# Patient Record
Sex: FEMALE | Race: WHITE | URBAN - METROPOLITAN AREA
[De-identification: names, ages, dates, MRNs, and addresses within clinical notes are randomized per-mention and may not be internally consistent; named-entity substitution may affect disease eponyms.]

---

## 2020-12-08 ENCOUNTER — OFFICE VISIT (OUTPATIENT)
Dept: OBGYN CLINIC | Age: 25
End: 2020-12-08
Payer: COMMERCIAL

## 2020-12-08 VITALS
WEIGHT: 213.6 LBS | BODY MASS INDEX: 34.33 KG/M2 | SYSTOLIC BLOOD PRESSURE: 120 MMHG | HEIGHT: 66 IN | DIASTOLIC BLOOD PRESSURE: 80 MMHG

## 2020-12-08 DIAGNOSIS — Z11.51 SCREENING FOR HPV (HUMAN PAPILLOMAVIRUS): ICD-10-CM

## 2020-12-08 DIAGNOSIS — Z01.419 VISIT FOR GYNECOLOGIC EXAMINATION: ICD-10-CM

## 2020-12-08 DIAGNOSIS — Z11.3 ROUTINE SCREENING FOR STI (SEXUALLY TRANSMITTED INFECTION): ICD-10-CM

## 2020-12-08 LAB
HCG, URINE, POC: NEGATIVE
Lab: NORMAL
NEGATIVE QC PASS/FAIL: NORMAL
POSITIVE QC PASS/FAIL: NORMAL

## 2020-12-08 PROCEDURE — G8484 FLU IMMUNIZE NO ADMIN: HCPCS | Performed by: OBSTETRICS & GYNECOLOGY

## 2020-12-08 PROCEDURE — 99395 PREV VISIT EST AGE 18-39: CPT | Performed by: OBSTETRICS & GYNECOLOGY

## 2020-12-08 PROCEDURE — G8427 DOCREV CUR MEDS BY ELIG CLIN: HCPCS | Performed by: OBSTETRICS & GYNECOLOGY

## 2020-12-08 PROCEDURE — 96372 THER/PROPH/DIAG INJ SC/IM: CPT | Performed by: OBSTETRICS & GYNECOLOGY

## 2020-12-08 PROCEDURE — 81025 URINE PREGNANCY TEST: CPT | Performed by: OBSTETRICS & GYNECOLOGY

## 2020-12-08 PROCEDURE — G8417 CALC BMI ABV UP PARAM F/U: HCPCS | Performed by: OBSTETRICS & GYNECOLOGY

## 2020-12-08 PROCEDURE — 1036F TOBACCO NON-USER: CPT | Performed by: OBSTETRICS & GYNECOLOGY

## 2020-12-08 RX ORDER — MEDROXYPROGESTERONE ACETATE 150 MG/ML
150 INJECTION, SUSPENSION INTRAMUSCULAR ONCE
Status: COMPLETED | OUTPATIENT
Start: 2020-12-08 | End: 2020-12-08

## 2020-12-08 RX ADMIN — MEDROXYPROGESTERONE ACETATE 150 MG: 150 INJECTION, SUSPENSION INTRAMUSCULAR at 15:08

## 2020-12-08 SDOH — HEALTH STABILITY: MENTAL HEALTH: HOW OFTEN DO YOU HAVE A DRINK CONTAINING ALCOHOL?: MONTHLY OR LESS

## 2020-12-08 ASSESSMENT — PATIENT HEALTH QUESTIONNAIRE - PHQ9
SUM OF ALL RESPONSES TO PHQ QUESTIONS 1-9: 0
SUM OF ALL RESPONSES TO PHQ QUESTIONS 1-9: 0
SUM OF ALL RESPONSES TO PHQ9 QUESTIONS 1 & 2: 0
2. FEELING DOWN, DEPRESSED OR HOPELESS: 0
1. LITTLE INTEREST OR PLEASURE IN DOING THINGS: 0
SUM OF ALL RESPONSES TO PHQ QUESTIONS 1-9: 0

## 2020-12-09 LAB
CLUE CELLS: NORMAL
TRICHOMONAS PREP: NORMAL
TRICHOMONAS VAGINALIS SCREEN: NEGATIVE
YEAST WET PREP: NORMAL

## 2020-12-09 ASSESSMENT — ENCOUNTER SYMPTOMS
BLOOD IN STOOL: 0
WHEEZING: 0
COUGH: 0
SHORTNESS OF BREATH: 0
ABDOMINAL PAIN: 0
DIARRHEA: 0
ABDOMINAL DISTENTION: 0
SORE THROAT: 0
CONSTIPATION: 0
NAUSEA: 0
VOMITING: 0

## 2020-12-09 NOTE — PROGRESS NOTES
Physically abused: Not on file     Forced sexual activity: Not on file   Other Topics Concern    Not on file   Social History Narrative    Not on file       GynecologicHistory  No LMP recorded. Patient has had an injection. Last Pap: 2019 Results: normal  Last Mammogram: Not Indicated Results: N/A  no fmhx cancer  OB History        1    Para   1    Term   1            AB        Living   1       SAB        TAB        Ectopic        Molar        Multiple        Live Births   1              Patient's medications, allergies, past medical, surgical, social and family histories were reviewed and updated as appropriate. Review of Systems  Review of Systems   Constitutional: Negative for activity change, appetite change, fatigue and unexpected weight change. HENT: Negative for nosebleeds and sore throat. Eyes: Negative for visual disturbance. Respiratory: Negative for cough, shortness of breath and wheezing. Cardiovascular: Negative for chest pain, palpitations and leg swelling. Gastrointestinal: Negative for abdominal distention, abdominal pain, blood in stool, constipation, diarrhea, nausea and vomiting. Endocrine: Negative for cold intolerance, heat intolerance, polydipsia and polyuria. Genitourinary: Negative for difficulty urinating, dyspareunia, dysuria, frequency, genital sores, hematuria, menstrual problem, pelvic pain, urgency, vaginal bleeding, vaginal discharge and vaginal pain. Musculoskeletal: Negative for arthralgias. Skin: Negative for rash. Neurological: Negative for dizziness, weakness, light-headedness and headaches. Hematological: Negative for adenopathy. Does not bruise/bleed easily. Psychiatric/Behavioral: Negative for agitation, confusion, dysphoric mood and sleep disturbance.          Objective:     Vitals:  /80   Ht 5' 6\" (1.676 m)   Wt 213 lb 9.6 oz (96.9 kg)   BMI 34.48 kg/m²     Physical Exam  Constitutional:       General: She is not in acute distress. Appearance: She is well-developed. She is not diaphoretic. HENT:      Head: Normocephalic. Eyes:      Conjunctiva/sclera: Conjunctivae normal.      Pupils: Pupils are equal, round, and reactive to light. Neck:      Thyroid: No thyromegaly. Trachea: No tracheal deviation. Cardiovascular:      Rate and Rhythm: Normal rate and regular rhythm. Heart sounds: Normal heart sounds. No murmur. No friction rub. No gallop. Pulmonary:      Effort: Pulmonary effort is normal. No respiratory distress. Breath sounds: Normal breath sounds. No wheezing or rales. Chest:      Chest wall: No tenderness. Breasts:         Right: No inverted nipple, mass, nipple discharge, skin change or tenderness. Left: No inverted nipple, mass, nipple discharge, skin change or tenderness. Abdominal:      General: Bowel sounds are normal. There is no distension. Palpations: Abdomen is soft. There is no mass. Tenderness: There is no abdominal tenderness. There is no guarding or rebound. Genitourinary:     Labia:         Right: No rash, tenderness or lesion. Left: No rash, tenderness or lesion. Vagina: Normal. No vaginal discharge, erythema, tenderness or bleeding. Cervix: No cervical motion tenderness, discharge or friability. Uterus: Not deviated, not enlarged, not fixed and not tender. Adnexa:         Right: No mass, tenderness or fullness. Left: No mass, tenderness or fullness. Comments: Uterus is not prolapsed and there is not a cystocele or rectocele noted  Musculoskeletal:      Right lower leg: No edema. Left lower leg: No edema. Lymphadenopathy:      Upper Body:      Right upper body: No supraclavicular or axillary adenopathy. Left upper body: No supraclavicular or axillary adenopathy. Skin:     General: Skin is warm and dry. Neurological:      Mental Status: She is alert and oriented to person, place, and time. DO

## 2020-12-11 LAB
C TRACH DNA GENITAL QL NAA+PROBE: NEGATIVE
N. GONORRHOEAE DNA: NEGATIVE

## 2021-03-08 ENCOUNTER — NURSE ONLY (OUTPATIENT)
Dept: OBGYN CLINIC | Age: 26
End: 2021-03-08
Payer: COMMERCIAL

## 2021-03-08 DIAGNOSIS — N94.6 MENORRHALGIA: Primary | ICD-10-CM

## 2021-03-08 LAB
HCG, URINE, POC: NEGATIVE
Lab: NORMAL
NEGATIVE QC PASS/FAIL: NORMAL
POSITIVE QC PASS/FAIL: NORMAL

## 2021-03-08 PROCEDURE — 81025 URINE PREGNANCY TEST: CPT | Performed by: ADVANCED PRACTICE MIDWIFE

## 2021-03-08 PROCEDURE — 96372 THER/PROPH/DIAG INJ SC/IM: CPT | Performed by: ADVANCED PRACTICE MIDWIFE

## 2021-03-08 RX ORDER — MEDROXYPROGESTERONE ACETATE 150 MG/ML
150 INJECTION, SUSPENSION INTRAMUSCULAR ONCE
Status: COMPLETED | OUTPATIENT
Start: 2021-03-08 | End: 2021-03-08

## 2021-03-08 RX ADMIN — MEDROXYPROGESTERONE ACETATE 150 MG: 150 INJECTION, SUSPENSION INTRAMUSCULAR at 14:20

## 2021-03-08 NOTE — PROGRESS NOTES
After obtaining consent, and per orders of Dr. Lakisha Mayo CNM, injection of depo given in Right deltoid by Birdie Osler. Patient instructed to remain in clinic for 20 minutes afterwards, and to report any adverse reaction to me immediately.

## 2021-03-15 ENCOUNTER — OFFICE VISIT (OUTPATIENT)
Dept: FAMILY MEDICINE CLINIC | Age: 26
End: 2021-03-15
Payer: COMMERCIAL

## 2021-03-15 VITALS
BODY MASS INDEX: 33.62 KG/M2 | DIASTOLIC BLOOD PRESSURE: 70 MMHG | TEMPERATURE: 98 F | HEART RATE: 90 BPM | OXYGEN SATURATION: 99 % | HEIGHT: 67 IN | SYSTOLIC BLOOD PRESSURE: 110 MMHG | WEIGHT: 214.2 LBS

## 2021-03-15 DIAGNOSIS — M54.50 LUMBAR PAIN: Primary | ICD-10-CM

## 2021-03-15 DIAGNOSIS — Z13.31 POSITIVE DEPRESSION SCREENING: ICD-10-CM

## 2021-03-15 DIAGNOSIS — E61.1 IRON DEFICIENCY: ICD-10-CM

## 2021-03-15 DIAGNOSIS — M79.671 RIGHT FOOT PAIN: ICD-10-CM

## 2021-03-15 DIAGNOSIS — H61.20 IMPACTED CERUMEN, UNSPECIFIED LATERALITY: ICD-10-CM

## 2021-03-15 LAB
BASOPHILS ABSOLUTE: 0 K/UL (ref 0–0.2)
BASOPHILS RELATIVE PERCENT: 0.4 %
EOSINOPHILS ABSOLUTE: 0.3 K/UL (ref 0–0.7)
EOSINOPHILS RELATIVE PERCENT: 2.9 %
HCT VFR BLD CALC: 42.9 % (ref 37–47)
HEMOGLOBIN: 14.4 G/DL (ref 12–16)
LYMPHOCYTES ABSOLUTE: 4.4 K/UL (ref 1–4.8)
LYMPHOCYTES RELATIVE PERCENT: 37.4 %
MCH RBC QN AUTO: 29.6 PG (ref 27–31.3)
MCHC RBC AUTO-ENTMCNC: 33.5 % (ref 33–37)
MCV RBC AUTO: 88.3 FL (ref 82–100)
MONOCYTES ABSOLUTE: 0.8 K/UL (ref 0.2–0.8)
MONOCYTES RELATIVE PERCENT: 6.6 %
NEUTROPHILS ABSOLUTE: 6.2 K/UL (ref 1.4–6.5)
NEUTROPHILS RELATIVE PERCENT: 52.7 %
PDW BLD-RTO: 12.7 % (ref 11.5–14.5)
PLATELET # BLD: 267 K/UL (ref 130–400)
RBC # BLD: 4.85 M/UL (ref 4.2–5.4)
WBC # BLD: 11.7 K/UL (ref 4.8–10.8)

## 2021-03-15 PROCEDURE — G8417 CALC BMI ABV UP PARAM F/U: HCPCS | Performed by: FAMILY MEDICINE

## 2021-03-15 PROCEDURE — G8484 FLU IMMUNIZE NO ADMIN: HCPCS | Performed by: FAMILY MEDICINE

## 2021-03-15 PROCEDURE — G8427 DOCREV CUR MEDS BY ELIG CLIN: HCPCS | Performed by: FAMILY MEDICINE

## 2021-03-15 PROCEDURE — G8431 POS CLIN DEPRES SCRN F/U DOC: HCPCS | Performed by: FAMILY MEDICINE

## 2021-03-15 PROCEDURE — 69210 REMOVE IMPACTED EAR WAX UNI: CPT | Performed by: FAMILY MEDICINE

## 2021-03-15 PROCEDURE — 1036F TOBACCO NON-USER: CPT | Performed by: FAMILY MEDICINE

## 2021-03-15 PROCEDURE — 99204 OFFICE O/P NEW MOD 45 MIN: CPT | Performed by: FAMILY MEDICINE

## 2021-03-15 RX ORDER — IBUPROFEN 400 MG/1
400 TABLET ORAL EVERY 6 HOURS PRN
Qty: 120 TABLET | Refills: 3 | Status: SHIPPED | OUTPATIENT
Start: 2021-03-15 | End: 2022-08-19 | Stop reason: SDUPTHER

## 2021-03-15 RX ORDER — MEDROXYPROGESTERONE ACETATE 150 MG/ML
150 INJECTION, SUSPENSION INTRAMUSCULAR
COMMUNITY
End: 2022-03-18 | Stop reason: ALTCHOICE

## 2021-03-15 RX ORDER — CYCLOBENZAPRINE HCL 5 MG
5 TABLET ORAL 2 TIMES DAILY PRN
Qty: 30 TABLET | Refills: 2 | Status: SHIPPED | OUTPATIENT
Start: 2021-03-15 | End: 2022-03-21 | Stop reason: SDUPTHER

## 2021-03-15 SDOH — ECONOMIC STABILITY: FOOD INSECURITY: WITHIN THE PAST 12 MONTHS, THE FOOD YOU BOUGHT JUST DIDN'T LAST AND YOU DIDN'T HAVE MONEY TO GET MORE.: NEVER TRUE

## 2021-03-15 SDOH — ECONOMIC STABILITY: FOOD INSECURITY: WITHIN THE PAST 12 MONTHS, YOU WORRIED THAT YOUR FOOD WOULD RUN OUT BEFORE YOU GOT MONEY TO BUY MORE.: NEVER TRUE

## 2021-03-15 SDOH — ECONOMIC STABILITY: TRANSPORTATION INSECURITY
IN THE PAST 12 MONTHS, HAS LACK OF TRANSPORTATION KEPT YOU FROM MEETINGS, WORK, OR FROM GETTING THINGS NEEDED FOR DAILY LIVING?: NO

## 2021-03-15 ASSESSMENT — COLUMBIA-SUICIDE SEVERITY RATING SCALE - C-SSRS
6. HAVE YOU EVER DONE ANYTHING, STARTED TO DO ANYTHING, OR PREPARED TO DO ANYTHING TO END YOUR LIFE?: NO
2. HAVE YOU ACTUALLY HAD ANY THOUGHTS OF KILLING YOURSELF?: NO

## 2021-03-15 ASSESSMENT — PATIENT HEALTH QUESTIONNAIRE - PHQ9
2. FEELING DOWN, DEPRESSED OR HOPELESS: 2
10. IF YOU CHECKED OFF ANY PROBLEMS, HOW DIFFICULT HAVE THESE PROBLEMS MADE IT FOR YOU TO DO YOUR WORK, TAKE CARE OF THINGS AT HOME, OR GET ALONG WITH OTHER PEOPLE: 2
9. THOUGHTS THAT YOU WOULD BE BETTER OFF DEAD, OR OF HURTING YOURSELF: 1
6. FEELING BAD ABOUT YOURSELF - OR THAT YOU ARE A FAILURE OR HAVE LET YOURSELF OR YOUR FAMILY DOWN: 3
SUM OF ALL RESPONSES TO PHQ QUESTIONS 1-9: 19
SUM OF ALL RESPONSES TO PHQ QUESTIONS 1-9: 19
5. POOR APPETITE OR OVEREATING: 2

## 2021-03-15 NOTE — PROGRESS NOTES
Chief Complaint   Patient presents with   [address]     Has not had a PCP in many years.  Ankle Pain     Has been having right ankle pain for the past month. Denies any injury. Denies any swelling. Has not used heat or ice to the area. Has used OTC pain cream with min. relief.  Back Pain     States she was told when she was pregnant that she has \"sciatica\". States when she made this appt. she was having a lot of pain. This has decreased now, but would like to discuss things she can do for this in the future.  Depression     Has struggled with depression for a long time. States she would like to find a counselor to talk with. Just moved to the area from [address] [place] [address] 153        HPI: Osbaldo Stovall 23 y.o. female presenting for     Depression   Was seeing a counselor in the past (years ago)  But has not seen anyone in 8 years   Would like to come back to counselor. Has not been diagnosed. Back pain   Was in the ED for pain in the leg  Told sciatica   Was given muscle relaxers but did not take it because she was pregnant   Pain is in the lower back with not much radiation. 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 Right ankle pain  Back of right ankle painful for the last couple worse with movement. Has tried topical pain medication to help with it. Does not wear insoles     Current Outpatient Medications   Medication Sig Dispense Refill    medroxyPROGESTERone (DEPO-PROVERA) 150 MG/ML injection Inject 150 mg into the muscle every 3 months      ibuprofen (ADVIL;MOTRIN) 400 MG tablet Take 1 tablet by mouth every 6 hours as needed for Pain 120 tablet 3    cyclobenzaprine (FLEXERIL) 5 MG tablet Take 1 tablet by mouth 2 times daily as needed for Muscle spasms 30 tablet 2     No current facility-administered medications for this visit. ROS  CONSTITUTIONAL: The patient denies fevers, chills, sweats and body ache.   HEENT: Denies headache, blurry vision, eye pain, tinnitus, vertigo,  sore throat, neck or thyroid masses. RESPIRATORY: Denies cough, sputum, hemoptysis. CARDIAC: Denies chest pain, pressure, palpitations, Denies lower extremity edema. GASTROINTESTINAL: Denies abdominal pain, constipation, diarrhea, bleeding in the stools,   GENITOURINARY: Denies dysuria, hematuria, nocturia or frequency, urinary incontinence. NEUROLOGIC: Denies headaches, dizziness, syncope, weakness  MUSCULOSKELETAL:  Admits to ankle and back pain. ENDOCRINOLOGY: Denies heat or cold intolerance. HEMATOLOGY: Denies easy bleeding or blood transfusion,anemia  DERMATOLOGY: Denies changes in moles or pigmentation changes. PSYCHIATRY: Denies depression, agitation or anxiety. Past Medical History:   Diagnosis Date    Class 1 obesity due to excess calories without serious comorbidity with body mass index (BMI) of 34.0 to 34.9 in adult     Current episode of major depressive disorder without prior episode         History reviewed. No pertinent surgical history.      Family History   Problem Relation Age of Onset    No Known Problems Mother     No Known Problems Father         Social History     Socioeconomic History    Marital status: Single     Spouse name: Not on file    Number of children: Not on file    Years of education: Not on file    Highest education level: Not on file   Occupational History    Not on file   Social Needs    Financial resource strain: Not very hard    Food insecurity     Worry: Never true     Inability: Never true    Transportation needs     Medical: No     Non-medical: No   Tobacco Use    Smoking status: Never Smoker    Smokeless tobacco: Never Used   Substance and Sexual Activity    Alcohol use: Yes     Frequency: Monthly or less    Drug use: Never    Sexual activity: Not Currently     Birth control/protection: Injection   Lifestyle    Physical activity     Days per week: Not on file     Minutes per session: Not on file    Stress: Not on file   Relationships    Social connections     Talks on phone: Not on file     Gets together: Not on file     Attends Scientology service: Not on file     Active member of club or organization: Not on file     Attends meetings of clubs or organizations: Not on file     Relationship status: Not on file    Intimate partner violence     Fear of current or ex partner: Not on file     Emotionally abused: Not on file     Physically abused: Not on file     Forced sexual activity: Not on file   Other Topics Concern    Not on file   Social History Narrative    Has 1 child 2017 Girl- Purnima     [place] moved to [facility] stayed there for 8 years     Hobbies: sleep, likes to walk around         /70   Pulse 90   Temp 98 °F (36.7 °C)   Ht 5' 6.5\" (1.689 m)   Wt 214 lb 3.2 oz (97.2 kg)   SpO2 99%   BMI 34.05 kg/m²        Physical Exam:    General appearance - alert, well appearing, and in no distress  Mental Status - alert, oriented to person, place, and time  Eyes - pupils equal and reactive, extraocular eye movements intact   Ears - bilateral TM's and external ear canals normal Cerumen impaction noted.    Nose - normal and patent, no erythema, discharge or polyps   Sinuses - Normal paranasal sinuses without tenderness   Throat - mucous membranes moist, pharynx normal without lesions   Neck - supple, no significant adenopathy   Thyroid - thyroid is normal in size without nodules or tenderness    Chest - clear to auscultation, no wheezes, rales or rhonchi, symmetric air entry   Heart - normal rate, regular rhythm, normal S1, S2, no murmurs, rubs, clicks or gallops  Abdomen - soft, nontender, nondistended, no masses or organomegaly   Back exam - full range of motion, no tenderness, palpable spasm or pain on motion   Neurological - alert, oriented, normal speech, no focal findings or movement disorder noted   Musculoskeletal - tenderness to palpation along the achilles tendendon  Extremities - peripheral pulses normal, no pedal edema, no clubbing or cyanosis   Skin - normal coloration and turgor, no rashes, no suspicious skin lesions noted      Labs   No results found for: TSHREFLEX  No results found for: TSH    No results found for: NA, K, CL, CO2, BUN, CREATININE, GLUCOSE, CALCIUM, PROT, LABALBU, BILITOT, ALKPHOS, AST, ALT, LABGLOM, GFRAA, AGRATIO, GLOB      Lab Results   Component Value Date    WBC 11.7 (H) 03/15/2021    HGB 14.4 03/15/2021    HCT 42.9 03/15/2021    MCV 88.3 03/15/2021     03/15/2021     No results found for: LABA1C  No results found for: EAG      A/P: Osbaldo Márquezin 23 y.o. female presenting for     1. Lumbar pain    - XR LUMBAR SPINE (MIN 4 VIEWS); Future  - ibuprofen (ADVIL;MOTRIN) 400 MG tablet; Take 1 tablet by mouth every 6 hours as needed for Pain  Dispense: 120 tablet; Refill: 3    2. Positive depression screening    - Positive Screen for Clinical Depression with a Documented Follow-up Plan Yeny Catalan, PhD, Psychology, [address] [place] [address]    3. Iron deficiency    - CBC With Auto Differential; Future    4. Right foot pain    - XR FOOT RIGHT (MIN 3 VIEWS); Future  - ibuprofen (ADVIL;MOTRIN) 400 MG tablet; Take 1 tablet by mouth every 6 hours as needed for Pain  Dispense: 120 tablet; Refill: 3    5. Impacted cerumen, unspecified laterality    - 21214 - CT REMOVE IMPACTED EAR WAX          Please note, this report has been partially produced using speech recognition software  and may cause  and /or contain errors related to that system including grammar, punctuation and spelling as well as words and phrases that may seem inappropriate. If there are questions or concerns please feel free to contact me to clarify.

## 2021-03-19 DIAGNOSIS — M54.40 CHRONIC LOW BACK PAIN WITH SCIATICA, SCIATICA LATERALITY UNSPECIFIED, UNSPECIFIED BACK PAIN LATERALITY: Primary | ICD-10-CM

## 2021-03-19 DIAGNOSIS — G89.29 CHRONIC LOW BACK PAIN WITH SCIATICA, SCIATICA LATERALITY UNSPECIFIED, UNSPECIFIED BACK PAIN LATERALITY: Primary | ICD-10-CM

## 2021-03-19 RX ORDER — GABAPENTIN 300 MG/1
300 CAPSULE ORAL 2 TIMES DAILY
Qty: 60 CAPSULE | Refills: 0 | Status: SHIPPED | OUTPATIENT
Start: 2021-03-19 | End: 2022-03-18 | Stop reason: ALTCHOICE

## 2021-12-27 ENCOUNTER — NURSE ONLY (OUTPATIENT)
Dept: FAMILY MEDICINE CLINIC | Age: 26
End: 2021-12-27
Payer: COMMERCIAL

## 2021-12-27 DIAGNOSIS — U07.1 SARS-COV-2 POSITIVE: Primary | ICD-10-CM

## 2021-12-27 LAB
Lab: ABNORMAL
PERFORMING INSTRUMENT: ABNORMAL
QC PASS/FAIL: ABNORMAL
SARS-COV-2, POC: DETECTED

## 2021-12-27 PROCEDURE — 87426 SARSCOV CORONAVIRUS AG IA: CPT | Performed by: NURSE PRACTITIONER

## 2022-03-18 ENCOUNTER — TELEPHONE (OUTPATIENT)
Dept: OBGYN CLINIC | Age: 27
End: 2022-03-18

## 2022-03-18 ENCOUNTER — OFFICE VISIT (OUTPATIENT)
Dept: OBGYN CLINIC | Age: 27
End: 2022-03-18
Payer: COMMERCIAL

## 2022-03-18 VITALS
DIASTOLIC BLOOD PRESSURE: 60 MMHG | SYSTOLIC BLOOD PRESSURE: 100 MMHG | WEIGHT: 228 LBS | BODY MASS INDEX: 36.64 KG/M2 | HEIGHT: 66 IN

## 2022-03-18 DIAGNOSIS — Z11.3 ROUTINE SCREENING FOR STI (SEXUALLY TRANSMITTED INFECTION): ICD-10-CM

## 2022-03-18 DIAGNOSIS — Z01.419 ENCOUNTER FOR ANNUAL ROUTINE GYNECOLOGICAL EXAMINATION: Primary | ICD-10-CM

## 2022-03-18 DIAGNOSIS — Z01.419 ENCOUNTER FOR ANNUAL ROUTINE GYNECOLOGICAL EXAMINATION: ICD-10-CM

## 2022-03-18 PROCEDURE — G8417 CALC BMI ABV UP PARAM F/U: HCPCS | Performed by: OBSTETRICS & GYNECOLOGY

## 2022-03-18 PROCEDURE — 99395 PREV VISIT EST AGE 18-39: CPT | Performed by: OBSTETRICS & GYNECOLOGY

## 2022-03-18 PROCEDURE — G8484 FLU IMMUNIZE NO ADMIN: HCPCS | Performed by: OBSTETRICS & GYNECOLOGY

## 2022-03-18 PROCEDURE — 1036F TOBACCO NON-USER: CPT | Performed by: OBSTETRICS & GYNECOLOGY

## 2022-03-18 PROCEDURE — G8427 DOCREV CUR MEDS BY ELIG CLIN: HCPCS | Performed by: OBSTETRICS & GYNECOLOGY

## 2022-03-18 RX ORDER — NORETHINDRONE ACETATE AND ETHINYL ESTRADIOL 1MG-20(21)
1 KIT ORAL DAILY
Qty: 1 PACKET | Refills: 12 | Status: SHIPPED | OUTPATIENT
Start: 2022-03-18 | End: 2022-04-17 | Stop reason: SDUPTHER

## 2022-03-18 NOTE — TELEPHONE ENCOUNTER
Rx request   Requested Prescriptions     Pending Prescriptions Disp Refills    cyclobenzaprine (FLEXERIL) 5 MG tablet [Pharmacy Med Name: CYCLOBENZAPRINE 5 MG TABLET] 30 tablet 2     Sig: take 1 tablet by mouth twice a day if needed for muscle spasm     LOV 3/15/2021  Next Visit Date:  No future appointments.

## 2022-03-18 NOTE — PROGRESS NOTES
Subjective: Cassy Hudson is a 27 y.o. female  here for routine exam.  Current Complaints: normal menses, no abnormal bleeding, pelvic pain or discharge, no breast pain or new or enlarging lumps on self exam.    Menstrual history:   regular menses 28 days  Sexual activity:  yes, denies knowledge of risky exposure  Abnormalvaginal discharge:  No  Contraceptive method:  none and Depo-Provera injections    Vitals:  /60   Ht 5' 6\" (1.676 m)   Wt 228 lb (103.4 kg)   BMI 36.80 kg/m²   Allergies:Sulfamethoxazole-trimethoprim  Past Medical History:   Diagnosis Date    Class 1 obesity due to excess calories without serious comorbidity with body mass index (BMI) of 34.0 to 34.9 in adult     Current episode of major depressive disorder without prior episode      No past surgical history on file. Family History   Problem Relation Age of Onset    No Known Problems Mother     No Known Problems Father      Social History     Socioeconomic History    Marital status: Single     Spouse name: Not on file    Number of children: Not on file    Years of education: Not on file    Highest education level: Not on file   Occupational History    Not on file   Tobacco Use    Smoking status: Never Smoker    Smokeless tobacco: Never Used   Vaping Use    Vaping Use: Never used   Substance and Sexual Activity    Alcohol use:  Yes    Drug use: Never    Sexual activity: Not Currently     Birth control/protection: Injection   Other Topics Concern    Not on file   Social History Narrative    Has 1 child 2017 Girl- Purnima     Born in [place] moved to [facility] stayed there for 8 years     Hobbies: sleep, likes to walk around      Social Determinants of Health     Financial Resource Strain:     Difficulty of Paying Living Expenses: Not on file   Food Insecurity:     Worried About Running Out of Food in the Last Year: Not on file    Robinson of Food in the Last Year: Not on file   Transportation Needs:     Lack of Transportation (Medical): Not on file    Lack of Transportation (Non-Medical): Not on file   Physical Activity:     Days of Exercise per Week: Not on file    Minutes of Exercise per Session: Not on file   Stress:     Feeling of Stress : Not on file   Social Connections:     Frequency of Communication with Friends and Family: Not on file    Frequency of Social Gatherings with Friends and Family: Not on file    Attends Adventism Services: Not on file    Active Member of [address] [place] [address] [employer] or Organizations: Not on file    Attends Club or Organization Meetings: Not on file    Marital Status: Not on file   Intimate Partner Violence:     Fear of Current or Ex-Partner: Not on file    Emotionally Abused: Not on file    Physically Abused: Not on file    Sexually Abused: Not on file   Housing Stability:     Unable to Pay for Housing in the Last Year: Not on file    Number of [place] in the Last Year: Not on file    Unstable Housing in the Last Year: Not on file       GynecologicHistory  No LMP recorded. Patient has had an injection. Last Pap:  Results: normal  Last Mammogram: Not Indicated Results: N/A  no fmhx cancer  OB History        1    Para   1    Term   1            AB        Living   1       SAB        IAB        Ectopic        Molar        Multiple        Live Births   1              Patient's medications, allergies, past medical, surgical, social and family histories were reviewed and updated as appropriate. Review of Systems  Review of Systems   Constitutional: Negative for activity change, appetite change, fatigue and unexpected weight change. HENT: Negative for nosebleeds and sore throat. Eyes: Negative for visual disturbance. Respiratory: Negative for cough, shortness of breath and wheezing. Cardiovascular: Negative for chest pain, palpitations and leg swelling.    Gastrointestinal: Negative for abdominal distention, abdominal pain, blood in stool, constipation, diarrhea, nausea and vomiting. Endocrine: Negative for cold intolerance, heat intolerance, polydipsia and polyuria. Genitourinary: Negative for difficulty urinating, dyspareunia, dysuria, frequency, genital sores, hematuria, menstrual problem, pelvic pain, urgency, vaginal bleeding, vaginal discharge and vaginal pain. Musculoskeletal: Negative for arthralgias. Skin: Negative for rash. Neurological: Negative for dizziness, weakness, light-headedness and headaches. Hematological: Negative for adenopathy. Does not bruise/bleed easily. Psychiatric/Behavioral: Negative for confusion and sleep disturbance. Objective:     Vitals:  /60   Ht 5' 6\" (1.676 m)   Wt 228 lb (103.4 kg)   BMI 36.80 kg/m²     Physical Exam  Constitutional:       General: She is not in acute distress. Appearance: She is well-developed. She is not diaphoretic. HENT:      Head: Normocephalic. Eyes:      Conjunctiva/sclera: Conjunctivae normal.      Pupils: Pupils are equal, round, and reactive to light. Neck:      Thyroid: No thyromegaly. Trachea: No tracheal deviation. Cardiovascular:      Rate and Rhythm: Normal rate and regular rhythm. Heart sounds: Normal heart sounds. No murmur heard. No friction rub. No gallop. Pulmonary:      Effort: Pulmonary effort is normal. No respiratory distress. Breath sounds: Normal breath sounds. No wheezing or rales. Chest:      Chest wall: No tenderness. Abdominal:      General: Bowel sounds are normal. There is no distension. Palpations: Abdomen is soft. There is no mass. Tenderness: There is no abdominal tenderness. There is no guarding or rebound. Genitourinary:     Labia:         Right: No rash, tenderness or lesion. Left: No rash, tenderness or lesion. Vagina: Normal. No vaginal discharge, erythema, tenderness or bleeding. Cervix: No cervical motion tenderness, discharge or friability.       Uterus: Not deviated, not enlarged, not fixed and not tender. Adnexa:         Right: No mass, tenderness or fullness. Left: No mass, tenderness or fullness. Comments: Uterus is not prolapsed and there is not a cystocele or rectocele noted  Skin:     General: Skin is warm and dry. Neurological:      Mental Status: She is alert and oriented to person, place, and time. Cranial Nerves: No cranial nerve deficit. Deep Tendon Reflexes: Reflexes normal.   Psychiatric:         Behavior: Behavior normal.         Judgment: Judgment normal.         Assessment:      Diagnosis Orders   1. Encounter for annual routine gynecological examination  Wet prep, genital    C.trachomatis N.gonorrhoeae DNA    PAP SMEAR   2. Routine screening for STI (sexually transmitted infection)  Wet prep, genital    C.trachomatis N.gonorrhoeae DNA    PAP SMEAR       Body mass index is 36.8 kg/m². Obesity:  Overweight        Plan:   Pap smear : indicated:  performed. Breast exam :Normal  STD work up : As appropriate    Obesity Counseling:  Given  Smoking Counseling:  N/A  STD counseling: Will call pt with results    Orders Placed This Encounter   Procedures    Wet prep, genital     Standing Status:   Future     Number of Occurrences:   1     Standing Expiration Date:   3/12/2023    C.trachomatis N.gonorrhoeae DNA     Standing Status:   Future     Number of Occurrences:   1     Standing Expiration Date:   3/12/2023    PAP SMEAR     Standing Status:   Future     Number of Occurrences:   1     Standing Expiration Date:   3/18/2023     Order Specific Question:   Collection Type     Answer: Thin Prep     Order Specific Question:   Prior Abnormal Pap Test     Answer:   No     Order Specific Question:   Screening or Diagnostic     Answer:   Screening     Order Specific Question:   HPV Requested?      Answer:   Yes -  If ASCUS Reflex HPV     Order Specific Question:   High Risk Patient     Answer:   N/A     Orders Placed This Encounter

## 2022-03-18 NOTE — TELEPHONE ENCOUNTER
Pt forgot to mention at appointment that she wants to be put on a birth control pill, please send prescription to [facility] [address] in [place]

## 2022-03-19 ASSESSMENT — ENCOUNTER SYMPTOMS
WHEEZING: 0
ABDOMINAL PAIN: 0
CONSTIPATION: 0
SHORTNESS OF BREATH: 0
ABDOMINAL DISTENTION: 0
BLOOD IN STOOL: 0
COUGH: 0
SORE THROAT: 0
VOMITING: 0
DIARRHEA: 0
NAUSEA: 0

## 2022-03-21 RX ORDER — CYCLOBENZAPRINE HCL 5 MG
TABLET ORAL
Qty: 30 TABLET | Refills: 2 | OUTPATIENT
Start: 2022-03-21

## 2022-03-21 RX ORDER — CYCLOBENZAPRINE HCL 5 MG
5 TABLET ORAL 2 TIMES DAILY PRN
Qty: 30 TABLET | Refills: 1 | Status: SHIPPED | OUTPATIENT
Start: 2022-03-21 | End: 2022-04-05

## 2022-03-24 LAB
C TRACH DNA GENITAL QL NAA+PROBE: POSITIVE
N. GONORRHOEAE DNA: NEGATIVE

## 2022-03-25 DIAGNOSIS — A74.9 CHLAMYDIA: Primary | ICD-10-CM

## 2022-03-25 RX ORDER — AZITHROMYCIN 250 MG/1
250 TABLET, FILM COATED ORAL SEE ADMIN INSTRUCTIONS
Qty: 6 TABLET | Refills: 0 | Status: SHIPPED | OUTPATIENT
Start: 2022-03-25 | End: 2022-03-30

## 2022-04-19 RX ORDER — NORETHINDRONE ACETATE AND ETHINYL ESTRADIOL 1MG-20(21)
1 KIT ORAL DAILY
Qty: 1 PACKET | Refills: 10 | Status: SHIPPED | OUTPATIENT
Start: 2022-04-19 | End: 2022-08-19

## 2022-05-13 ENCOUNTER — OFFICE VISIT (OUTPATIENT)
Dept: OBGYN CLINIC | Age: 27
End: 2022-05-13
Payer: COMMERCIAL

## 2022-05-13 VITALS
HEIGHT: 66 IN | SYSTOLIC BLOOD PRESSURE: 120 MMHG | WEIGHT: 228 LBS | DIASTOLIC BLOOD PRESSURE: 72 MMHG | BODY MASS INDEX: 36.64 KG/M2

## 2022-05-13 DIAGNOSIS — A74.9 CHLAMYDIA: Primary | ICD-10-CM

## 2022-05-13 DIAGNOSIS — A74.9 CHLAMYDIA: ICD-10-CM

## 2022-05-13 PROCEDURE — 1036F TOBACCO NON-USER: CPT | Performed by: OBSTETRICS & GYNECOLOGY

## 2022-05-13 PROCEDURE — G8427 DOCREV CUR MEDS BY ELIG CLIN: HCPCS | Performed by: OBSTETRICS & GYNECOLOGY

## 2022-05-13 PROCEDURE — 99213 OFFICE O/P EST LOW 20 MIN: CPT | Performed by: OBSTETRICS & GYNECOLOGY

## 2022-05-13 PROCEDURE — G8417 CALC BMI ABV UP PARAM F/U: HCPCS | Performed by: OBSTETRICS & GYNECOLOGY

## 2022-05-15 ASSESSMENT — ENCOUNTER SYMPTOMS
APNEA: 0
ABDOMINAL PAIN: 0
SHORTNESS OF BREATH: 0

## 2022-05-15 NOTE — PROGRESS NOTES
Subjective:      Patient ID:  Jose Barton is a 27 y.o. female with chief complaint of:  Chief Complaint   Patient presents with    Follow-up     MARIANA       Patient is status post a recent chlamydia positive test she is status post medication which she tolerated well      Past Medical History:   Diagnosis Date    Class 1 obesity due to excess calories without serious comorbidity with body mass index (BMI) of 34.0 to 34.9 in adult     Current episode of major depressive disorder without prior episode      No past surgical history on file. Family History   Problem Relation Age of Onset    No Known Problems Mother     No Known Problems Father      Current Outpatient Medications on File Prior to Visit   Medication Sig Dispense Refill    norethindrone-ethinyl estradiol ([address] [place] [address] FE 1/20) 1-20 MG-MCG per tablet Take 1 tablet by mouth daily for 28 days 1 packet 10    ibuprofen (ADVIL;MOTRIN) 400 MG tablet Take 1 tablet by mouth every 6 hours as needed for Pain 120 tablet 3     No current facility-administered medications on file prior to visit. Allergies:  Sulfamethoxazole-trimethoprim    Review of Systems   Constitutional: Negative for fatigue and fever. Respiratory: Negative for apnea and shortness of breath. Cardiovascular: Negative for chest pain and palpitations. Gastrointestinal: Negative for abdominal pain. Genitourinary: Negative for difficulty urinating, dysuria, pelvic pain, vaginal bleeding and vaginal discharge. Neurological: Negative for dizziness, weakness and light-headedness. Psychiatric/Behavioral: Negative for agitation and dysphoric mood. Objective:   /72   Ht 5' 6\" (1.676 m)   Wt 228 lb (103.4 kg)   BMI 36.80 kg/m²      Physical Exam  Constitutional:       Appearance: She is well-developed. Eyes:      Pupils: Pupils are equal, round, and reactive to light. Cardiovascular:      Rate and Rhythm: Normal rate and regular rhythm.       Heart sounds: Normal heart sounds. Pulmonary:      Effort: Pulmonary effort is normal.   Abdominal:      General: Bowel sounds are normal.      Palpations: Abdomen is soft. Genitourinary:     Vagina: No vaginal discharge, erythema, tenderness or bleeding. Cervix: No discharge, friability or erythema. Uterus: Not tender. Neurological:      Mental Status: She is alert and oriented to person, place, and time. Assessment:       Diagnosis Orders   1. Chlamydia  C.trachomatis N.gonorrhoeae DNA         Plan:      Orders Placed This Encounter   Procedures    C.trachomatis N.gonorrhoeae DNA     Standing Status:   Future     Number of Occurrences:   1     Standing Expiration Date:   5/13/2023     No orders of the defined types were placed in this encounter. Return if symptoms worsen or fail to improve.      Ermias Sharp, DO

## 2022-05-18 LAB
C TRACH DNA GENITAL QL NAA+PROBE: NEGATIVE
N. GONORRHOEAE DNA: NEGATIVE

## 2022-08-19 ENCOUNTER — OFFICE VISIT (OUTPATIENT)
Dept: FAMILY MEDICINE CLINIC | Age: 27
End: 2022-08-19
Payer: COMMERCIAL

## 2022-08-19 VITALS
BODY MASS INDEX: 35.36 KG/M2 | SYSTOLIC BLOOD PRESSURE: 118 MMHG | HEIGHT: 66 IN | HEART RATE: 88 BPM | OXYGEN SATURATION: 97 % | DIASTOLIC BLOOD PRESSURE: 70 MMHG | TEMPERATURE: 96.7 F | WEIGHT: 220 LBS

## 2022-08-19 DIAGNOSIS — Z11.4 ENCOUNTER FOR SCREENING FOR HIV: ICD-10-CM

## 2022-08-19 DIAGNOSIS — Z11.59 NEED FOR HEPATITIS C SCREENING TEST: ICD-10-CM

## 2022-08-19 DIAGNOSIS — F32.A DEPRESSION, UNSPECIFIED DEPRESSION TYPE: ICD-10-CM

## 2022-08-19 DIAGNOSIS — F32.A DEPRESSION, UNSPECIFIED DEPRESSION TYPE: Primary | ICD-10-CM

## 2022-08-19 DIAGNOSIS — M79.671 RIGHT FOOT PAIN: ICD-10-CM

## 2022-08-19 DIAGNOSIS — L30.9 DERMATITIS: ICD-10-CM

## 2022-08-19 DIAGNOSIS — M54.50 LUMBAR PAIN: ICD-10-CM

## 2022-08-19 LAB
ALBUMIN SERPL-MCNC: 4.4 G/DL (ref 3.5–4.6)
ALP BLD-CCNC: 69 U/L (ref 40–130)
ALT SERPL-CCNC: 19 U/L (ref 0–33)
ANION GAP SERPL CALCULATED.3IONS-SCNC: 12 MEQ/L (ref 9–15)
AST SERPL-CCNC: 14 U/L (ref 0–35)
BILIRUB SERPL-MCNC: 0.3 MG/DL (ref 0.2–0.7)
BUN BLDV-MCNC: 8 MG/DL (ref 6–20)
CALCIUM SERPL-MCNC: 9.1 MG/DL (ref 8.5–9.9)
CHLORIDE BLD-SCNC: 107 MEQ/L (ref 95–107)
CO2: 21 MEQ/L (ref 20–31)
CREAT SERPL-MCNC: 0.54 MG/DL (ref 0.5–0.9)
GFR AFRICAN AMERICAN: >60
GFR NON-AFRICAN AMERICAN: >60
GLOBULIN: 2.3 G/DL (ref 2.3–3.5)
GLUCOSE BLD-MCNC: 80 MG/DL (ref 70–99)
POTASSIUM SERPL-SCNC: 4.1 MEQ/L (ref 3.4–4.9)
SODIUM BLD-SCNC: 140 MEQ/L (ref 135–144)
TOTAL PROTEIN: 6.7 G/DL (ref 6.3–8)

## 2022-08-19 PROCEDURE — 99213 OFFICE O/P EST LOW 20 MIN: CPT | Performed by: FAMILY MEDICINE

## 2022-08-19 PROCEDURE — G8417 CALC BMI ABV UP PARAM F/U: HCPCS | Performed by: FAMILY MEDICINE

## 2022-08-19 PROCEDURE — G8427 DOCREV CUR MEDS BY ELIG CLIN: HCPCS | Performed by: FAMILY MEDICINE

## 2022-08-19 PROCEDURE — 1036F TOBACCO NON-USER: CPT | Performed by: FAMILY MEDICINE

## 2022-08-19 RX ORDER — IBUPROFEN 400 MG/1
400 TABLET ORAL EVERY 6 HOURS PRN
Qty: 120 TABLET | Refills: 3 | Status: SHIPPED | OUTPATIENT
Start: 2022-08-19

## 2022-08-19 SDOH — ECONOMIC STABILITY: FOOD INSECURITY: WITHIN THE PAST 12 MONTHS, YOU WORRIED THAT YOUR FOOD WOULD RUN OUT BEFORE YOU GOT MONEY TO BUY MORE.: NEVER TRUE

## 2022-08-19 SDOH — ECONOMIC STABILITY: TRANSPORTATION INSECURITY
IN THE PAST 12 MONTHS, HAS THE LACK OF TRANSPORTATION KEPT YOU FROM MEDICAL APPOINTMENTS OR FROM GETTING MEDICATIONS?: NO

## 2022-08-19 SDOH — ECONOMIC STABILITY: FOOD INSECURITY: WITHIN THE PAST 12 MONTHS, THE FOOD YOU BOUGHT JUST DIDN'T LAST AND YOU DIDN'T HAVE MONEY TO GET MORE.: NEVER TRUE

## 2022-08-19 ASSESSMENT — PATIENT HEALTH QUESTIONNAIRE - PHQ9
1. LITTLE INTEREST OR PLEASURE IN DOING THINGS: 0
10. IF YOU CHECKED OFF ANY PROBLEMS, HOW DIFFICULT HAVE THESE PROBLEMS MADE IT FOR YOU TO DO YOUR WORK, TAKE CARE OF THINGS AT HOME, OR GET ALONG WITH OTHER PEOPLE: 0
4. FEELING TIRED OR HAVING LITTLE ENERGY: 0
SUM OF ALL RESPONSES TO PHQ QUESTIONS 1-9: 0
SUM OF ALL RESPONSES TO PHQ9 QUESTIONS 1 & 2: 0
SUM OF ALL RESPONSES TO PHQ QUESTIONS 1-9: 0
6. FEELING BAD ABOUT YOURSELF - OR THAT YOU ARE A FAILURE OR HAVE LET YOURSELF OR YOUR FAMILY DOWN: 0
3. TROUBLE FALLING OR STAYING ASLEEP: 0
8. MOVING OR SPEAKING SO SLOWLY THAT OTHER PEOPLE COULD HAVE NOTICED. OR THE OPPOSITE, BEING SO FIGETY OR RESTLESS THAT YOU HAVE BEEN MOVING AROUND A LOT MORE THAN USUAL: 0
2. FEELING DOWN, DEPRESSED OR HOPELESS: 0
5. POOR APPETITE OR OVEREATING: 0
7. TROUBLE CONCENTRATING ON THINGS, SUCH AS READING THE NEWSPAPER OR WATCHING TELEVISION: 0
9. THOUGHTS THAT YOU WOULD BE BETTER OFF DEAD, OR OF HURTING YOURSELF: 0

## 2022-08-19 ASSESSMENT — SOCIAL DETERMINANTS OF HEALTH (SDOH): HOW HARD IS IT FOR YOU TO PAY FOR THE VERY BASICS LIKE FOOD, HOUSING, MEDICAL CARE, AND HEATING?: NOT HARD AT ALL

## 2022-08-19 NOTE — PROGRESS NOTES
Chief Complaint   Patient presents with    Follow-up    Skin Problem     Skin on hands are really dry and they won't heal since the winter        HPI: Odelia Lesches 27 y.o. female presenting for       Dermtaittis   On the hands   Started from the winter   Has tried topical lotions with mild relief of symptoms       Depression   Was seeing a counselor in the past (years ago)  But has not seen anyone in 8 years   Would like to come back to counselor. Has not been diagnosed. Back pain   Was in the ED for pain in the leg  Told sciatica   Was given muscle relaxers but did not take it because she was pregnant   Pain is in the lower back with not much radiation. 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 Right ankle pain  Back of right ankle painful for the last couple worse with movement. Has tried topical pain medication to help with it. Does not wear insoles     Current Outpatient Medications   Medication Sig Dispense Refill    norethindrone-ethinyl estradiol (MICROGESTIN FE 1/20) 1-20 MG-MCG per tablet Take 1 tablet by mouth daily for 28 days 1 packet 10    ibuprofen (ADVIL;MOTRIN) 400 MG tablet Take 1 tablet by mouth every 6 hours as needed for Pain 120 tablet 3     No current facility-administered medications for this visit. ROS  CONSTITUTIONAL: The patient denies fevers, chills, sweats and body ache. HEENT: Denies headache, blurry vision, eye pain, tinnitus, vertigo,  sore throat, neck or thyroid masses. RESPIRATORY: Denies cough, sputum, hemoptysis. CARDIAC: Denies chest pain, pressure, palpitations, Denies lower extremity edema. GASTROINTESTINAL: Denies abdominal pain, constipation, diarrhea, bleeding in the stools,   GENITOURINARY: Denies dysuria, hematuria, nocturia or frequency, urinary incontinence. NEUROLOGIC: Denies headaches, dizziness, syncope, weakness  MUSCULOSKELETAL:  Admits to ankle and back pain.    ENDOCRINOLOGY: Denies heat or cold intolerance. HEMATOLOGY: Denies easy bleeding or blood transfusion,anemia  DERMATOLOGY: Has a rash on her hands. PSYCHIATRY: Denies depression, agitation or anxiety. Past Medical History:   Diagnosis Date    Class 1 obesity due to excess calories without serious comorbidity with body mass index (BMI) of 34.0 to 34.9 in adult     Current episode of major depressive disorder without prior episode         No past surgical history on file. Family History   Problem Relation Age of Onset    No Known Problems Mother     No Known Problems Father         Social History     Socioeconomic History    Marital status: Single     Spouse name: Not on file    Number of children: Not on file    Years of education: Not on file    Highest education level: Not on file   Occupational History    Not on file   Tobacco Use    Smoking status: Never    Smokeless tobacco: Never   Vaping Use    Vaping Use: Never used   Substance and Sexual Activity    Alcohol use: Yes    Drug use: Never    Sexual activity: Not Currently     Birth control/protection: Injection   Other Topics Concern    Not on file   Social History Narrative    Has 1 child 2017 Liya- Purnima     [place] moved to [facility] stayed there for 8 years     Hobbies: sleep, likes to walk around      Social Determinants of Health     Financial Resource Strain: Low Risk     Difficulty of Paying Living Expenses: Not hard at all   Food Insecurity: No Food Insecurity    Worried About Running Out of Food in the Last Year: Never true    [address] in the Last Year: Never true   Transportation Needs: No Transportation Needs    Lack of Transportation (Medical): No    Lack of Transportation (Non-Medical):  No   Physical Activity: Not on file   Stress: Not on file   Social Connections: Not on file   Intimate Partner Violence: Not on file   Housing Stability: Not on file        /70   Pulse 88   Temp (!) 96.7 °F (35.9 °C) (Temporal)   Ht 5' 6\" (1.676 m) Wt 220 lb (99.8 kg)   SpO2 97%   BMI 35.51 kg/m²        Physical Exam:    General appearance - alert, well appearing, and in no distress  Mental Status - alert, oriented to person, place, and time  Eyes - pupils equal and reactive, extraocular eye movements intact   Ears - bilateral TM's and external ear canals normal Cerumen impaction noted. Nose - normal and patent, no erythema, discharge or polyps   Sinuses - Normal paranasal sinuses without tenderness   Throat - mucous membranes moist, pharynx normal without lesions   Neck - supple, no significant adenopathy   Thyroid - thyroid is normal in size without nodules or tenderness    Chest - clear to auscultation, no wheezes, rales or rhonchi, symmetric air entry   Heart - normal rate, regular rhythm, normal S1, S2, no murmurs, rubs, clicks or gallops  Abdomen - soft, nontender, nondistended, no masses or organomegaly   Back exam - full range of motion, no tenderness, palpable spasm or pain on motion   Neurological - alert, oriented, normal speech, no focal findings or movement disorder noted   Musculoskeletal - tenderness to palpation along the achilles tendendon  Extremities -scaly rough rash on her hands. No signs of discharge. Skin - normal coloration and turgor, no rashes, no suspicious skin lesions noted      Labs   No results found for: TSHREFLEX  No results found for: TSH    No results found for: NA, K, CL, CO2, BUN, CREATININE, GLUCOSE, CALCIUM, PROT, LABALBU, BILITOT, ALKPHOS, AST, ALT, LABGLOM, GFRAA, AGRATIO, GLOB      Lab Results   Component Value Date    WBC 11.7 (H) 03/15/2021    HGB 14.4 03/15/2021    HCT 42.9 03/15/2021    MCV 88.3 03/15/2021     03/15/2021     No results found for: LABA1C  No results found for: EAG      A/P: Temo Saab 27 y.o. female presenting for     1. Lumbar pain    - ibuprofen (ADVIL;MOTRIN) 400 MG tablet; Take 1 tablet by mouth every 6 hours as needed for Pain  Dispense: 120 tablet; Refill: 3    2.  Right foot pain    - ibuprofen (ADVIL;MOTRIN) 400 MG tablet; Take 1 tablet by mouth every 6 hours as needed for Pain  Dispense: 120 tablet; Refill: 3    3. Depression, unspecified depression type  Would like to see counseling. Denies any SI or HI   - Brant Davison, PhD, Psychology, [address] [place] [address] Panel; Future    4. Dermatitis    - Amb External Referral To Dermatology    5. Need for hepatitis C screening test    - Hepatitis C Antibody; Future    6. Encounter for screening for HIV    - HIV Screen; Future          Please note, this report has been partially produced using speech recognition software  and may cause  and /or contain errors related to that system including grammar, punctuation and spelling as well as words and phrases that may seem inappropriate. If there are questions or concerns please feel free to contact me to clarify.

## 2022-08-20 LAB
HEPATITIS C ANTIBODY: NONREACTIVE
HIV AG/AB: NONREACTIVE

## 2022-08-30 ENCOUNTER — OFFICE VISIT (OUTPATIENT)
Dept: BEHAVIORAL/MENTAL HEALTH CLINIC | Age: 27
End: 2022-08-30
Payer: COMMERCIAL

## 2022-08-30 DIAGNOSIS — F41.9 ANXIETY DISORDER, UNSPECIFIED TYPE: ICD-10-CM

## 2022-08-30 DIAGNOSIS — F32.A DEPRESSION, UNSPECIFIED DEPRESSION TYPE: ICD-10-CM

## 2022-08-30 DIAGNOSIS — F43.81 PROLONGED GRIEF DISORDER: Primary | ICD-10-CM

## 2022-08-30 PROCEDURE — 90791 PSYCH DIAGNOSTIC EVALUATION: CPT | Performed by: PSYCHOLOGIST

## 2022-08-30 PROCEDURE — 1036F TOBACCO NON-USER: CPT | Performed by: PSYCHOLOGIST

## 2022-08-30 ASSESSMENT — ANXIETY QUESTIONNAIRES
IF YOU CHECKED OFF ANY PROBLEMS ON THIS QUESTIONNAIRE, HOW DIFFICULT HAVE THESE PROBLEMS MADE IT FOR YOU TO DO YOUR WORK, TAKE CARE OF THINGS AT HOME, OR GET ALONG WITH OTHER PEOPLE: SOMEWHAT DIFFICULT
IF YOU CHECKED OFF ANY PROBLEMS ON THIS QUESTIONNAIRE, HOW DIFFICULT HAVE THESE PROBLEMS MADE IT FOR YOU TO DO YOUR WORK, TAKE CARE OF THINGS AT HOME, OR GET ALONG WITH OTHER PEOPLE: SOMEWHAT DIFFICULT
3. WORRYING TOO MUCH ABOUT DIFFERENT THINGS: MORE THAN HALF THE DAYS
3. WORRYING TOO MUCH ABOUT DIFFERENT THINGS: 2
7. FEELING AFRAID AS IF SOMETHING AWFUL MIGHT HAPPEN: SEVERAL DAYS
5. BEING SO RESTLESS THAT IT IS HARD TO SIT STILL: 1
2. NOT BEING ABLE TO STOP OR CONTROL WORRYING: 2
6. BECOMING EASILY ANNOYED OR IRRITABLE: 2
7. FEELING AFRAID AS IF SOMETHING AWFUL MIGHT HAPPEN: 1
1. FEELING NERVOUS, ANXIOUS, OR ON EDGE: 1
4. TROUBLE RELAXING: 1
4. TROUBLE RELAXING: SEVERAL DAYS
7. FEELING AFRAID AS IF SOMETHING AWFUL MIGHT HAPPEN: 1
5. BEING SO RESTLESS THAT IT IS HARD TO SIT STILL: SEVERAL DAYS
GAD7 TOTAL SCORE: 10
2. NOT BEING ABLE TO STOP OR CONTROL WORRYING: 3
6. BECOMING EASILY ANNOYED OR IRRITABLE: 2
1. FEELING NERVOUS, ANXIOUS, OR ON EDGE: 2
1. FEELING NERVOUS, ANXIOUS, OR ON EDGE: SEVERAL DAYS
2. NOT BEING ABLE TO STOP OR CONTROL WORRYING: MORE THAN HALF THE DAYS
4. TROUBLE RELAXING: 1
GAD7 TOTAL SCORE: 14
IF YOU CHECKED OFF ANY PROBLEMS ON THIS QUESTIONNAIRE, HOW DIFFICULT HAVE THESE PROBLEMS MADE IT FOR YOU TO DO YOUR WORK, TAKE CARE OF THINGS AT HOME, OR GET ALONG WITH OTHER PEOPLE: SOMEWHAT DIFFICULT
5. BEING SO RESTLESS THAT IT IS HARD TO SIT STILL: 2
6. BECOMING EASILY ANNOYED OR IRRITABLE: MORE THAN HALF THE DAYS
3. WORRYING TOO MUCH ABOUT DIFFERENT THINGS: 3

## 2022-08-30 ASSESSMENT — PATIENT HEALTH QUESTIONNAIRE - PHQ9
9. THOUGHTS THAT YOU WOULD BE BETTER OFF DEAD, OR OF HURTING YOURSELF: 0
2. FEELING DOWN, DEPRESSED OR HOPELESS: 1
SUM OF ALL RESPONSES TO PHQ QUESTIONS 1-9: 14
4. FEELING TIRED OR HAVING LITTLE ENERGY: 3
SUM OF ALL RESPONSES TO PHQ QUESTIONS 1-9: 14
SUM OF ALL RESPONSES TO PHQ9 QUESTIONS 1 & 2: 2
10. IF YOU CHECKED OFF ANY PROBLEMS, HOW DIFFICULT HAVE THESE PROBLEMS MADE IT FOR YOU TO DO YOUR WORK, TAKE CARE OF THINGS AT HOME, OR GET ALONG WITH OTHER PEOPLE: 1
3. TROUBLE FALLING OR STAYING ASLEEP: 3
SUM OF ALL RESPONSES TO PHQ QUESTIONS 1-9: 14
5. POOR APPETITE OR OVEREATING: 3
SUM OF ALL RESPONSES TO PHQ QUESTIONS 1-9: 14
1. LITTLE INTEREST OR PLEASURE IN DOING THINGS: 1
8. MOVING OR SPEAKING SO SLOWLY THAT OTHER PEOPLE COULD HAVE NOTICED. OR THE OPPOSITE, BEING SO FIGETY OR RESTLESS THAT YOU HAVE BEEN MOVING AROUND A LOT MORE THAN USUAL: 0
7. TROUBLE CONCENTRATING ON THINGS, SUCH AS READING THE NEWSPAPER OR WATCHING TELEVISION: 1
6. FEELING BAD ABOUT YOURSELF - OR THAT YOU ARE A FAILURE OR HAVE LET YOURSELF OR YOUR FAMILY DOWN: 2

## 2022-08-30 ASSESSMENT — COLUMBIA-SUICIDE SEVERITY RATING SCALE - C-SSRS
6. HAVE YOU EVER DONE ANYTHING, STARTED TO DO ANYTHING, OR PREPARED TO DO ANYTHING TO END YOUR LIFE?: NO
2. HAVE YOU ACTUALLY HAD ANY THOUGHTS OF KILLING YOURSELF?: NO
1. WITHIN THE PAST MONTH, HAVE YOU WISHED YOU WERE DEAD OR WISHED YOU COULD GO TO SLEEP AND NOT WAKE UP?: NO

## 2022-08-30 ASSESSMENT — LIFESTYLE VARIABLES
HISTORY_ALCOHOL_USE: NO
HAVE YOU EVER RECEIVED ALCOHOL OR OTHER DRUG ABUSE TREATMENT: NO

## 2022-08-30 NOTE — PROGRESS NOTES
Behavioral Health Consultation  Reynaldo Childs PsyD, [facility]  Psychologist  22  4:05 PM EDT      Time spent with Patient: 30 minutes  This is patient's first  [facility] appointment. Reason for Consult:  depression  Referring Provider: Pradeep Cuenca MD    Patient provided informed consent for the behavioral health program. Discussed with patient model of service to include the limits of confidentiality (i.e. abuse reporting, suicide intervention, etc.) and short-term intervention focused approach. Patient indicated understanding. Feedback given to PCP. S:  The patient was raised in [place], [place] by her biological mother; patient noted her father was not part of her childhood until she was 17. Patient has 6 half-siblings and 4 step-siblings; patient noted a younger half-sister  when patient was 17. Patient denied history of childhood abuse or trauma. The patient has a [employer] school ([employer]) level of education () and is currently employed as an attendant at a [employer]. Patient has never been  and has 1 child (daughter), age 3. The patient currently resides with her daughter. Patient reported a brief history of past outpatient mental health treatment as a child. Patient denied any history of past suicide attempts and inpatient psychiatric treatment. Patient is not currently prescribed psychiatric medication. Patient described their mood as \"kind of, juan; just there. \" The patient reported their sleep as \"I definitely oversleep, a lot;\" they sleep approximately 8-16 hours per 24-hour period, on average. Patient acknowledged occasional afternoon napping; \"It is my coping really. \" Family history of mental health issues includes: \"My mom has been diagnosed with depression and anxiety, my dad has ADHD as well as my brothers from him. \"    Patient denied use of alcohol. Patient does not use nicotine products. Patient denied using marijuana.  The patient denied any other Vaping Use    Vaping Use: Never used   Substance and Sexual Activity    Alcohol use: Yes    Drug use: Never    Sexual activity: Not Currently     Birth control/protection: Injection   Other Topics Concern    Not on file   Social History Narrative    Has 1 child 2017 Girl- Purnima     Born [place] moved [place] [facility] stayed there for 8 years     Hobbies: sleep, likes to walk around      Social Determinants of Health     Financial Resource Strain: Low Risk     Difficulty of Paying Living Expenses: Not hard at all   Food Insecurity: No Food Insecurity    Worried About [address] in the Last Year: Never true    [address] [place] [address] in the Last Year: Never true   Transportation Needs: No Transportation Needs    Lack of Transportation (Medical): No    Lack of Transportation (Non-Medical): No   Physical Activity: Not on file   Stress: Not on file   Social Connections: Not on file   Intimate Partner Violence: Not on file   Housing Stability: Not on file       TOBACCO:   reports that she has never smoked. She has never used smokeless tobacco.  ETOH:   reports current alcohol use. Family History:   Family History   Problem Relation Age of Onset    No Known Problems Mother     No Known Problems Father          A:  Administered the PHQ-9, which indicates a self report of moderate depression. Patient was administered the HOLLY-7, which indicates a self report of moderate anxiety. Patient would benefit from [facility] services to increase coping skills to provide symptom management/control/relief. Patient expressed a desire to address complicated grief related to loss of her sister, as well as abandonment issues related to her father being absent during her childhood; she is interested in ongoing traditional psychotherapy/counseling. Patient was provided with a list of specialty [place] [address] providers in the community and informed she may always call and schedule [facility] services should she want/need additional assistance in the future. PHQ Scores 8/30/2022 8/19/2022 3/15/2021 12/8/2020   PHQ2 Score 2 0 5 0   PHQ9 Score 14 0 19 0     Interpretation of Total Score Depression Severity: 1-4 = Minimal depression, 5-9 = Mild depression, 10-14 = Moderate depression, 15-19 = Moderately severe depression, 20-27 = Severe depression      HOLLY 7 SCORE 8/30/2022 8/30/2022   HOLLY-7 Total Score 14 10     Interpretation of HOLLY-7 score: 5-9 = mild anxiety, 10-14 = moderate anxiety, 15+ = severe anxiety. Recommend referral to behavioral health for scores 10 or greater. Diagnosis:    1. Prolonged grief disorder    2. Depression, unspecified depression type    3. Anxiety disorder, unspecified type       R/O MDD     R/O HOLLY        Diagnosis Date    Class 1 obesity due to excess calories without serious comorbidity with body mass index (BMI) of 34.0 to 34.9 in adult     Current episode of major depressive disorder without prior episode            Plan:  Return if symptoms worsen or fail to improve.      Pt interventions:  Established rapport, Conducted functional assessment, Alma-setting to identify pt's primary goals for [facility] visit / overall health, Supportive techniques, Emphasized self-care as important for managing overall health, Provided Psychoeducation re: [facility] role in primary care, [facility] services vs traditional psychotherapy, prolonged grief, Collaborative treatment planning,Clarified role of [facility] in primary care,Recommended that pt establish with a mental health clinician with whom they can meet regularly for psychotherapy services, and Reviewed options for identifying appropriate providers      Pt Behavioral Change Plan:  Patient will establish care with specialty [facility] provider for ongoing regular psychotherapy/counseling services         Please note this report has been partially produced using speech recognition software and may cause contain errors related to that system including grammar, punctuation, and spelling, as well as words and phrases that may seem inappropriate. If there are questions or concerns please feel free to contact me to clarify.

## 2022-08-30 NOTE — PATIENT INSTRUCTIONS
[address]:    Emergency or crisis issues for mental health concerns should be handled through the 24-Hour Hotline 1 99 947456    A new crisis text resource available for [facility]: Text 4HOPE to 471132 and get a reply from a trained Crisis Counselor    You can identify providers or availability of services at the [address] Non-Emergency Navigator: [address]:  Matty Handley  1910 [facility],   [address]  [address] Yuniel Coat is moving 2/15/18:  New location: [facility] 62, [address] [facility] [address], 700 [facility]  [facility]  [phone]  Gravette Labolt:  [facility]  [phone]  [address] [place] [address]. Julienne Murphy  [facility]  [phone]    AGENCIES SERVING ADULTS    [place]:  [facility] AND EAR [facility]  [phone]  Matty Handley  [phone]  [place]:  [facility]  [phone]  [place]:  [facility]  [phone]  [address] [place] [address]. [address]  56  [address] (two locations)  [address] 43, [address] [place] [address] [address]  [phone]  (Services include: Psychiatry, adult & pediatrics, therapy, evaluation, addiction services)    Sierra Mcpherson (through the [facility])  [phone]    The Sierra Macdonaldnegin is a peer resource available Monday through Friday, from 1pm-8pm. The Sierra Mcpherson provides a safe, confidential place to talk and be heard. To speak to a , call [phone] and ask to be connected to the [facility]. [place] Intensive Outpatient Services (IOP)  [phone]  [facility],   [address] [place] [address]  Deidra De La Fuente 70  [phone],  [phone]  (Services include: Psychiatry, therapy, evaluation, addiction services)    [facility]  [address]  [address] [facility] [phone]  (Services include: Psychiatry, adult & pediatrics, therapy, evaluation, addiction services)    [address] [facility] (2 locations)     [address] [place] [address] [facility]   [facility]    Kamille Mohamud 79  [phone]  (Services include: Psychiatry, adult & pediatrics, therapy, evaluation, addiction services)     Psych and Psych  [address], [facility] Street  [phone]  (Services include: Psychiatry, adult & pediatrics, therapy, evaluation, addiction services)    [place] [address], 295 [facility], [address] [place] [address] Street  [phone]  (Services include: PEDIATRIC Psychiatry, family, in home & school based services)    50 [facility], 562 [facility], [address] [place] [address] Road  [phone]  (Services include: [address] Psychiatry, family, in home & school based services)    Intensive Outpatient Services (IOP)    [facility]  To contact the [address] [place] [address] program or to make a referral, please call the IOP office at [phone]. We are located at:  Mood Disorders Intensive Outpatient Program  [facility]  [address], [address] [place] [address] 100  [place] [address]. [facility]  For more information about [facility], call [phone]. [address] .[phone]   [facility] Daytime .[phone]   [facility] Afternoon .[phone]   [facility] Dual Diagnosis .[phone]      Psych [address], ESPOO, [address]  Phone: [phone]    Adolescent IOP  Six-Week Program  18 Sessions  Tuesday and Thursday  3:00 to 6:00 p.m. Saturday  10:00 a.m. to 1:00 p.m. Parents join on Saturday    Adult IOP  Six-Week Program  18 Sessions   Monday, Wednesday and Friday  5:30 to 8:30 p.m.     For more information about the IOP services in [facility],   please call the Intake Department at [phone]. [facility]:    900 [facility]   500 [facility] #3  [place], [address] [facility] [address] [facility] [address]   [place], 1266 [facility]  [phone]    [address]:    [facility] (2 locations)    701 [facility].     [facility] 112, [address]     Yasemin De Santiago, 901 [facility]  [phone]      Private Providers    [facility] (numerous locations)    [facility] ([facility])  Jeanie [address]       Numerous locations  [facility], [address] [facility] [address]       901 [facility] (2 locations)  [address]    [address] [facility] Street., [address] [facility], [address] [address]  772 478 [facility] or  [phone]    Dr Jennifer Ma, [facility] 42  [address] [place] [address] [facility]  [phone]    [facility] Psychiatry  Numerous locations  and virtual appointments  [phone]  www. [employer]    Dr. Victoria Ko  [address] [place] [address] Road [address]  [address]  [phone]    Dr. Yarely Ramos and Dr. Antonia Sosa     [address] [place] [address] Road     [phone]         Substance Abuse Resources    Alcoholics Anonymous   Narcotic Anonymous  www.[place]county. org   www.naohio. org  [phone]     [place] (numerous locations)  www.smartrecovery. org   Jesse Garcia 91   [phone]    [facility], [address] [facility]         [phone] [address]   [address]  [place], [facility]   [address]  [phone]    Road to [facility] 82  [phone]    Emergency or crisis issues for substance abuse or addiction should be handled through the 24-Hour Hotline (689) 407-0724 or  (917) 224-9338    [address] on [address] [facility] [address] jama SMITH of [facility]     [address], [address] Joyce Finn, [address] [facility]   [phone]    [facility] on Aging  http://lcooa. org  Address: 1 [facility] 270, [facility], [address]. [facility]  Phone: [phone]    [facility], [address] [place] [address]  [phone] Toll-Free   Administrative/Helpline  ([phone]) [phone] Voice   Administrative/Helpline  ([phone] 550-9711 Voice   24-Hour Helpline  http://www. Glaxstarer. org  Service description: Provides weekly domestic violence support groups to educate, support and assist women experiencing domestic violence and other similar situations. Offers a support group specific to LGBTQI. Intake procedure: Call the main office for meeting times and locations. Fees:Free. Eligibility :Serves victims of domestic violence in [facility]. Hours: Varies. Call for details.  RESPITE    The [address] [place] [address] Ave., [place], [address] [facility] [address]  Phone: [phone]    https://Padcom.[employer]/    8202 [facility] Crisis Hotline:  5-151.636.6132    National Suicide Prevention Lifeline:  (577) 480-WFVU (2403)  www.suicidepreventionlifeline. [address] [facility] Hotline:  (800) [place] (938-6115)  Www.youthline. [facility] [employer]:  (772) 749-5197 and Press 1  Text 547027  www. veteranscrisisline. net    211 First Call For Help: dial 211 or go to   www. 211lorain. org for a variety of   community services including assistance  with housing, utilities, food, healthcare, etc.      Vocation and [facility] One [address])  The Employment netWork   [address] [facility] [phone]     The Employment net[employer], [address] [facility], is a partnership of over twenty agencies and organizations that have joined together to deliver a comprehensive system of high quality, customer-friendly services designed to meet the education, training, employment or supportive service needs of the community. [place] of vocational rehabilitation (BVR)    Tank is served through the [facility] office at:   Lucian Ram., [address] [place] [address], [facility] Mathias Moritz 723   [phone]  [phone]  Toll-free [phone]

## 2022-10-14 ENCOUNTER — OFFICE VISIT (OUTPATIENT)
Dept: OBGYN CLINIC | Age: 27
End: 2022-10-14
Payer: COMMERCIAL

## 2022-10-14 VITALS
HEIGHT: 66 IN | SYSTOLIC BLOOD PRESSURE: 112 MMHG | DIASTOLIC BLOOD PRESSURE: 72 MMHG | BODY MASS INDEX: 34.87 KG/M2 | WEIGHT: 217 LBS

## 2022-10-14 DIAGNOSIS — N89.8 VAGINAL DISCHARGE: ICD-10-CM

## 2022-10-14 DIAGNOSIS — Z11.3 SCREENING FOR STD (SEXUALLY TRANSMITTED DISEASE): ICD-10-CM

## 2022-10-14 DIAGNOSIS — Z11.3 SCREENING FOR STD (SEXUALLY TRANSMITTED DISEASE): Primary | ICD-10-CM

## 2022-10-14 PROCEDURE — 1036F TOBACCO NON-USER: CPT | Performed by: OBSTETRICS & GYNECOLOGY

## 2022-10-14 PROCEDURE — G8417 CALC BMI ABV UP PARAM F/U: HCPCS | Performed by: OBSTETRICS & GYNECOLOGY

## 2022-10-14 PROCEDURE — G8427 DOCREV CUR MEDS BY ELIG CLIN: HCPCS | Performed by: OBSTETRICS & GYNECOLOGY

## 2022-10-14 PROCEDURE — G8484 FLU IMMUNIZE NO ADMIN: HCPCS | Performed by: OBSTETRICS & GYNECOLOGY

## 2022-10-14 PROCEDURE — 99214 OFFICE O/P EST MOD 30 MIN: CPT | Performed by: OBSTETRICS & GYNECOLOGY

## 2022-10-14 NOTE — PROGRESS NOTES
Subjective:      Patient ID:  Luis Sawant is a 24 y.o. female with chief complaint of:  Chief Complaint   Patient presents with    Sexually Transmitted Diseases     Pt here today for std screening       Patient presents for sti counseling. No abnormal bleeding  no itching no burning  some discharge. No fever or chills no pelvic pain      Past Medical History:   Diagnosis Date    Class 1 obesity due to excess calories without serious comorbidity with body mass index (BMI) of 34.0 to 34.9 in adult     Current episode of major depressive disorder without prior episode      No past surgical history on file. Family History   Problem Relation Age of Onset    No Known Problems Mother     No Known Problems Father      Current Outpatient Medications on File Prior to Visit   Medication Sig Dispense Refill    ibuprofen (ADVIL;MOTRIN) 400 MG tablet Take 1 tablet by mouth every 6 hours as needed for Pain 120 tablet 3    norethindrone-ethinyl estradiol (MICROGESTIN FE 1/20) 1-20 MG-MCG per tablet Take 1 tablet by mouth daily for 28 days 1 packet 10     No current facility-administered medications on file prior to visit. Allergies:  Sulfamethoxazole-trimethoprim    Review of Systems   Constitutional:  Negative for fatigue and fever. Respiratory:  Negative for apnea and shortness of breath. Cardiovascular:  Negative for chest pain and palpitations. Gastrointestinal:  Negative for abdominal pain. Genitourinary:  Positive for vaginal discharge. Negative for difficulty urinating, dyspareunia, dysuria, pelvic pain and vaginal bleeding. Neurological:  Negative for dizziness, weakness and light-headedness. Psychiatric/Behavioral:  Negative for agitation and dysphoric mood. Objective:   /72   Ht 5' 6\" (1.676 m)   Wt 217 lb (98.4 kg)   BMI 35.02 kg/m²      Physical Exam  Constitutional:       Appearance: Normal appearance. She is well-developed.    Eyes:      Pupils: Pupils are equal, round, and reactive to light. Cardiovascular:      Rate and Rhythm: Normal rate and regular rhythm. Heart sounds: Normal heart sounds. Pulmonary:      Effort: Pulmonary effort is normal.   Abdominal:      General: Bowel sounds are normal.      Palpations: Abdomen is soft. Genitourinary:     Labia:         Right: No rash, tenderness or lesion. Left: No rash, tenderness or lesion. Vagina: No vaginal discharge, erythema, tenderness, bleeding or prolapsed vaginal walls. Cervix: No discharge or lesion. Uterus: Not enlarged and not tender. Adnexa:         Right: No mass, tenderness or fullness. Left: No mass, tenderness or fullness. Neurological:      Mental Status: She is alert and oriented to person, place, and time. Psychiatric:         Mood and Affect: Mood normal.         Behavior: Behavior normal.       Assessment:       Diagnosis Orders   1. Screening for STD (sexually transmitted disease)  Wet prep, genital    C.trachomatis N.gonorrhoeae DNA      2. Vaginal discharge  Wet prep, genital    C.trachomatis N.gonorrhoeae DNA            Plan:      Orders Placed This Encounter   Procedures    Wet prep, genital     Standing Status:   Future     Number of Occurrences:   1     Standing Expiration Date:   10/14/2023    C.trachomatis N.gonorrhoeae DNA     Standing Status:   Future     Number of Occurrences:   1     Standing Expiration Date:   10/14/2023     No orders of the defined types were placed in this encounter. Return in about 2 years (around 10/14/2024) for annual examination.      Grabiel Rosales DO

## 2022-10-18 LAB
C TRACH DNA GENITAL QL NAA+PROBE: NEGATIVE
N. GONORRHOEAE DNA: NEGATIVE

## 2022-10-18 ASSESSMENT — ENCOUNTER SYMPTOMS
SHORTNESS OF BREATH: 0
ABDOMINAL PAIN: 0
APNEA: 0

## 2022-11-18 ENCOUNTER — OFFICE VISIT (OUTPATIENT)
Dept: FAMILY MEDICINE CLINIC | Age: 27
End: 2022-11-18
Payer: COMMERCIAL

## 2022-11-18 VITALS
WEIGHT: 217 LBS | HEART RATE: 88 BPM | OXYGEN SATURATION: 98 % | DIASTOLIC BLOOD PRESSURE: 78 MMHG | HEIGHT: 66 IN | RESPIRATION RATE: 14 BRPM | BODY MASS INDEX: 34.87 KG/M2 | TEMPERATURE: 98 F | SYSTOLIC BLOOD PRESSURE: 102 MMHG

## 2022-11-18 DIAGNOSIS — J35.8 CRYPTIC TONSIL: ICD-10-CM

## 2022-11-18 DIAGNOSIS — J06.9 URI WITH COUGH AND CONGESTION: ICD-10-CM

## 2022-11-18 DIAGNOSIS — J02.0 ACUTE STREPTOCOCCAL PHARYNGITIS: Primary | ICD-10-CM

## 2022-11-18 LAB
INFLUENZA A ANTIBODY: NORMAL
INFLUENZA B ANTIBODY: NORMAL
Lab: NORMAL
PERFORMING INSTRUMENT: NORMAL
QC PASS/FAIL: NORMAL
S PYO AG THROAT QL: POSITIVE
SARS-COV-2, POC: NORMAL

## 2022-11-18 PROCEDURE — 87804 INFLUENZA ASSAY W/OPTIC: CPT | Performed by: PHYSICIAN ASSISTANT

## 2022-11-18 PROCEDURE — G8484 FLU IMMUNIZE NO ADMIN: HCPCS | Performed by: PHYSICIAN ASSISTANT

## 2022-11-18 PROCEDURE — 1036F TOBACCO NON-USER: CPT | Performed by: PHYSICIAN ASSISTANT

## 2022-11-18 PROCEDURE — 87426 SARSCOV CORONAVIRUS AG IA: CPT | Performed by: PHYSICIAN ASSISTANT

## 2022-11-18 PROCEDURE — G8417 CALC BMI ABV UP PARAM F/U: HCPCS | Performed by: PHYSICIAN ASSISTANT

## 2022-11-18 PROCEDURE — G8427 DOCREV CUR MEDS BY ELIG CLIN: HCPCS | Performed by: PHYSICIAN ASSISTANT

## 2022-11-18 PROCEDURE — 99213 OFFICE O/P EST LOW 20 MIN: CPT | Performed by: PHYSICIAN ASSISTANT

## 2022-11-18 PROCEDURE — 87880 STREP A ASSAY W/OPTIC: CPT | Performed by: PHYSICIAN ASSISTANT

## 2022-11-18 RX ORDER — AMOXICILLIN 500 MG/1
500 CAPSULE ORAL 2 TIMES DAILY
Qty: 20 CAPSULE | Refills: 0 | Status: SHIPPED | OUTPATIENT
Start: 2022-11-18 | End: 2022-11-28

## 2022-11-18 ASSESSMENT — ENCOUNTER SYMPTOMS
BACK PAIN: 0
ABDOMINAL PAIN: 0
VOMITING: 0
SHORTNESS OF BREATH: 0
TROUBLE SWALLOWING: 0
CHEST TIGHTNESS: 0
DIARRHEA: 0
SORE THROAT: 1
SINUS PRESSURE: 0
COUGH: 0

## 2022-11-18 ASSESSMENT — PATIENT HEALTH QUESTIONNAIRE - PHQ9
1. LITTLE INTEREST OR PLEASURE IN DOING THINGS: 0
9. THOUGHTS THAT YOU WOULD BE BETTER OFF DEAD, OR OF HURTING YOURSELF: 0
10. IF YOU CHECKED OFF ANY PROBLEMS, HOW DIFFICULT HAVE THESE PROBLEMS MADE IT FOR YOU TO DO YOUR WORK, TAKE CARE OF THINGS AT HOME, OR GET ALONG WITH OTHER PEOPLE: 0
2. FEELING DOWN, DEPRESSED OR HOPELESS: 0
SUM OF ALL RESPONSES TO PHQ QUESTIONS 1-9: 0
SUM OF ALL RESPONSES TO PHQ9 QUESTIONS 1 & 2: 0
SUM OF ALL RESPONSES TO PHQ QUESTIONS 1-9: 0
3. TROUBLE FALLING OR STAYING ASLEEP: 0
5. POOR APPETITE OR OVEREATING: 0
8. MOVING OR SPEAKING SO SLOWLY THAT OTHER PEOPLE COULD HAVE NOTICED. OR THE OPPOSITE, BEING SO FIGETY OR RESTLESS THAT YOU HAVE BEEN MOVING AROUND A LOT MORE THAN USUAL: 0
6. FEELING BAD ABOUT YOURSELF - OR THAT YOU ARE A FAILURE OR HAVE LET YOURSELF OR YOUR FAMILY DOWN: 0
SUM OF ALL RESPONSES TO PHQ QUESTIONS 1-9: 0
4. FEELING TIRED OR HAVING LITTLE ENERGY: 0
SUM OF ALL RESPONSES TO PHQ QUESTIONS 1-9: 0
7. TROUBLE CONCENTRATING ON THINGS, SUCH AS READING THE NEWSPAPER OR WATCHING TELEVISION: 0

## 2022-11-18 NOTE — PROGRESS NOTES
Subjective:      Patient ID: Nuno Wills is a 24 y.o. female who presents today for:  Chief Complaint   Patient presents with    Cough     Congestion and sore throat sx started on and off x 1 month does have hx of tonsil stones       HPI   24year old female who presents with complaints of cough and congestion, sore throat intermittently for the past month    Past Medical History:   Diagnosis Date    Class 1 obesity due to excess calories without serious comorbidity with body mass index (BMI) of 34.0 to 34.9 in adult     Current episode of major depressive disorder without prior episode      No past surgical history on file. Social History     Socioeconomic History    Marital status: Single     Spouse name: Not on file    Number of children: Not on file    Years of education: Not on file    Highest education level: Not on file   Occupational History    Not on file   Tobacco Use    Smoking status: Never    Smokeless tobacco: Never   Vaping Use    Vaping Use: Never used   Substance and Sexual Activity    Alcohol use: Yes    Drug use: Never    Sexual activity: Not Currently     Birth control/protection: Injection   Other Topics Concern    Not on file   Social History Narrative    Has 1 child 2017 Girl- Azaelia     [place] moved to [facility] stayed there for 8 years     Hobbies: sleep, likes to walk around      Social Determinants of Health     Financial Resource Strain: Low Risk     Difficulty of Paying Living Expenses: Not hard at all   Food Insecurity: No Food Insecurity    Worried About Running Out of Food in the Last Year: Never true    [address] in the Last Year: Never true   Transportation Needs: No Transportation Needs    Lack of Transportation (Medical): No    Lack of Transportation (Non-Medical):  No   Physical Activity: Not on file   Stress: Not on file   Social Connections: Not on file   Intimate Partner Violence: Not on file   Housing Stability: Not on file     Family History   Problem Relation Age of Onset    No Known Problems Mother     No Known Problems Father      Allergies   Allergen Reactions    Sulfamethoxazole-Trimethoprim      Current Outpatient Medications   Medication Sig Dispense Refill    amoxicillin (AMOXIL) 500 MG capsule Take 1 capsule by mouth 2 times daily for 10 days 20 capsule 0    ibuprofen (ADVIL;MOTRIN) 400 MG tablet Take 1 tablet by mouth every 6 hours as needed for Pain 120 tablet 3    norethindrone-ethinyl estradiol (MICROGESTIN FE 1/20) 1-20 MG-MCG per tablet Take 1 tablet by mouth daily for 28 days 1 packet 10     No current facility-administered medications for this visit. Review of Systems   Constitutional:  Negative for activity change, appetite change, chills, fever and unexpected weight change. HENT:  Positive for congestion and sore throat. Negative for drooling, ear pain, nosebleeds, sinus pressure and trouble swallowing. Respiratory:  Negative for cough, chest tightness and shortness of breath. Cardiovascular:  Negative for chest pain and leg swelling. Gastrointestinal:  Negative for abdominal pain, diarrhea and vomiting. Endocrine: Negative for polydipsia and polyphagia. Genitourinary:  Negative for dysuria, flank pain and frequency. Musculoskeletal:  Negative for back pain and myalgias. Skin:  Negative for pallor and rash. Neurological:  Negative for syncope, weakness and headaches. Hematological:  Does not bruise/bleed easily. Psychiatric/Behavioral:  Negative for agitation, behavioral problems and confusion. All other systems reviewed and are negative. Objective:   /78 (Site: Right Upper Arm, Position: Sitting, Cuff Size: Medium Adult)   Pulse 88   Temp 98 °F (36.7 °C) (Temporal)   Resp 14   Ht 5' 6\" (1.676 m)   Wt 217 lb (98.4 kg)   LMP 11/18/2022   SpO2 98%   BMI 35.02 kg/m²     Physical Exam  Vitals and nursing note reviewed. Constitutional:       General: She is awake. She is not in acute distress. Appearance: Normal appearance. She is well-developed and normal weight. She is not ill-appearing, toxic-appearing or diaphoretic. Comments: No photophobia. No phonophobia. HENT:      Head: Normocephalic and atraumatic. No Crespo's sign. Right Ear: External ear normal.      Left Ear: External ear normal.      Nose: Nose normal. No congestion or rhinorrhea. Mouth/Throat:      Mouth: Mucous membranes are moist.      Pharynx: Oropharynx is clear. No oropharyngeal exudate or posterior oropharyngeal erythema. Eyes:      General: No scleral icterus. Right eye: No foreign body or discharge. Left eye: No discharge. Extraocular Movements: Extraocular movements intact. Conjunctiva/sclera: Conjunctivae normal.      Left eye: No exudate. Pupils: Pupils are equal, round, and reactive to light. Neck:      Vascular: No JVD. Trachea: No tracheal deviation. Comments: No meningismus. Cardiovascular:      Rate and Rhythm: Normal rate and regular rhythm. Heart sounds: Normal heart sounds. Heart sounds not distant. No murmur heard. No friction rub. No gallop. Pulmonary:      Effort: Pulmonary effort is normal. No respiratory distress. Breath sounds: Normal breath sounds. No stridor. No wheezing, rhonchi or rales. Chest:      Chest wall: No tenderness. Abdominal:      General: Abdomen is flat. Bowel sounds are normal. There is no distension. Palpations: Abdomen is soft. There is no mass. Tenderness: There is no abdominal tenderness. There is no right CVA tenderness, left CVA tenderness, guarding or rebound. Hernia: No hernia is present. Musculoskeletal:         General: No swelling, tenderness, deformity or signs of injury. Normal range of motion. Cervical back: Normal range of motion and neck supple. No rigidity. Lymphadenopathy:      Head:      Right side of head: No submental adenopathy.       Left side of head: No submental adenopathy. Skin:     General: Skin is warm and dry. Capillary Refill: Capillary refill takes less than 2 seconds. Coloration: Skin is not jaundiced or pale. Findings: No bruising, erythema, lesion or rash. Neurological:      General: No focal deficit present. Mental Status: She is alert and oriented to person, place, and time. Mental status is at baseline. Cranial Nerves: No cranial nerve deficit. Sensory: No sensory deficit. Motor: No weakness. Coordination: Coordination normal.      Deep Tendon Reflexes: Reflexes are normal and symmetric. Psychiatric:         Mood and Affect: Mood normal.         Behavior: Behavior normal. Behavior is cooperative. Thought Content: Thought content normal.         Judgment: Judgment normal.       Assessment:       Diagnosis Orders   1. Acute streptococcal pharyngitis  amoxicillin (AMOXIL) 500 MG capsule    [facility]      2. URI with cough and congestion  POCT COVID-19, Antigen    POCT Influenza A/B    POCT rapid strep A    amoxicillin (AMOXIL) 500 MG capsule    [facility]      3. Cryptic tonsil  amoxicillin (AMOXIL) 500 MG capsule    MARIAH Lee MD, Otolaryngology, [facility]        Results for POC orders placed in visit on 11/18/22   POCT Influenza A/B   Result Value Ref Range    Influenza A Ab neg     Influenza B Ab neg    POCT rapid strep A   Result Value Ref Range    Strep A Ag Positive (A) None Detected      Plan:     Assessment & Plan   Sully Price was seen today for cough. Diagnoses and all orders for this visit:    Acute streptococcal pharyngitis  -     amoxicillin (AMOXIL) 500 MG capsule;  Take 1 capsule by mouth 2 times daily for 10 days  -     MARIAH Lee MD, Otolaryngology, [facility]    URI with cough and congestion  -     POCT COVID-19, Antigen  -     POCT Influenza A/B  -     POCT rapid strep A  - amoxicillin (AMOXIL) 500 MG capsule; Take 1 capsule by mouth 2 times daily for 10 days  -     MARIAH Pena MD, Otolaryngology, [place] [address]    Cryptic tonsil  -     amoxicillin (AMOXIL) 500 MG capsule; Take 1 capsule by mouth 2 times daily for 10 days  -     MARIAH Pena MD, Otolaryngology, [address] [place] [address] This Encounter   Procedures    Claudia Wynn MD, Otolaryngology, [place] [address]     Referral Priority:   Routine     Referral Type:   Eval and Treat     Referral Reason:   Specialty Services Required     Referred to Provider:   Kaylee Santiago MD     Requested Specialty:   Otolaryngology     Number of Visits Requested:   1    POCT COVID-19, Antigen     Order Specific Question:   Is this test for diagnosis or screening? Answer:   Diagnosis of ill patient     Order Specific Question:   Symptomatic for COVID-19 as defined by CDC? Answer:   Yes     Order Specific Question:   Date of Symptom Onset     Answer:   11/15/2022     Order Specific Question:   Hospitalized for COVID-19? Answer:   No     Order Specific Question:   Admitted to ICU for COVID-19? Answer:   No     Order Specific Question:   Employed in healthcare setting? Answer:   No     Order Specific Question:   Resident in a congregate (group) care setting? Answer:   No     Order Specific Question:   Pregnant? Answer:   No     Order Specific Question:   Previously tested for COVID-19? Answer:   No    POCT Influenza A/B    POCT rapid strep A     Orders Placed This Encounter   Medications    amoxicillin (AMOXIL) 500 MG capsule     Sig: Take 1 capsule by mouth 2 times daily for 10 days     Dispense:  20 capsule     Refill:  0     There are no discontinued medications. Return if symptoms worsen or fail to improve. Reviewed with the patient/family: current clinical status & medications.   Side effects of the medication prescribed today, as well as treatment plan/rationale and result expectations have been discussed with the patient/family who expresses understanding. Patient will be discharged home in stable condition. Follow up with PCP to evaluate treatment results or return if symptoms worsen or fail to improve. Discussed signs and symptoms which require immediate follow-up in ED/call to 911. Understanding verbalized. I have reviewed the patient's medical history in detail and updated the computerized patient record.     HERB Quevedo

## 2023-02-02 ENCOUNTER — OFFICE VISIT (OUTPATIENT)
Dept: OBGYN CLINIC | Age: 28
End: 2023-02-02
Payer: COMMERCIAL

## 2023-02-02 VITALS
SYSTOLIC BLOOD PRESSURE: 118 MMHG | HEART RATE: 85 BPM | DIASTOLIC BLOOD PRESSURE: 70 MMHG | WEIGHT: 213 LBS | BODY MASS INDEX: 34.38 KG/M2

## 2023-02-02 DIAGNOSIS — N94.6 DYSMENORRHEA: Primary | ICD-10-CM

## 2023-02-02 DIAGNOSIS — Z20.2 POSSIBLE EXPOSURE TO STD: ICD-10-CM

## 2023-02-02 DIAGNOSIS — Z30.41 ORAL CONTRACEPTIVE PILL SURVEILLANCE: ICD-10-CM

## 2023-02-02 DIAGNOSIS — Z32.02 URINE PREGNANCY TEST NEGATIVE: ICD-10-CM

## 2023-02-02 PROBLEM — J06.9 URI WITH COUGH AND CONGESTION: Status: RESOLVED | Noted: 2022-11-18 | Resolved: 2023-02-02

## 2023-02-02 PROBLEM — J02.0 ACUTE STREPTOCOCCAL PHARYNGITIS: Status: RESOLVED | Noted: 2022-11-18 | Resolved: 2023-02-02

## 2023-02-02 PROBLEM — J35.8 CRYPTIC TONSIL: Status: RESOLVED | Noted: 2022-11-18 | Resolved: 2023-02-02

## 2023-02-02 PROCEDURE — G8427 DOCREV CUR MEDS BY ELIG CLIN: HCPCS | Performed by: ADVANCED PRACTICE MIDWIFE

## 2023-02-02 PROCEDURE — 99214 OFFICE O/P EST MOD 30 MIN: CPT | Performed by: ADVANCED PRACTICE MIDWIFE

## 2023-02-02 PROCEDURE — G8417 CALC BMI ABV UP PARAM F/U: HCPCS | Performed by: ADVANCED PRACTICE MIDWIFE

## 2023-02-02 PROCEDURE — G8484 FLU IMMUNIZE NO ADMIN: HCPCS | Performed by: ADVANCED PRACTICE MIDWIFE

## 2023-02-02 PROCEDURE — 1036F TOBACCO NON-USER: CPT | Performed by: ADVANCED PRACTICE MIDWIFE

## 2023-02-02 RX ORDER — AMMONIUM LACTATE 12 G/100G
CREAM TOPICAL
COMMUNITY
Start: 2022-12-26

## 2023-02-02 RX ORDER — CLOBETASOL PROPIONATE 0.5 MG/G
CREAM TOPICAL
COMMUNITY
Start: 2022-12-26

## 2023-02-02 RX ORDER — NORETHINDRONE ACETATE AND ETHINYL ESTRADIOL 1MG-20(21)
1 KIT ORAL DAILY
Qty: 90 TABLET | Refills: 3 | Status: SHIPPED | OUTPATIENT
Start: 2023-02-02 | End: 2024-02-02

## 2023-02-02 ASSESSMENT — ENCOUNTER SYMPTOMS
SHORTNESS OF BREATH: 0
COUGH: 0
DIARRHEA: 0
NAUSEA: 0
CONSTIPATION: 0
ABDOMINAL PAIN: 0
VOMITING: 0

## 2023-02-02 NOTE — PROGRESS NOTES
SUBJECTIVE:  Emilia Tarango is a 24 y.o. female who presents here today for complaints of:      Chief Complaint   Patient presents with    Sexually Transmitted Diseases     Would like to get an std screen, no symptoms. Dysmenorrhea, OCP Surveillance  Utilizing hormonal contraception to relieve uncomfortable menstrual symptoms. She has been taking her OCP's as prescribed, has not missed any pills, but did not get her menstrual cycle this month. She is currently 16 days late. Overall she is happy with Microgestin and would like to continue, requesting refills. Screening for STD's  Requesting routine screening for STD's  Pain:  None  Associated symptoms:     Denies fever, headache, malaise, lymphadenopathy, myalgias. Denies dysuria, frequency, urgency. Denies vaginal discharge, irritation, itching, and odor. Review of Systems   Respiratory:  Negative for cough and shortness of breath. Gastrointestinal:  Negative for abdominal pain, constipation, diarrhea, nausea and vomiting. Genitourinary:  Negative for difficulty urinating, dysuria, menstrual problem, pelvic pain, vaginal bleeding and vaginal discharge. All other systems reviewed and are negative. OBJECTIVE:  Vitals:  /70   Pulse 85   Wt 213 lb (96.6 kg)   BMI 34.38 kg/m²     Physical Exam  Appearance:  Normal appearance  Cardiovascular:  Normal rate, Capillary refill less than 2 seconds  Pulmonary:  Normal effort, no distress  Abdominal:  No tenderness  MS:  No Swelling, No dependent edema  Skin:  Warm, dry  Neuro:  Alert and oriented x3, reflexes normal.  Psychiatric:  Normal mood and behavior    ASSESSMENT & PLAN:   Diagnosis Orders   1. Dysmenorrhea  norethindrone-ethinyl estradiol (MICROGESTIN FE 1/20) 1-20 MG-MCG per tablet      2. Oral contraceptive pill surveillance        3. Urine pregnancy test negative        4.  Possible exposure to STD  Trichomonas by EIA    C.trachomatis N.gonorrhoeae DNA, Urine    Culture, Urine          Dysmenorrhea  Utilizing hormonal contraception to relieve uncomfortable menstrual symptoms. Combined OCP Surveillance  UPT - Negative  Happy with Microgestin (1/20), denies adverse SE, wishes to continue. 1 year Rx given. Screening for STD's  Urine collected for culture    Return if symptoms worsen or fail to improve.     Lamont Gray, APRN - CNM

## 2023-02-03 LAB — URINE CULTURE, ROUTINE: NORMAL

## 2023-02-05 LAB
SPECIMEN SOURCE: NORMAL
T. VAGINALIS AMPLIFIED: NEGATIVE

## 2023-02-07 LAB
C. TRACHOMATIS DNA ,URINE: NEGATIVE
N. GONORRHOEAE DNA, URINE: NEGATIVE

## 2023-04-03 RX ORDER — CYCLOBENZAPRINE HCL 5 MG
TABLET ORAL
Qty: 30 TABLET | Refills: 1 | Status: SHIPPED | OUTPATIENT
Start: 2023-04-03

## 2023-04-03 NOTE — TELEPHONE ENCOUNTER
Future Appointments    Encounter Information    Provider Department Appt Notes   8/25/2023 Sarah Osborn MD [facility] 1 yr f/u //sxc     Past Visits    Date Provider Specialty Visit Type Primary Dx   02/02/2023 Yesica Mayen, APRN - CNM Obstetrics and Gynecology Office Visit Dysmenorrhea   11/18/2022 [facility] Visit Acute streptococcal pharyngitis   10/14/2022 Efrem Belle DO Obstetrics and Gynecology Office Visit Screening for STD (sexually transmitted disease)   08/30/2022 Genia Wharton PSYD Behavioral Health Office Visit Prolonged grief disorder   08/19/2022 Sarah Osborn MD Family Medicine Office Visit Depression, unspecified depression type

## 2023-04-29 ENCOUNTER — PATIENT MESSAGE (OUTPATIENT)
Dept: BEHAVIORAL/MENTAL HEALTH CLINIC | Age: 28
End: 2023-04-29

## 2023-05-04 NOTE — TELEPHONE ENCOUNTER
From: Michael Bernardo  To: Britt Hilario PSYD  Sent: 4/29/2023 9:34 AM EDT  Subject: Referral    So I know my appointment was in August. But I was wondering if there was anyway I could be referred to talk to someone like a counselor of some sort or If Id need to come in again. Nessa Mccrary  thanks in advance

## 2023-05-19 ENCOUNTER — OFFICE VISIT (OUTPATIENT)
Dept: FAMILY MEDICINE CLINIC | Age: 28
End: 2023-05-19
Payer: COMMERCIAL

## 2023-05-19 VITALS
OXYGEN SATURATION: 98 % | RESPIRATION RATE: 16 BRPM | HEIGHT: 69 IN | HEART RATE: 88 BPM | BODY MASS INDEX: 31.25 KG/M2 | SYSTOLIC BLOOD PRESSURE: 120 MMHG | DIASTOLIC BLOOD PRESSURE: 80 MMHG | WEIGHT: 211 LBS

## 2023-05-19 DIAGNOSIS — L08.9 TOE INFECTION: Primary | ICD-10-CM

## 2023-05-19 PROCEDURE — G8417 CALC BMI ABV UP PARAM F/U: HCPCS | Performed by: NURSE PRACTITIONER

## 2023-05-19 PROCEDURE — G8427 DOCREV CUR MEDS BY ELIG CLIN: HCPCS | Performed by: NURSE PRACTITIONER

## 2023-05-19 PROCEDURE — 1036F TOBACCO NON-USER: CPT | Performed by: NURSE PRACTITIONER

## 2023-05-19 PROCEDURE — 99212 OFFICE O/P EST SF 10 MIN: CPT | Performed by: NURSE PRACTITIONER

## 2023-05-19 RX ORDER — CEPHALEXIN 500 MG/1
500 CAPSULE ORAL 2 TIMES DAILY
Qty: 14 CAPSULE | Refills: 0 | Status: SHIPPED | OUTPATIENT
Start: 2023-05-19 | End: 2023-05-26

## 2023-05-19 RX ORDER — CHLORHEXIDINE GLUCONATE 4 G/100ML
SOLUTION TOPICAL
Qty: 236 ML | Refills: 0 | Status: SHIPPED | OUTPATIENT
Start: 2023-05-19 | End: 2023-06-02

## 2023-05-19 SDOH — ECONOMIC STABILITY: FOOD INSECURITY: WITHIN THE PAST 12 MONTHS, YOU WORRIED THAT YOUR FOOD WOULD RUN OUT BEFORE YOU GOT MONEY TO BUY MORE.: NEVER TRUE

## 2023-05-19 SDOH — ECONOMIC STABILITY: FOOD INSECURITY: WITHIN THE PAST 12 MONTHS, THE FOOD YOU BOUGHT JUST DIDN'T LAST AND YOU DIDN'T HAVE MONEY TO GET MORE.: NEVER TRUE

## 2023-05-19 SDOH — ECONOMIC STABILITY: HOUSING INSECURITY
IN THE LAST 12 MONTHS, WAS THERE A TIME WHEN YOU DID NOT HAVE A STEADY PLACE TO SLEEP OR SLEPT IN A SHELTER (INCLUDING NOW)?: NO

## 2023-05-19 SDOH — ECONOMIC STABILITY: INCOME INSECURITY: HOW HARD IS IT FOR YOU TO PAY FOR THE VERY BASICS LIKE FOOD, HOUSING, MEDICAL CARE, AND HEATING?: NOT HARD AT ALL

## 2023-05-19 ASSESSMENT — PATIENT HEALTH QUESTIONNAIRE - PHQ9
9. THOUGHTS THAT YOU WOULD BE BETTER OFF DEAD, OR OF HURTING YOURSELF: 0
5. POOR APPETITE OR OVEREATING: 0
SUM OF ALL RESPONSES TO PHQ QUESTIONS 1-9: 0
8. MOVING OR SPEAKING SO SLOWLY THAT OTHER PEOPLE COULD HAVE NOTICED. OR THE OPPOSITE, BEING SO FIGETY OR RESTLESS THAT YOU HAVE BEEN MOVING AROUND A LOT MORE THAN USUAL: 0
4. FEELING TIRED OR HAVING LITTLE ENERGY: 0
SUM OF ALL RESPONSES TO PHQ QUESTIONS 1-9: 0
6. FEELING BAD ABOUT YOURSELF - OR THAT YOU ARE A FAILURE OR HAVE LET YOURSELF OR YOUR FAMILY DOWN: 0
7. TROUBLE CONCENTRATING ON THINGS, SUCH AS READING THE NEWSPAPER OR WATCHING TELEVISION: 0
10. IF YOU CHECKED OFF ANY PROBLEMS, HOW DIFFICULT HAVE THESE PROBLEMS MADE IT FOR YOU TO DO YOUR WORK, TAKE CARE OF THINGS AT HOME, OR GET ALONG WITH OTHER PEOPLE: 0
SUM OF ALL RESPONSES TO PHQ QUESTIONS 1-9: 0
SUM OF ALL RESPONSES TO PHQ QUESTIONS 1-9: 0
3. TROUBLE FALLING OR STAYING ASLEEP: 0
SUM OF ALL RESPONSES TO PHQ9 QUESTIONS 1 & 2: 0
2. FEELING DOWN, DEPRESSED OR HOPELESS: 0
1. LITTLE INTEREST OR PLEASURE IN DOING THINGS: 0

## 2023-06-09 ENCOUNTER — OFFICE VISIT (OUTPATIENT)
Dept: OBGYN CLINIC | Age: 28
End: 2023-06-09

## 2023-06-09 VITALS
SYSTOLIC BLOOD PRESSURE: 102 MMHG | HEIGHT: 66 IN | DIASTOLIC BLOOD PRESSURE: 70 MMHG | BODY MASS INDEX: 33.43 KG/M2 | WEIGHT: 208 LBS

## 2023-06-09 DIAGNOSIS — N89.8 VAGINAL DISCHARGE: ICD-10-CM

## 2023-06-09 DIAGNOSIS — Z20.2 POSSIBLE EXPOSURE TO STD: ICD-10-CM

## 2023-06-09 DIAGNOSIS — Z20.2 POSSIBLE EXPOSURE TO STD: Primary | ICD-10-CM

## 2023-06-09 ASSESSMENT — ENCOUNTER SYMPTOMS
ABDOMINAL PAIN: 0
SHORTNESS OF BREATH: 0
APNEA: 0

## 2023-06-09 NOTE — PROGRESS NOTES
Chaperone for Intimate Exam  Chaperone was offered and accepted as part of the rooming process.   Chaperone: matthias Sargent
fatigue and fever. Respiratory:  Negative for apnea and shortness of breath. Cardiovascular:  Negative for chest pain and palpitations. Gastrointestinal:  Negative for abdominal pain. Genitourinary:  Negative for difficulty urinating, dysuria, pelvic pain, vaginal bleeding and vaginal discharge. Neurological:  Negative for dizziness, weakness and light-headedness. Psychiatric/Behavioral:  Negative for agitation and dysphoric mood. Objective:   /70   Ht 5' 6\" (1.676 m)   Wt 208 lb (94.3 kg)   LMP 05/28/2023   BMI 33.57 kg/m²      Physical Exam  Constitutional:       Appearance: Normal appearance. She is well-developed. Eyes:      Pupils: Pupils are equal, round, and reactive to light. Cardiovascular:      Rate and Rhythm: Normal rate and regular rhythm. Heart sounds: Normal heart sounds. Pulmonary:      Effort: Pulmonary effort is normal.   Abdominal:      General: Bowel sounds are normal.      Palpations: Abdomen is soft. Genitourinary:     Labia:         Right: No rash, tenderness or lesion. Left: No rash, tenderness or lesion. Vagina: No erythema, tenderness, bleeding or lesions. Cervix: No discharge, friability, lesion or erythema. Uterus: Not enlarged. Adnexa:         Right: No mass, tenderness or fullness. Left: No mass, tenderness or fullness. Skin:     General: Skin is warm and dry. Neurological:      Mental Status: She is alert and oriented to person, place, and time. Psychiatric:         Mood and Affect: Mood normal.         Behavior: Behavior normal.       Assessment:       Diagnosis Orders   1. Possible exposure to STD        2. Vaginal discharge              Plan:      No orders of the defined types were placed in this encounter. No orders of the defined types were placed in this encounter. No follow-ups on file.      Kathryn Jimenez DO

## 2023-06-11 LAB
CLUE CELLS VAG QL WET PREP: ABNORMAL
T VAGINALIS VAG QL WET PREP: ABNORMAL
TRICHOMONAS VAGINALIS SCREEN: NEGATIVE
YEAST VAG QL WET PREP: ABNORMAL

## 2023-06-14 ENCOUNTER — PATIENT MESSAGE (OUTPATIENT)
Dept: OBGYN CLINIC | Age: 28
End: 2023-06-14

## 2023-06-14 LAB
C TRACH DNA CVX QL NAA+PROBE: NEGATIVE
N GONORRHOEA DNA CERV MUCUS QL NAA+PROBE: NEGATIVE

## 2023-06-19 RX ORDER — METRONIDAZOLE 500 MG/1
500 TABLET ORAL 2 TIMES DAILY
Qty: 14 TABLET | Refills: 0 | Status: SHIPPED | OUTPATIENT
Start: 2023-06-19 | End: 2023-06-26

## 2023-08-24 ENCOUNTER — OFFICE VISIT (OUTPATIENT)
Dept: FAMILY MEDICINE CLINIC | Age: 28
End: 2023-08-24
Payer: COMMERCIAL

## 2023-08-24 VITALS
DIASTOLIC BLOOD PRESSURE: 76 MMHG | BODY MASS INDEX: 33.75 KG/M2 | TEMPERATURE: 97.6 F | SYSTOLIC BLOOD PRESSURE: 120 MMHG | HEIGHT: 66 IN | HEART RATE: 74 BPM | WEIGHT: 210 LBS | OXYGEN SATURATION: 99 %

## 2023-08-24 DIAGNOSIS — Z11.3 SCREEN FOR STD (SEXUALLY TRANSMITTED DISEASE): ICD-10-CM

## 2023-08-24 DIAGNOSIS — R20.2 PARESTHESIA OF ARM: ICD-10-CM

## 2023-08-24 DIAGNOSIS — M62.830 SPASM OF BACK MUSCLES: Primary | ICD-10-CM

## 2023-08-24 PROCEDURE — G8427 DOCREV CUR MEDS BY ELIG CLIN: HCPCS | Performed by: PHYSICIAN ASSISTANT

## 2023-08-24 PROCEDURE — G8417 CALC BMI ABV UP PARAM F/U: HCPCS | Performed by: PHYSICIAN ASSISTANT

## 2023-08-24 PROCEDURE — 1036F TOBACCO NON-USER: CPT | Performed by: PHYSICIAN ASSISTANT

## 2023-08-24 PROCEDURE — 99213 OFFICE O/P EST LOW 20 MIN: CPT | Performed by: PHYSICIAN ASSISTANT

## 2023-08-24 RX ORDER — CYCLOBENZAPRINE HCL 5 MG
TABLET ORAL
Qty: 30 TABLET | Refills: 1 | Status: SHIPPED | OUTPATIENT
Start: 2023-08-24

## 2023-08-24 RX ORDER — IBUPROFEN 800 MG/1
800 TABLET ORAL 2 TIMES DAILY PRN
Qty: 60 TABLET | Refills: 2 | Status: SHIPPED | OUTPATIENT
Start: 2023-08-24

## 2023-08-24 ASSESSMENT — ENCOUNTER SYMPTOMS: BACK PAIN: 1

## 2023-08-24 NOTE — PROGRESS NOTES
Subjective  Chantel Jose G, 24 y.o. female presents today with:  Chief Complaint   Patient presents with    1 Year Follow Up     Patient presents today for a yearly check up. Patient states her right arm keeps on going numb x 1 month. HPI  Patient is here being seen acutely for fcomplaint of numbness of bilat ues usually on awakening radiating to her hands - most prominent right index and pinky fingers  She works at [employer] in the [employer] department - work is repetitive  Denies trigger  Requesting std screen denies pelvic pain vaginal d.c dysuria  No other concerns    Review of Systems   Musculoskeletal:  Positive for back pain, myalgias, neck pain and neck stiffness. All other systems reviewed and are negative.       Past Medical History:   Diagnosis Date    Class 1 obesity due to excess calories without serious comorbidity with body mass index (BMI) of 34.0 to 34.9 in adult     Current episode of major depressive disorder without prior episode      Past Surgical History:   Procedure Laterality Date    TONSILLECTOMY       Social History     Socioeconomic History    Marital status: Single     Spouse name: Not on file    Number of children: Not on file    Years of education: Not on file    Highest education level: Not on file   Occupational History    Not on file   Tobacco Use    Smoking status: Never    Smokeless tobacco: Never   Vaping Use    Vaping Use: Never used   Substance and Sexual Activity    Alcohol use: Yes    Drug use: Never    Sexual activity: Not Currently     Birth control/protection: Injection   Other Topics Concern    Not on file   Social History Narrative    Has 1 child 2017 Liya- Purnima     Born in [place] moved to [facility] stayed there for 8 years     Hobbies: sleep, likes to walk around      Social Determinants of Health     Financial Resource Strain: Low Risk     Difficulty of Paying Living Expenses: Not hard at all   Food Insecurity: No Food Insecurity    Worried About KERRIE Gimenez

## 2023-08-26 LAB — BACTERIA UR CULT: NORMAL

## 2023-08-28 LAB
SPECIMEN SOURCE: NORMAL
T VAGINALIS RRNA SPEC QL NAA+PROBE: NEGATIVE

## 2023-08-29 LAB
C TRACH DNA UR QL NAA+PROBE: NEGATIVE
N GONORRHOEA DNA UR QL NAA+PROBE: NEGATIVE

## 2023-09-17 ENCOUNTER — HOSPITAL ENCOUNTER (EMERGENCY)
Age: 28
Discharge: LEFT BEFORE DISCHARGE | End: 2023-09-17

## 2023-09-17 VITALS
BODY MASS INDEX: 33.73 KG/M2 | TEMPERATURE: 98.4 F | DIASTOLIC BLOOD PRESSURE: 70 MMHG | SYSTOLIC BLOOD PRESSURE: 121 MMHG | OXYGEN SATURATION: 99 % | HEART RATE: 97 BPM | WEIGHT: 209 LBS | RESPIRATION RATE: 20 BRPM

## 2023-09-17 ASSESSMENT — PAIN DESCRIPTION - LOCATION: LOCATION: CHEST

## 2023-09-17 ASSESSMENT — PAIN SCALES - GENERAL: PAINLEVEL_OUTOF10: 8

## 2023-09-17 ASSESSMENT — PAIN DESCRIPTION - DESCRIPTORS: DESCRIPTORS: TIGHTNESS

## 2023-09-17 ASSESSMENT — PAIN - FUNCTIONAL ASSESSMENT: PAIN_FUNCTIONAL_ASSESSMENT: 0-10

## 2023-12-06 ENCOUNTER — APPOINTMENT (OUTPATIENT)
Dept: OBSTETRICS AND GYNECOLOGY | Facility: CLINIC | Age: 28
End: 2023-12-06
Payer: COMMERCIAL

## 2023-12-06 ENCOUNTER — INITIAL PRENATAL (OUTPATIENT)
Dept: OBSTETRICS AND GYNECOLOGY | Facility: CLINIC | Age: 28
End: 2023-12-06
Payer: COMMERCIAL

## 2023-12-06 VITALS — SYSTOLIC BLOOD PRESSURE: 106 MMHG | DIASTOLIC BLOOD PRESSURE: 72 MMHG | BODY MASS INDEX: 33.95 KG/M2 | WEIGHT: 213.5 LBS

## 2023-12-06 DIAGNOSIS — Z34.91 PRENATAL CARE IN FIRST TRIMESTER (HHS-HCC): Primary | ICD-10-CM

## 2023-12-06 PROBLEM — Z34.80 SUPERVISION OF OTHER NORMAL PREGNANCY, ANTEPARTUM (HHS-HCC): Status: ACTIVE | Noted: 2023-12-06

## 2023-12-06 PROBLEM — Z86.59 HISTORY OF DEPRESSION: Status: ACTIVE | Noted: 2023-12-06

## 2023-12-06 PROBLEM — O99.211 OBESITY AFFECTING PREGNANCY IN FIRST TRIMESTER (HHS-HCC): Status: ACTIVE | Noted: 2023-12-06

## 2023-12-06 LAB — PREGNANCY TEST URINE, POC: POSITIVE

## 2023-12-06 PROCEDURE — 99204 OFFICE O/P NEW MOD 45 MIN: CPT

## 2023-12-06 PROCEDURE — 87086 URINE CULTURE/COLONY COUNT: CPT

## 2023-12-06 PROCEDURE — 87800 DETECT AGNT MULT DNA DIREC: CPT

## 2023-12-06 PROCEDURE — H1000 PRENATAL CARE ATRISK ASSESSM: HCPCS

## 2023-12-06 PROCEDURE — 81025 URINE PREGNANCY TEST: CPT

## 2023-12-06 ASSESSMENT — ENCOUNTER SYMPTOMS
ALLERGIC/IMMUNOLOGIC NEGATIVE: 0
RESPIRATORY NEGATIVE: 0
CONSTITUTIONAL NEGATIVE: 0
ENDOCRINE NEGATIVE: 0
MUSCULOSKELETAL NEGATIVE: 0
HEMATOLOGIC/LYMPHATIC NEGATIVE: 0
CARDIOVASCULAR NEGATIVE: 0
EYES NEGATIVE: 0
GASTROINTESTINAL NEGATIVE: 0
PSYCHIATRIC NEGATIVE: 0
NEUROLOGICAL NEGATIVE: 0

## 2023-12-06 NOTE — PROGRESS NOTES
Ni Loera is a 28 y.o.  at Unknown with a working estimated date of delivery of Not found. who presents for an initial prenatal visit. This pregnancy is unplanned -- Was on birth control pills.     Patient's last menstrual period was 10/07/2023.     Patient currently experiencing: breast tenderness, fatigue    Bleeding or cramping since LMP: no  Taking prenatal vitamin: No -- Encouraged to begin.   Ultrasound completed this pregnancy: No -- Dating US ordered and patient advised to schedule given positive UPT while taking birth control pills.     OB History    Para Term  AB Living   2 1 1 0 0 1   SAB IAB Ectopic Multiple Live Births   0 0 0 0 1      # Outcome Date GA Lbr Sean/2nd Weight Sex Delivery Anes PTL Lv   2 Current            1 Term 17 40w3d  3544 g F Vag-Vacuum EPI N TORRI   Son born in [place].      Prior pregnancy complications:  Vacuum delivery; pushed for 3 hours, baby face-up per patient.    History of hypertension:  No  Preeclampsia Risk - ASA prophylaxis; pt does not meet criteria.   No past medical history on file.     Last pap: 3/18/2022, normal.   Hx Depression/Anxiety - Yes - Not currently on medication.  States that when she was 14 years old, her sister passed away.  She does not like to talk about it.  Also endorses PPD with first child, though did not take medication for this and had good support at home.     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     Routine PNC, patient appropriate for midwifery service. Oriented to practice, including available collaboration and consulting with physicians.   Discussed  routine OB labs, including serum STI/HIV, CBC, blood type and screen.    Pregravid BMI: 32.91  Expected Total Weight Gain: 5 kg (11 lb)-9 kg (19 lb)     Obesity in Pregnancy -- BMI >30; HgA1C to be ordered with new OB labs. Limiting weight gain to 11-20 lbs through healthy eating habits and exercise reviewed.     Education provided r/t nutrition, folic acid supplementation, dietary guidelines, exercise, smoking, alcohol, caffeine, and drug use.    Strong recommendation and support for Covid vaccine discussed. Patient aware of increased rate of hospitalization, intubation, and death with Covid in pregnancy - Demonstrated safety of vaccination during pregnancy with no associated increases in GDM, hypertensive disorders, miscarriage/ labor nor fetal anomalies reviewed. Patient aware vaccination is recommended by ACNM, ACOG, SMFM, and CDC - She declines vaccination.    Declines flu vaccine.     No current outpatient medications on file.     Genetic Testing - The patient was counselled regarding prenatal genetic testing options and was provided literature in the obstetric folder. First line screening options, including cfDNA and first trimester ultrasound for nuchal translucency, were discussed and offered.  Benefits, drawbacks, and limitations were explained respectively.  It was clearly stated that only diagnostic testing (amniocentesis) provides definitive information regarding a genetic diagnosis in the fetus. It was discussed with the patient that the false positive rates for NIPT is higher for women under 35 years of age. The patient was informed that the cost of NIPT ranges and may not be covered by insurance depending on patient's age and risk factors. Patient aware that gender testing is available at a fraction of the cost through [facility].  This patient has decided to pursue.     Warning s/s discussed and SAB precautions reviewed.  F/U in 4 weeks -- Review U/S, needs new OB  labs/NIPT.    Jacquelin Davis, APRN-CNM

## 2023-12-06 NOTE — LETTER
12/6/2023    To Whom It May Concern:    Ni Loera is being followed for her pregnancy at [facility].  Estimated Date of Delivery: 7/13/24    Sincerely,        DEWAYNE Smith-JHON  [facility]  [phone]

## 2023-12-07 ENCOUNTER — ANCILLARY PROCEDURE (OUTPATIENT)
Dept: RADIOLOGY | Facility: CLINIC | Age: 28
End: 2023-12-07
Payer: COMMERCIAL

## 2023-12-07 DIAGNOSIS — O99.211 OBESITY COMPLICATING PREGNANCY, FIRST TRIMESTER (HHS-HCC): ICD-10-CM

## 2023-12-07 DIAGNOSIS — Z34.91 PRENATAL CARE IN FIRST TRIMESTER (HHS-HCC): ICD-10-CM

## 2023-12-07 LAB
BACTERIA UR CULT: NORMAL
C TRACH RRNA SPEC QL NAA+PROBE: NEGATIVE
N GONORRHOEA DNA SPEC QL PROBE+SIG AMP: NEGATIVE

## 2023-12-07 PROCEDURE — 76817 TRANSVAGINAL US OBSTETRIC: CPT

## 2023-12-07 PROCEDURE — 76817 TRANSVAGINAL US OBSTETRIC: CPT | Performed by: OBSTETRICS & GYNECOLOGY

## 2023-12-07 PROCEDURE — 76801 OB US < 14 WKS SINGLE FETUS: CPT | Performed by: OBSTETRICS & GYNECOLOGY

## 2023-12-07 PROCEDURE — 76830 TRANSVAGINAL US NON-OB: CPT

## 2023-12-07 PROCEDURE — 76801 OB US < 14 WKS SINGLE FETUS: CPT

## 2023-12-26 DIAGNOSIS — Z34.91 FIRST TRIMESTER PREGNANCY (HHS-HCC): ICD-10-CM

## 2023-12-28 DIAGNOSIS — N94.6 DYSMENORRHEA: ICD-10-CM

## 2023-12-29 RX ORDER — NORETHINDRONE ACETATE AND ETHINYL ESTRADIOL 1MG-20(21)
1 KIT ORAL DAILY
Qty: 84 TABLET | Refills: 1 | Status: SHIPPED | OUTPATIENT
Start: 2023-12-29

## 2024-01-02 ENCOUNTER — LAB (OUTPATIENT)
Dept: LAB | Facility: LAB | Age: 29
End: 2024-01-02
Payer: COMMERCIAL

## 2024-01-02 DIAGNOSIS — Z34.91 PRENATAL CARE IN FIRST TRIMESTER (HHS-HCC): ICD-10-CM

## 2024-01-02 DIAGNOSIS — Z34.91 FIRST TRIMESTER PREGNANCY (HHS-HCC): ICD-10-CM

## 2024-01-02 LAB
ABO GROUP (TYPE) IN BLOOD: NORMAL
ANTIBODY SCREEN: NORMAL
ERYTHROCYTE [DISTWIDTH] IN BLOOD BY AUTOMATED COUNT: 12.8 % (ref 11.5–14.5)
EST. AVERAGE GLUCOSE BLD GHB EST-MCNC: 91 MG/DL
HBA1C MFR BLD: 4.8 %
HBV SURFACE AG SERPL QL IA: NONREACTIVE
HCT VFR BLD AUTO: 40.4 % (ref 36–46)
HCV AB SER QL: NONREACTIVE
HGB BLD-MCNC: 13.6 G/DL (ref 12–16)
HIV 1+2 AB+HIV1 P24 AG SERPL QL IA: NONREACTIVE
MCH RBC QN AUTO: 30.3 PG (ref 26–34)
MCHC RBC AUTO-ENTMCNC: 33.7 G/DL (ref 32–36)
MCV RBC AUTO: 90 FL (ref 80–100)
NRBC BLD-RTO: 0 /100 WBCS (ref 0–0)
PLATELET # BLD AUTO: 271 X10*3/UL (ref 150–450)
RBC # BLD AUTO: 4.49 X10*6/UL (ref 4–5.2)
REFLEX ADDED, ANEMIA PANEL: NORMAL
RH FACTOR (ANTIGEN D): NORMAL
RUBV IGG SERPL IA-ACNC: 0.4 IA
RUBV IGG SERPL QL IA: NEGATIVE
T PALLIDUM AB SER QL: NONREACTIVE
WBC # BLD AUTO: 11.5 X10*3/UL (ref 4.4–11.3)

## 2024-01-02 PROCEDURE — 86780 TREPONEMA PALLIDUM: CPT

## 2024-01-02 PROCEDURE — 85027 COMPLETE CBC AUTOMATED: CPT

## 2024-01-02 PROCEDURE — 86850 RBC ANTIBODY SCREEN: CPT

## 2024-01-02 PROCEDURE — 36415 COLL VENOUS BLD VENIPUNCTURE: CPT

## 2024-01-02 PROCEDURE — 86317 IMMUNOASSAY INFECTIOUS AGENT: CPT

## 2024-01-02 PROCEDURE — 87340 HEPATITIS B SURFACE AG IA: CPT

## 2024-01-02 PROCEDURE — 86901 BLOOD TYPING SEROLOGIC RH(D): CPT

## 2024-01-02 PROCEDURE — 86803 HEPATITIS C AB TEST: CPT

## 2024-01-02 PROCEDURE — 87389 HIV-1 AG W/HIV-1&-2 AB AG IA: CPT

## 2024-01-02 PROCEDURE — 86900 BLOOD TYPING SEROLOGIC ABO: CPT

## 2024-01-02 PROCEDURE — 83036 HEMOGLOBIN GLYCOSYLATED A1C: CPT

## 2024-01-04 ENCOUNTER — ROUTINE PRENATAL (OUTPATIENT)
Dept: OBSTETRICS AND GYNECOLOGY | Facility: CLINIC | Age: 29
End: 2024-01-04
Payer: COMMERCIAL

## 2024-01-04 VITALS — DIASTOLIC BLOOD PRESSURE: 78 MMHG | SYSTOLIC BLOOD PRESSURE: 120 MMHG | BODY MASS INDEX: 34.7 KG/M2 | WEIGHT: 218.25 LBS

## 2024-01-04 DIAGNOSIS — Z34.80 SUPERVISION OF OTHER NORMAL PREGNANCY, ANTEPARTUM (HHS-HCC): Primary | ICD-10-CM

## 2024-01-04 DIAGNOSIS — Z86.59 HISTORY OF DEPRESSION: ICD-10-CM

## 2024-01-04 PROCEDURE — 99213 OFFICE O/P EST LOW 20 MIN: CPT

## 2024-01-04 ASSESSMENT — ENCOUNTER SYMPTOMS
ENDOCRINE NEGATIVE: 0
EYES NEGATIVE: 0
GASTROINTESTINAL NEGATIVE: 0
RESPIRATORY NEGATIVE: 0
CARDIOVASCULAR NEGATIVE: 0
HEMATOLOGIC/LYMPHATIC NEGATIVE: 0
CONSTITUTIONAL NEGATIVE: 0
ALLERGIC/IMMUNOLOGIC NEGATIVE: 0
MUSCULOSKELETAL NEGATIVE: 0
NEUROLOGICAL NEGATIVE: 0
PSYCHIATRIC NEGATIVE: 0

## 2024-01-04 NOTE — LETTER
1/4/2024    To Whom It May Concern:    Ni Loera is being followed for her pregnancy at [facility].  Estimated Date of Delivery: 7/28/24. Please allow Ni to not lift more than 25 lbs at work. Please let us know if you have any questions.    Sincerely,        Jacquelin Davis, DEWAYNE-JHON  [facility]  [phone].

## 2024-01-04 NOTE — PROGRESS NOTES
Subjective   Ni Loera is a 28 y.o.  at 10w4d with a working estimated date of delivery of 2024, by Ultrasound who presents for a routine prenatal visit.    Patient reports overall feeling well; no concerns or OB complaints.  Feeling more accepting of the pregnancy; anxiety has decreased.   Reports still having fatigue and breast tenderness.   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     Objective   Visit Vitals  /78   Wt 99 kg (218 lb 4 oz)   LMP 10/07/2023   BMI 34.70 kg/m²   OB Status Pregnant   Smoking Status Never   BSA 2.16 m²     Vitals:    24 1342   Weight: 99 kg (218 lb 4 oz)      Pregravid Weight/BMI - 93.9 kg (207 lb) / 32.91  Expected Total Weight Gain - 5 kg (11 lb)-9 kg (19 lb)  TW.103 kg (11 lb 4 oz)  Fundal height and FHR per prenatal flowsheet.    Assessment/Plan   Reviewed new OB labs, NIPT result pending.   Rubella non-immune; for MMR PP.   Referral placed to psychiatry and information for scheduling with ROSARIO Recio provided.   NT US scheduled.   Warning signs and symptoms reviewed; patient has emergency answering service phone line number and is aware of when to call.   Remainder of plan per problem list as below.     Medical Problems       Problem List       Supervision of other normal pregnancy, antepartum    Overview Addendum 1/3/2024  8:02 AM by EDUARDO Smith     Declines Covid/flu vaccination.  Rubella non-immune.  Rh negative -- For Rhogam at 28 weeks.         Obesity affecting pregnancy in first trimester    History of depression    Overview Signed 2023  1:40 PM by EDUARDO Smith     No current medications.             F/U in 4 weeks.    EDUARDO Smith

## 2024-01-24 ENCOUNTER — HOSPITAL ENCOUNTER (OUTPATIENT)
Dept: RADIOLOGY | Facility: CLINIC | Age: 29
Discharge: HOME | End: 2024-01-24
Payer: COMMERCIAL

## 2024-01-24 DIAGNOSIS — Z34.90 ENCOUNTER FOR SUPERVISION OF NORMAL PREGNANCY, UNSPECIFIED, UNSPECIFIED TRIMESTER (HHS-HCC): ICD-10-CM

## 2024-01-24 PROCEDURE — 76813 OB US NUCHAL MEAS 1 GEST: CPT

## 2024-01-24 PROCEDURE — 76813 OB US NUCHAL MEAS 1 GEST: CPT | Performed by: OBSTETRICS & GYNECOLOGY

## 2024-02-01 ENCOUNTER — APPOINTMENT (OUTPATIENT)
Dept: OBSTETRICS AND GYNECOLOGY | Facility: CLINIC | Age: 29
End: 2024-02-01
Payer: COMMERCIAL

## 2024-02-08 ENCOUNTER — ROUTINE PRENATAL (OUTPATIENT)
Dept: OBSTETRICS AND GYNECOLOGY | Facility: CLINIC | Age: 29
End: 2024-02-08
Payer: COMMERCIAL

## 2024-02-08 VITALS
BODY MASS INDEX: 34.91 KG/M2 | WEIGHT: 219.58 LBS | SYSTOLIC BLOOD PRESSURE: 108 MMHG | DIASTOLIC BLOOD PRESSURE: 80 MMHG

## 2024-02-08 DIAGNOSIS — R30.0 DYSURIA: ICD-10-CM

## 2024-02-08 DIAGNOSIS — Z34.92 PRENATAL CARE IN SECOND TRIMESTER (HHS-HCC): Primary | ICD-10-CM

## 2024-02-08 PROCEDURE — 99213 OFFICE O/P EST LOW 20 MIN: CPT

## 2024-02-08 PROCEDURE — 87086 URINE CULTURE/COLONY COUNT: CPT

## 2024-02-08 NOTE — PROGRESS NOTES
"Subjective   Ni Loera is a 28 y.o.  at 15w4d with a working estimated date of delivery of 2024, by Ultrasound who presents for a routine prenatal visit.    Patient reports overall feeling well.  Has appointment with ROSARIO Recio scheduled.  States that her mood has been \"up and down,\" cries sometimes.  Wants to meet with Rhiannon before initiating any medication at this time.  No thoughts of self-harm or harming others.      C/o occasional \"tingling\" after urinating.  States that this happened two days ago, though has not happened since.  Denies any dysuria or urinary urgency or frequency.    It's a BOY!      Objective   Visit Vitals  /80   Wt 99.6 kg (219 lb 9.6 oz)   LMP 10/07/2023   BMI 34.92 kg/m²   OB Status Pregnant   Smoking Status Never   BSA 2.16 m²     Vitals:    24 1348   Weight: 99.6 kg (219 lb 9.6 oz)      Pregravid Weight/BMI - 93.9 kg (207 lb) / 32.91  Expected Total Weight Gain - 5 kg (11 lb)-9 kg (19 lb)  TW.715 kg (12 lb 9.6 oz)  Fundal height and FHR per prenatal flowsheet.    Assessment/Plan   Reviewed normal NIPT result.  Aware of plan for Rhogam at 28 weeks given Rh negative status.  Reviewed normal 13 week US.  Anatomy US scheduled.   Urine culture sent today for patient's described \"tingling\" feeling after voiding two days ago to rule out UTI.  Hydration encouraged.   Warning signs and symptoms reviewed; patient has emergency answering service phone line number and is aware of when to call.   Remainder of plan per problem list as below.     Medical Problems       Problem List       Supervision of other normal pregnancy, antepartum    Overview Addendum 1/3/2024  8:02 AM by EDUARDO Salgado     Declines Covid/flu vaccination.  Rubella non-immune.  Rh negative -- For Rhogam at 28 weeks.         Obesity affecting pregnancy in first trimester    History of depression    Overview Signed 2023  1:40 PM by EDUARDO Salgado     No current " medications.             F/U in 4 weeks; review anatomy US and review visit with ROSARIO Recio.    Jacquelin Davis, DEWAYNE-JHON

## 2024-02-09 LAB — BACTERIA UR CULT: NO GROWTH

## 2024-02-21 ENCOUNTER — OFFICE VISIT (OUTPATIENT)
Dept: BEHAVIORAL HEALTH | Facility: CLINIC | Age: 29
End: 2024-02-21
Payer: COMMERCIAL

## 2024-02-21 DIAGNOSIS — Z86.59 HISTORY OF DEPRESSION: ICD-10-CM

## 2024-02-21 PROCEDURE — 99215 OFFICE O/P EST HI 40 MIN: CPT | Performed by: CLINICAL NURSE SPECIALIST

## 2024-02-21 PROCEDURE — 99205 OFFICE O/P NEW HI 60 MIN: CPT | Performed by: CLINICAL NURSE SPECIALIST

## 2024-02-21 PROCEDURE — 1036F TOBACCO NON-USER: CPT | Performed by: CLINICAL NURSE SPECIALIST
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
## 2024-03-07 ENCOUNTER — ROUTINE PRENATAL (OUTPATIENT)
Dept: OBSTETRICS AND GYNECOLOGY | Facility: CLINIC | Age: 29
End: 2024-03-07
Payer: COMMERCIAL

## 2024-03-07 VITALS
SYSTOLIC BLOOD PRESSURE: 122 MMHG | DIASTOLIC BLOOD PRESSURE: 82 MMHG | WEIGHT: 222.13 LBS | BODY MASS INDEX: 35.32 KG/M2

## 2024-03-07 DIAGNOSIS — Z34.80 SUPERVISION OF OTHER NORMAL PREGNANCY, ANTEPARTUM (HHS-HCC): ICD-10-CM

## 2024-03-07 DIAGNOSIS — Z86.59 HISTORY OF DEPRESSION: ICD-10-CM

## 2024-03-07 DIAGNOSIS — Z34.92 PRENATAL CARE IN SECOND TRIMESTER (HHS-HCC): Primary | ICD-10-CM

## 2024-03-07 DIAGNOSIS — O99.211 OBESITY AFFECTING PREGNANCY IN FIRST TRIMESTER, UNSPECIFIED OBESITY TYPE (HHS-HCC): ICD-10-CM

## 2024-03-07 PROCEDURE — 99213 OFFICE O/P EST LOW 20 MIN: CPT

## 2024-03-07 NOTE — PROGRESS NOTES
Subjective   Ni Loera is a 28 y.o.  at 19w4d with a working estimated date of delivery of 2024, by Ultrasound who presents for a routine prenatal visit.    Patient reports overall feeling well; no concerns or OB complaints.  Had visit with Rhiannon Recio, patient reports that it went well.  No medications started at this time.  Plans to attend individual psychotherapy.   Feeling fetal movement.   Running out of prenatal vitamins at home, requesting Rx for more.     Objective   Visit Vitals  /82   Wt 101 kg (222 lb 2 oz)   LMP 10/07/2023   BMI 35.32 kg/m²   OB Status Pregnant   Smoking Status Never   BSA 2.18 m²     Vitals:    24 1340   Weight: 101 kg (222 lb 2 oz)      Pregravid Weight/BMI - 93.9 kg (207 lb) / 32.91  Expected Total Weight Gain - 5 kg (11 lb)-9 kg (19 lb)  TW.861 kg (15 lb 2 oz)  Fundal height and FHR per prenatal flowsheet.    Assessment/Plan   Anatomy US is scheduled.  Rx for prenatal vitamins sent at patient request.  Warning signs and symptoms reviewed; patient has emergency answering service phone line number and is aware of when to call.   Remainder of plan per problem list as below.     Medical Problems       Problem List       Supervision of other normal pregnancy, antepartum    Overview Addendum 2024  1:49 PM by EDUARDO Smith     Declines Covid/flu vaccination.  Rubella non-immune.  Rh negative -- For Rhogam at 28 weeks.  Normal rr NIPS.         Obesity affecting pregnancy in first trimester    History of depression    Overview Signed 2023  1:40 PM by EDUARDO Smith     No current medications.               F/U in 4 weeks; review anatomy US, order GTT/CBC.    EDUARDO Smith

## 2024-03-13 ENCOUNTER — HOSPITAL ENCOUNTER (OUTPATIENT)
Dept: RADIOLOGY | Facility: CLINIC | Age: 29
Discharge: HOME | End: 2024-03-13
Payer: COMMERCIAL

## 2024-03-13 ENCOUNTER — SOCIAL WORK (OUTPATIENT)
Dept: BEHAVIORAL HEALTH | Facility: CLINIC | Age: 29
End: 2024-03-13
Payer: COMMERCIAL

## 2024-03-13 DIAGNOSIS — Z86.59 HISTORY OF DEPRESSION: ICD-10-CM

## 2024-03-13 DIAGNOSIS — Z34.91 PRENATAL CARE IN FIRST TRIMESTER (HHS-HCC): ICD-10-CM

## 2024-03-13 PROCEDURE — 76811 OB US DETAILED SNGL FETUS: CPT | Performed by: STUDENT IN AN ORGANIZED HEALTH CARE EDUCATION/TRAINING PROGRAM

## 2024-03-13 PROCEDURE — 90791 PSYCH DIAGNOSTIC EVALUATION: CPT

## 2024-03-13 PROCEDURE — 76811 OB US DETAILED SNGL FETUS: CPT

## 2024-03-14 ENCOUNTER — APPOINTMENT (OUTPATIENT)
Dept: BEHAVIORAL HEALTH | Facility: CLINIC | Age: 29
End: 2024-03-14
Payer: COMMERCIAL

## 2024-03-18 NOTE — PROGRESS NOTES
Therapy Session - Initial Assessment    Session Type: Virtual    Session Time  Start: 1:00 pm  End: 1:55 pm     Reason for Visit / Chief Complaint: Hx of Depression    Accompanied by: Self  Guardian Status: Self  Caregiver Status: Is a caregiver    CHIEF COMPLAINT SUMMARY:   The patient is a 28-year-old female who presented with a history of depression. The patient is 20-weeks pregnant, and she said she has concerns about having post-partum depression since she experienced that after the birth of her first child. The patient said she tends to have depressive episodes every month or two, and it normally lasts 1-2 weeks. The patient also reported having anxiety and difficulties focusing. The patient said she had a younger sister who  when she was 10 years old, and her 6-year-old daughter often triggers memories of her sister. Until recently, she did not know how her sister . She denied taking any mental health medications, and she denied having any suicidal or homicidal ideations.      HISTORY OF PRESENT ILLNESS   Current Providers:    Psychiatrist: MARY Floyd    Precipitants/Stressors: Pregnancy, grief about the death of her younger sister, history of post-partum depression    Prior mental health/substance abuse treatment: Went to therapy when she was younger    Acute mental health symptoms:  Suicidal Ideation: Denied  Homicidal Ideation: Denied  Psychosis: Denied    Level of functioning impairment at work/home/school now: Mild    Substance abuse hx:  Tobacco, vaping: Denied  Alcohol: Denied  Illicit drugs: Denied    Significant medical hx: Denied    PSYCHOSOCIAL HISTORIES  Living Environment: Lives alone with her 6-year-old daughter in [place]. Patient is in a relationship with the father of her baby, and they have been together for two years. Her daughter's father lives in [place].  Social support network: Boyfriend, mother, brother, friends  Anglican/Spiritual orientation: No   Gender  Identity and sexual orientation: Female, heterosexual  Recent losses or deaths: Multiple losses since 2015     Education Hx:   Highest level of education: Went to [employer] to be an .   Hx of learning disability/IEP: Denied    Work Hx:  Are you currently working?  Full-time  Occupation:  [employer] in the [employer]    Other Childhood Experiences: The patient said she grew up in [place], [place]. She had 10 siblings, and she was closest with her brother. The patient said her parents split up when she was [age] years old. She has a good relationship with her step-father who has been in her life since the age of 5. The patient she did poorly in school because she became interested in boys, however she graduated on time. Her mother home-schooled her some. The patient said she was 14 years old when her younger sister . She said that after that, everyone in the household stopped communicating and no one talked about her death.       FAMILY HISTORY:  Maternal Family Psych History:  Mother: Depression, anxiety  Grandmother: Denied  Grandfather: Denied                                                                   Brother: Denied  Sister: Denied    Paternal Family Psych History:  Father: ADHD  Grandmother: Denied  Grandfather: Denied                                                                        Brother: Denied  Sister: Denied    Hx of Abuse/Trauma History:   Child abuse: Denied  Rape: Denied  Domestic Violence: Denied  Robbery: Denied  Near Fatal experience: Denied    Goals patient wants to work on while attending therapy:  1. To find a way to heal from her sister's death  2. To learn healthy ways to cope with depression    Biggest strengths: Independent, likes to figure out her issues on her own, is a strong person    Healthy coping strategies: Opens the windows, breathes, cleans the house.     What barriers do you see interfering with your ability to attend therapy: Denied    Mental Status  Exam:  General appearance & grooming: Appropriate   Motor activity:  Appropriate            Behavior: Cooperative            Adaptive Equipment: Denied  Speech: Appropriate, Clear      Mood:  Depressed/Flat    Affect: Mood Congruent, Appropriate             Thought process:  Appropriate, Logical   Thought content: Appropriate     Cognition: No impairment, Orientation: Alert & Oriented x 3  Insight:  Aware of problem    Eye contact: Average    Judgment: Judgment intact   Interview behavior: Appropriate   Hallucinations: None      Delusions: Absent      Impression/Diagnosis: History of Depression, Grief    Plan: The patient is agreeable with attending Solution-Focused Therapy for approximately 8-10 sessions. Will schedule a follow-up appointment in one week.      LYLE Solano, MEHREENW-S    **This visit was performed using real-time telehealth tools, including an audio/video connection between the patient and this provider. The patient provided consent for the individual telemedicine service and understands the limitations of the visit.

## 2024-03-22 ENCOUNTER — ROUTINE PRENATAL (OUTPATIENT)
Dept: OBSTETRICS AND GYNECOLOGY | Facility: CLINIC | Age: 29
End: 2024-03-22
Payer: COMMERCIAL

## 2024-03-22 VITALS
BODY MASS INDEX: 35.82 KG/M2 | WEIGHT: 225.25 LBS | DIASTOLIC BLOOD PRESSURE: 62 MMHG | SYSTOLIC BLOOD PRESSURE: 118 MMHG

## 2024-03-22 DIAGNOSIS — Z34.80 SUPERVISION OF OTHER NORMAL PREGNANCY, ANTEPARTUM (HHS-HCC): ICD-10-CM

## 2024-03-22 DIAGNOSIS — Z86.59 HISTORY OF DEPRESSION: ICD-10-CM

## 2024-03-22 DIAGNOSIS — O99.211 OBESITY AFFECTING PREGNANCY IN FIRST TRIMESTER, UNSPECIFIED OBESITY TYPE (HHS-HCC): ICD-10-CM

## 2024-03-22 DIAGNOSIS — Z3A.21 21 WEEKS GESTATION OF PREGNANCY (HHS-HCC): ICD-10-CM

## 2024-03-22 DIAGNOSIS — R12 HEART BURN: Primary | ICD-10-CM

## 2024-03-22 PROCEDURE — 99214 OFFICE O/P EST MOD 30 MIN: CPT

## 2024-03-22 RX ORDER — FAMOTIDINE 20 MG/1
20 TABLET, FILM COATED ORAL 2 TIMES DAILY
Qty: 180 TABLET | Refills: 1 | Status: SHIPPED | OUTPATIENT
Start: 2024-03-22 | End: 2025-03-22

## 2024-03-22 NOTE — PROGRESS NOTES
Subjective     Ni Loera is a 28 y.o.  at 21w5d with a working estimated date of delivery of 2024, by Ultrasound who presents for an ADD ON prenatal visit for concerns of upper abdominal/substernal pain that started at 0430. Patient reports sudden onset of discomfort. Patient describes pain as a pressure sensation and rates it a 3/10 on the pain scale.  Patient reports  eating hot chips last evening with jalepeno cheddar dip. Patient denies any n/v.      She denies vaginal bleeding, leakage of fluid, or contractions. Patient reports feeling fetal movement and compliance with PNV.   Current Outpatient Medications on File Prior to Visit   Medication Sig Dispense Refill    prenatal vitamin, iron-folic, 27 mg iron-800 mcg folic acid tablet Take 1 tablet by mouth once daily. 90 tablet 3     No current facility-administered medications on file prior to visit.        Her pregnancy is complicated by:  Medical Problems       Problem List       Supervision of other normal pregnancy, antepartum    Overview Addendum 3/14/2024  7:53 AM by EDUARDO Salgado     Declines Covid/flu vaccination.  Rubella non-immune.  Rh negative -- For Rhogam at 28 weeks.  Normal rr NIPS.  Pre-preg BMI 32.         Obesity affecting pregnancy in first trimester    History of depression    Overview Addendum 3/7/2024  2:11 PM by EDUARDO Salgado     No current medications.  Parviz Recio -- Plans for individual psychotherapy.                Objective   Weight: 102 kg (225 lb 4 oz)  Expected Total Weight Gain: 5 kg (11 lb)-9 kg (19 lb)   TW.278 kg (18 lb 4 oz)  Pregravid BMI: 32.91      BP: 118/62          Prenatal Labs:   Lab Results   Component Value Date    HGB 13.6 2024    HCT 40.4 2024     2024    ABO O 2024    LABRH NEG 2024    NEISSGONOAMP Negative 2023    CHLAMTRACAMP Negative 2023    SYPHT Nonreactive 2024    HEPBSAG Nonreactive 2024     HIV1X2 Nonreactive 01/02/2024    URINECULTURE No growth 02/08/2024       Assessment/Plan   Refer to last visit note for pregnancy plan of care  Exam suggestive for GERD, prescription for Pepcid sent to pharmacy.  Patient instructed that if symptoms do not improve she should reach back out to her OB provider.  Patient to follow-up as scheduled.    DEWAYNE Caballero-JHON

## 2024-03-27 ENCOUNTER — SOCIAL WORK (OUTPATIENT)
Dept: BEHAVIORAL HEALTH | Facility: CLINIC | Age: 29
End: 2024-03-27
Payer: COMMERCIAL

## 2024-03-27 DIAGNOSIS — Z86.59 HISTORY OF DEPRESSION: ICD-10-CM

## 2024-03-27 PROCEDURE — 90837 PSYTX W PT 60 MINUTES: CPT
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
## 2024-03-27 NOTE — PROGRESS NOTES
"Therapy Session  Progress Note    Session Type: Virtual    Start Time: 10:00 am  End Time: 10:55 am    Appointment: Scheduled    Reason for Visit: Depression    Patient Symptoms/Interval History: Mood is mostly stable. Feels her mother is her \"backbone\" and she talks with her daily. Has good relationship with her step-father, has issues with her biological father, lack of communication. Feels like she should \"get over it\" and not feel hurt. Has feelings of jealousy towards her half sister and the relationship she has with their father.    Mental Status: Alert & Oriented x3    Functional Status: No impairment    Interventions Provided: CBT, Values Exploration    Progress Made: Moderate    Response to Interventions: The patient was receptive to the interventions provided.    Action Plan/Homework: The patient will talk with her father and will ask him to respond to her text messages.    Treatment Plan: Use CBT to help the patient gain awareness of unhelpful thought patterns and core beliefs. Help the patient learn healthy coping skills.     Follow Up/Next Appointment: Follow up in 2 weeks.      LYLE Solano, SANDY-S    **This visit was performed using real-time telehealth tools, including an audio/video connection between the patient and this provider. The patient provided consent for the individual telemedicine service and understands the limitations of the visit.  "

## 2024-04-04 ENCOUNTER — ROUTINE PRENATAL (OUTPATIENT)
Dept: OBSTETRICS AND GYNECOLOGY | Facility: CLINIC | Age: 29
End: 2024-04-04
Payer: COMMERCIAL

## 2024-04-04 VITALS — BODY MASS INDEX: 36.01 KG/M2 | DIASTOLIC BLOOD PRESSURE: 72 MMHG | WEIGHT: 226.5 LBS | SYSTOLIC BLOOD PRESSURE: 124 MMHG

## 2024-04-04 DIAGNOSIS — Z13.1 SCREENING FOR DIABETES MELLITUS (DM): ICD-10-CM

## 2024-04-04 DIAGNOSIS — Z34.92 PRENATAL CARE IN SECOND TRIMESTER (HHS-HCC): Primary | ICD-10-CM

## 2024-04-04 PROCEDURE — 99213 OFFICE O/P EST LOW 20 MIN: CPT

## 2024-04-04 ASSESSMENT — EDINBURGH POSTNATAL DEPRESSION SCALE (EPDS)
THE THOUGHT OF HARMING MYSELF HAS OCCURRED TO ME: NEVER
I HAVE BEEN ANXIOUS OR WORRIED FOR NO GOOD REASON: YES, VERY OFTEN
I HAVE BLAMED MYSELF UNNECESSARILY WHEN THINGS WENT WRONG: YES, SOME OF THE TIME
I HAVE BEEN SO UNHAPPY THAT I HAVE BEEN CRYING: NO, NEVER
I HAVE LOOKED FORWARD WITH ENJOYMENT TO THINGS: AS MUCH AS I EVER DID
I HAVE BEEN ABLE TO LAUGH AND SEE THE FUNNY SIDE OF THINGS: AS MUCH AS I ALWAYS COULD
TOTAL SCORE: 6
I HAVE BEEN SO UNHAPPY THAT I HAVE HAD DIFFICULTY SLEEPING: NOT AT ALL
THINGS HAVE BEEN GETTING ON TOP OF ME: NO, MOST OF THE TIME I HAVE COPED QUITE WELL
I HAVE FELT SCARED OR PANICKY FOR NO GOOD REASON: NO, NOT AT ALL
I HAVE FELT SAD OR MISERABLE: NO, NOT AT ALL

## 2024-05-01 ENCOUNTER — SOCIAL WORK (OUTPATIENT)
Dept: BEHAVIORAL HEALTH | Facility: CLINIC | Age: 29
End: 2024-05-01
Payer: COMMERCIAL

## 2024-05-01 DIAGNOSIS — Z86.59 HISTORY OF DEPRESSION: ICD-10-CM

## 2024-05-01 PROCEDURE — 90837 PSYTX W PT 60 MINUTES: CPT

## 2024-05-01 NOTE — PSYCHOTHERAPY
"Pregnancy - 27 weeks, having a boy  Feels ready, dtr is excited  FOB will help out, he will work and will help    FOB - talk about moving in, feels like she wants to know him better  He is picky about how she does things - cleaning, dishes done certain way  Still working full-time    Bigest stressor is work - working around a bunch of women,   Sat down with her dad - he is trying to answer her messages more, he came over, they   Told him - \"I'm still your child\", should be able to call you and answer. He listened, apologized  Difficulty delivering message in her brain    Youngest sibling is 7  He wasn't really there growing up, feels like she's missing that part  Stopped coming over - feels jealous  He was 15, mom was 18 when she was born    She used to go to grandma's house every weekend. Dad was there sometimes  He was in and out. Very close w/ grandma,  more mow, different dynamic  Moved to [place], lost touch with family in [place]  Before moved she was homeschool, put in school in [place]    In the house  prior to moving to [place]  Was boy-crazy, mom   Was talking w/ boys, ages 14, 15 - talking with teens, older ones, not physical, online    Hanging out w/ bad influences, going with the crowd - not that kind of person  Very shy  Mom recognized not good influences, pulled her out of school    Moved to [place] - kris's grandma was sick so moved there. Emotions all over the place moving there  Wasn't allowed to have friends, had cousins  Homeschooled 3 years, put back in public school    Didn't have a choice - 10th grade. Made a best friend, still keeps in touch.   Met her nikkie year, asked for supplies.   Had different friends - faster girls and others more normal-paced  Would go to teen events, to prom together  She likes to travel, go out and dance    Girls more faster-[aced - hung out with them too  Mom tried to allow her to be more    Sister  when she was 13 or 14  [place] protective mom    With mom its harder " - mom raised her to be scared of her. Never close w/ mom, would never tell her  [place] to handle her emotions herself.  Mom shut down when sister   More comfortable talking to brother    Didn't know the real reason her sister  - had DM, pneumonia, kidneys failed  Wasn't aware sister that sick  When sister  - knew something was wrong, in after school program, someone else came to get her, told her sister in the hospital  Mom called, started screaming    Remember the screams, the way she was on the portch    Its hard to talk about, suppressed emotion  It altered a lot of her life.    Uncomfortable letting her emotions show    Emotions   Hormonal - temper  Things that trigger her temper - everything    Comfort - o to sleep, listen to music  Maternity leave 2 weeks prior to due date, off 16 weeks paid  Working in [employer]

## 2024-05-01 NOTE — PROGRESS NOTES
Therapy Session  Progress Note    Session Type: Virtual    Start Time: 10:00 am  End Time: 10:55 am    Appointment: Scheduled    Reason for Visit: Depression    Patient Symptoms/Interval History: Feeling tired and irritable, currently 27 weeks pregnant, expecting a boy. Having work stress, moved into the [place] area. Looking forward to maternity leave. Met with her father to discuss her desire to talk more and have him be more involved. Said that conversation has helped to improve their relationship. Has difficult allowing herself to feel her emotions and expressing them. Feels more comfortable shutting down her emotions. Has never processed her emotions about her 8-year-old sister's death.    Mental Status: Alert & Oriented x3    Functional Status: No impairment    Interventions Provided: Psychoeducation, Grief Work    Progress Made: Moderate    Response to Interventions: The patient was receptive to the interventions provided.    Action Plan/Homework: The patient will consider how she has coped by not expressing her emotions.    Treatment Plan: Provide grief counseling and CBT to help the patient gain insight into her thoughts and beliefs.    Follow Up/Next Appointment: Follow up in 2 weeks.      LYLE Solano, MEHREENW-S    **This visit was performed using real-time telehealth tools, including an audio/video connection between the patient and this provider. The patient provided consent for the individual telemedicine service and understands the limitations of the visit.

## 2024-05-09 ENCOUNTER — ROUTINE PRENATAL (OUTPATIENT)
Dept: OBSTETRICS AND GYNECOLOGY | Facility: CLINIC | Age: 29
End: 2024-05-09
Payer: COMMERCIAL

## 2024-05-09 ENCOUNTER — LAB (OUTPATIENT)
Dept: LAB | Facility: LAB | Age: 29
End: 2024-05-09
Payer: COMMERCIAL

## 2024-05-09 VITALS
SYSTOLIC BLOOD PRESSURE: 120 MMHG | BODY MASS INDEX: 36.79 KG/M2 | DIASTOLIC BLOOD PRESSURE: 68 MMHG | WEIGHT: 231.38 LBS

## 2024-05-09 DIAGNOSIS — Z3A.28 28 WEEKS GESTATION OF PREGNANCY (HHS-HCC): ICD-10-CM

## 2024-05-09 DIAGNOSIS — Z13.1 SCREENING FOR DIABETES MELLITUS (DM): ICD-10-CM

## 2024-05-09 DIAGNOSIS — Z29.13 ENCOUNTER FOR PROPHYLACTIC ADMINISTRATION OF RHOGAM: ICD-10-CM

## 2024-05-09 LAB
ABO GROUP (TYPE) IN BLOOD: NORMAL
ANTIBODY SCREEN: NORMAL
ERYTHROCYTE [DISTWIDTH] IN BLOOD BY AUTOMATED COUNT: 12.9 % (ref 11.5–14.5)
GLUCOSE 1H P 50 G GLC PO SERPL-MCNC: 125 MG/DL
HCT VFR BLD AUTO: 34.4 % (ref 36–46)
HGB BLD-MCNC: 11.1 G/DL (ref 12–16)
MCH RBC QN AUTO: 28.5 PG (ref 26–34)
MCHC RBC AUTO-ENTMCNC: 32.3 G/DL (ref 32–36)
MCV RBC AUTO: 88 FL (ref 80–100)
NRBC BLD-RTO: 0 /100 WBCS (ref 0–0)
PLATELET # BLD AUTO: 264 X10*3/UL (ref 150–450)
RBC # BLD AUTO: 3.89 X10*6/UL (ref 4–5.2)
REFLEX ADDED, ANEMIA PANEL: NORMAL
RH FACTOR (ANTIGEN D): NORMAL
WBC # BLD AUTO: 14.5 X10*3/UL (ref 4.4–11.3)

## 2024-05-09 PROCEDURE — 82947 ASSAY GLUCOSE BLOOD QUANT: CPT

## 2024-05-09 PROCEDURE — 86901 BLOOD TYPING SEROLOGIC RH(D): CPT

## 2024-05-09 PROCEDURE — 90471 IMMUNIZATION ADMIN: CPT

## 2024-05-09 PROCEDURE — 85027 COMPLETE CBC AUTOMATED: CPT

## 2024-05-09 PROCEDURE — 90715 TDAP VACCINE 7 YRS/> IM: CPT

## 2024-05-09 PROCEDURE — 96372 THER/PROPH/DIAG INJ SC/IM: CPT

## 2024-05-09 PROCEDURE — 99214 OFFICE O/P EST MOD 30 MIN: CPT

## 2024-05-09 PROCEDURE — 36415 COLL VENOUS BLD VENIPUNCTURE: CPT

## 2024-05-09 PROCEDURE — 86850 RBC ANTIBODY SCREEN: CPT

## 2024-05-09 PROCEDURE — 86900 BLOOD TYPING SEROLOGIC ABO: CPT

## 2024-05-09 RX ORDER — FOLIC ACID, .BETA.-CAROTENE, ASCORBIC ACID, CHOLECALCIFEROL, .ALPHA.-TOCOPHEROL ACETATE, DL-, THIAMINE MONONITRATE, RIBOFLAVIN, NIACINAMIDE, PYRIDOXINE HYDROCHLORIDE, CYANOCOBALAMIN, CALCIUM PANTOTHENATE, CALCIUM CARBONATE, FERROUS FUMARATE, AND ZINC OXIDE 1; 1000; 100; 400; 30; 3; 3; 15; 20; 12; 7; 200; 29; 20 MG/1; [IU]/1; MG/1; [IU]/1; [IU]/1; MG/1; MG/1; MG/1; MG/1; UG/1; MG/1; MG/1; MG/1; MG/1
1 TABLET, CHEWABLE ORAL DAILY
Qty: 90 EACH | Refills: 3 | Status: SHIPPED | OUTPATIENT
Start: 2024-05-09 | End: 2024-05-22 | Stop reason: SDUPTHER

## 2024-05-09 NOTE — PROGRESS NOTES
"Subjective   Ni Loera is a 28 y.o.  at 28w4d with a working estimated date of delivery of 2024, by Ultrasound who presents for a routine prenatal visit.    Patient reports overall feeling well; no concerns or OB complaints.  Thinks that she may be anemic -- \"I'm craving the smell of non-edible things, like laundry detergent.\"  Has NOT ingested the detergent.  Requests Rx for chewable PNV.  Breasts leaking, wants to collect the milk.    Objective   Visit Vitals  /68   Wt 105 kg (231 lb 6 oz)   LMP 10/07/2023   BMI 36.79 kg/m²   OB Status Pregnant   Smoking Status Never   BSA 2.22 m²     Vitals:    24 1406   Weight: 105 kg (231 lb 6 oz)      Pregravid Weight/BMI - 93.9 kg (207 lb) / 32.91  Expected Total Weight Gain - 5 kg (11 lb)-9 kg (19 lb)  TW.1 kg (24 lb 6 oz)  Fundal height and FHR per prenatal flowsheet.    Assessment/Plan   GTT, CBC, T&S, Tdap, and Rhogam today.  Discussed dangers of consuming non-edible items, ie laundry detergent.  Patient states that she will not do this.  Will await CBC result to determine if anemia present.  Avoid nipple stimulation that may cause  labor.    labor and warning signs and symptoms reviewed; patient has emergency answering service phone line number and is aware of when to call.   Remainder of plan per problem list as below.     Medical Problems       Problem List       Supervision of other normal pregnancy, antepartum ([facility])    Overview Addendum 3/14/2024  7:53 AM by DEWAYNE Salgado-JHON     Declines Covid/flu vaccination.  Rubella non-immune.  Rh negative -- For Rhogam at 28 weeks.  Normal rr NIPS.  Pre-preg BMI 32.         Obesity affecting pregnancy in first trimester ([facility])    History of depression    Overview Addendum 3/7/2024  2:11 PM by DEWAYNE Salgado-JHON     No current medications.  Parviz Recio -- Plans for individual psychotherapy.             F/U in 2 weeks.    Jacquelin Davis, " APRN-CNM

## 2024-05-16 ENCOUNTER — HOSPITAL ENCOUNTER (EMERGENCY)
Facility: HOSPITAL | Age: 29
Discharge: HOME | End: 2024-05-16
Attending: STUDENT IN AN ORGANIZED HEALTH CARE EDUCATION/TRAINING PROGRAM
Payer: MEDICARE

## 2024-05-16 ENCOUNTER — HOSPITAL ENCOUNTER (OUTPATIENT)
Facility: HOSPITAL | Age: 29
Discharge: HOME | End: 2024-05-16
Attending: STUDENT IN AN ORGANIZED HEALTH CARE EDUCATION/TRAINING PROGRAM | Admitting: STUDENT IN AN ORGANIZED HEALTH CARE EDUCATION/TRAINING PROGRAM
Payer: COMMERCIAL

## 2024-05-16 ENCOUNTER — HOSPITAL ENCOUNTER (OUTPATIENT)
Facility: HOSPITAL | Age: 29
End: 2024-05-16
Attending: STUDENT IN AN ORGANIZED HEALTH CARE EDUCATION/TRAINING PROGRAM | Admitting: STUDENT IN AN ORGANIZED HEALTH CARE EDUCATION/TRAINING PROGRAM
Payer: COMMERCIAL

## 2024-05-16 ENCOUNTER — APPOINTMENT (OUTPATIENT)
Dept: CARDIOLOGY | Facility: HOSPITAL | Age: 29
End: 2024-05-16
Payer: MEDICARE

## 2024-05-16 VITALS
TEMPERATURE: 97.9 F | RESPIRATION RATE: 18 BRPM | DIASTOLIC BLOOD PRESSURE: 86 MMHG | OXYGEN SATURATION: 98 % | HEART RATE: 101 BPM | HEIGHT: 66 IN | SYSTOLIC BLOOD PRESSURE: 136 MMHG | BODY MASS INDEX: 37.12 KG/M2 | WEIGHT: 231 LBS

## 2024-05-16 VITALS
OXYGEN SATURATION: 97 % | RESPIRATION RATE: 18 BRPM | HEART RATE: 84 BPM | TEMPERATURE: 98.1 F | DIASTOLIC BLOOD PRESSURE: 57 MMHG | SYSTOLIC BLOOD PRESSURE: 98 MMHG
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
## 2024-05-16 PROBLEM — O16.3 ELEVATED BLOOD PRESSURE AFFECTING PREGNANCY IN THIRD TRIMESTER, ANTEPARTUM (HHS-HCC): Status: ACTIVE | Noted: 2024-05-16

## 2024-05-16 LAB
ABO GROUP (TYPE) IN BLOOD: NORMAL
ALBUMIN SERPL BCP-MCNC: 3.6 G/DL (ref 3.4–5)
ALP SERPL-CCNC: 87 U/L (ref 33–110)
ALT SERPL W P-5'-P-CCNC: 16 U/L (ref 7–45)
ANION GAP SERPL CALC-SCNC: 15 MMOL/L (ref 10–20)
ANTIBODY SCREEN: NORMAL
APTT PPP: 28 SECONDS (ref 27–38)
AST SERPL W P-5'-P-CCNC: 17 U/L (ref 9–39)
BASOPHILS # BLD AUTO: 0.02 X10*3/UL (ref 0–0.1)
BASOPHILS NFR BLD AUTO: 0.1 %
BB ANTIBODY IDENTIFICATION: NORMAL
BILIRUB SERPL-MCNC: 0.2 MG/DL (ref 0–1.2)
BUN SERPL-MCNC: 8 MG/DL (ref 6–23)
CALCIUM SERPL-MCNC: 9.1 MG/DL (ref 8.6–10.3)
CASE #: NORMAL
CHLORIDE SERPL-SCNC: 104 MMOL/L (ref 98–107)
CO2 SERPL-SCNC: 21 MMOL/L (ref 21–32)
CREAT SERPL-MCNC: 0.37 MG/DL (ref 0.5–1.05)
CREAT UR-MCNC: 73.8 MG/DL (ref 20–320)
EGFRCR SERPLBLD CKD-EPI 2021: >90 ML/MIN/1.73M*2
EOSINOPHIL # BLD AUTO: 0.18 X10*3/UL (ref 0–0.7)
EOSINOPHIL NFR BLD AUTO: 1.3 %
ERYTHROCYTE [DISTWIDTH] IN BLOOD BY AUTOMATED COUNT: 12.9 % (ref 11.5–14.5)
GLUCOSE SERPL-MCNC: 90 MG/DL (ref 74–99)
HCT VFR BLD AUTO: 34.3 % (ref 36–46)
HGB BLD-MCNC: 11 G/DL (ref 12–16)
HOLD SPECIMEN: NORMAL
IMM GRANULOCYTES # BLD AUTO: 0.08 X10*3/UL (ref 0–0.7)
IMM GRANULOCYTES NFR BLD AUTO: 0.6 % (ref 0–0.9)
INR PPP: 1 (ref 0.9–1.1)
LYMPHOCYTES # BLD AUTO: 3.09 X10*3/UL (ref 1.2–4.8)
LYMPHOCYTES NFR BLD AUTO: 21.6 %
MCH RBC QN AUTO: 28 PG (ref 26–34)
MCHC RBC AUTO-ENTMCNC: 32.1 G/DL (ref 32–36)
MCV RBC AUTO: 87 FL (ref 80–100)
MONOCYTES # BLD AUTO: 1.04 X10*3/UL (ref 0.1–1)
MONOCYTES NFR BLD AUTO: 7.3 %
NEUTROPHILS # BLD AUTO: 9.88 X10*3/UL (ref 1.2–7.7)
NEUTROPHILS NFR BLD AUTO: 69.1 %
NRBC BLD-RTO: 0 /100 WBCS (ref 0–0)
PLATELET # BLD AUTO: 244 X10*3/UL (ref 150–450)
POTASSIUM SERPL-SCNC: 3.9 MMOL/L (ref 3.5–5.3)
PROT SERPL-MCNC: 6.5 G/DL (ref 6.4–8.2)
PROT UR-ACNC: 8 MG/DL (ref 5–24)
PROT/CREAT UR: 0.11 MG/MG CREAT (ref 0–0.17)
PROTHROMBIN TIME: 11.2 SECONDS (ref 9.8–12.8)
RBC # BLD AUTO: 3.93 X10*6/UL (ref 4–5.2)
RH FACTOR (ANTIGEN D): NORMAL
SODIUM SERPL-SCNC: 136 MMOL/L (ref 136–145)
WBC # BLD AUTO: 14.3 X10*3/UL (ref 4.4–11.3)

## 2024-05-16 PROCEDURE — 59025 FETAL NON-STRESS TEST: CPT | Performed by: STUDENT IN AN ORGANIZED HEALTH CARE EDUCATION/TRAINING PROGRAM

## 2024-05-16 PROCEDURE — 86901 BLOOD TYPING SEROLOGIC RH(D): CPT | Performed by: STUDENT IN AN ORGANIZED HEALTH CARE EDUCATION/TRAINING PROGRAM

## 2024-05-16 PROCEDURE — 99215 OFFICE O/P EST HI 40 MIN: CPT | Mod: 25

## 2024-05-16 PROCEDURE — 99214 OFFICE O/P EST MOD 30 MIN: CPT | Performed by: STUDENT IN AN ORGANIZED HEALTH CARE EDUCATION/TRAINING PROGRAM

## 2024-05-16 PROCEDURE — 36415 COLL VENOUS BLD VENIPUNCTURE: CPT | Performed by: STUDENT IN AN ORGANIZED HEALTH CARE EDUCATION/TRAINING PROGRAM

## 2024-05-16 PROCEDURE — 88184 FLOWCYTOMETRY/ TC 1 MARKER: CPT | Mod: TC,STJLAB | Performed by: STUDENT IN AN ORGANIZED HEALTH CARE EDUCATION/TRAINING PROGRAM

## 2024-05-16 PROCEDURE — 99285 EMERGENCY DEPT VISIT HI MDM: CPT | Performed by: STUDENT IN AN ORGANIZED HEALTH CARE EDUCATION/TRAINING PROGRAM

## 2024-05-16 PROCEDURE — 85610 PROTHROMBIN TIME: CPT | Performed by: STUDENT IN AN ORGANIZED HEALTH CARE EDUCATION/TRAINING PROGRAM

## 2024-05-16 PROCEDURE — 82570 ASSAY OF URINE CREATININE: CPT | Performed by: STUDENT IN AN ORGANIZED HEALTH CARE EDUCATION/TRAINING PROGRAM

## 2024-05-16 PROCEDURE — 80053 COMPREHEN METABOLIC PANEL: CPT | Performed by: STUDENT IN AN ORGANIZED HEALTH CARE EDUCATION/TRAINING PROGRAM

## 2024-05-16 PROCEDURE — 99283 EMERGENCY DEPT VISIT LOW MDM: CPT | Mod: 25

## 2024-05-16 PROCEDURE — 86870 RBC ANTIBODY IDENTIFICATION: CPT

## 2024-05-16 PROCEDURE — 93005 ELECTROCARDIOGRAM TRACING: CPT

## 2024-05-16 PROCEDURE — 85025 COMPLETE CBC W/AUTO DIFF WBC: CPT | Performed by: STUDENT IN AN ORGANIZED HEALTH CARE EDUCATION/TRAINING PROGRAM

## 2024-05-16 RX ORDER — NIFEDIPINE 10 MG/1
10 CAPSULE ORAL ONCE AS NEEDED
Status: DISCONTINUED | OUTPATIENT
Start: 2024-05-16 | End: 2024-05-16 | Stop reason: HOSPADM

## 2024-05-16 RX ORDER — ONDANSETRON HYDROCHLORIDE 2 MG/ML
4 INJECTION, SOLUTION INTRAVENOUS EVERY 6 HOURS PRN
Status: DISCONTINUED | OUTPATIENT
Start: 2024-05-16 | End: 2024-05-16 | Stop reason: HOSPADM

## 2024-05-16 RX ORDER — ONDANSETRON 4 MG/1
4 TABLET, FILM COATED ORAL EVERY 6 HOURS PRN
Status: DISCONTINUED | OUTPATIENT
Start: 2024-05-16 | End: 2024-05-16 | Stop reason: HOSPADM

## 2024-05-16 RX ORDER — LIDOCAINE HYDROCHLORIDE 10 MG/ML
0.5 INJECTION INFILTRATION; PERINEURAL ONCE AS NEEDED
Status: DISCONTINUED | OUTPATIENT
Start: 2024-05-16 | End: 2024-05-16 | Stop reason: HOSPADM

## 2024-05-16 RX ORDER — LABETALOL HYDROCHLORIDE 5 MG/ML
20 INJECTION, SOLUTION INTRAVENOUS ONCE AS NEEDED
Status: DISCONTINUED | OUTPATIENT
Start: 2024-05-16 | End: 2024-05-16 | Stop reason: HOSPADM

## 2024-05-16 RX ORDER — ACETAMINOPHEN 325 MG/1
975 TABLET ORAL ONCE
Status: COMPLETED | OUTPATIENT
Start: 2024-05-16 | End: 2024-05-16

## 2024-05-16 RX ORDER — HYDRALAZINE HYDROCHLORIDE 20 MG/ML
5 INJECTION INTRAMUSCULAR; INTRAVENOUS ONCE AS NEEDED
Status: DISCONTINUED | OUTPATIENT
Start: 2024-05-16 | End: 2024-05-16 | Stop reason: HOSPADM

## 2024-05-16 RX ADMIN — ACETAMINOPHEN 975 MG: 325 TABLET ORAL at 19:37

## 2024-05-16 ASSESSMENT — PAIN DESCRIPTION - PAIN TYPE: TYPE: ACUTE PAIN

## 2024-05-16 ASSESSMENT — PAIN - FUNCTIONAL ASSESSMENT
PAIN_FUNCTIONAL_ASSESSMENT: 0-10
PAIN_FUNCTIONAL_ASSESSMENT: 0-10

## 2024-05-16 ASSESSMENT — COLUMBIA-SUICIDE SEVERITY RATING SCALE - C-SSRS
1. IN THE PAST MONTH, HAVE YOU WISHED YOU WERE DEAD OR WISHED YOU COULD GO TO SLEEP AND NOT WAKE UP?: NO
2. HAVE YOU ACTUALLY HAD ANY THOUGHTS OF KILLING YOURSELF?: NO
6. HAVE YOU EVER DONE ANYTHING, STARTED TO DO ANYTHING, OR PREPARED TO DO ANYTHING TO END YOUR LIFE?: NO

## 2024-05-16 ASSESSMENT — PAIN SCALES - GENERAL
PAINLEVEL_OUTOF10: 0 - NO PAIN
PAINLEVEL_OUTOF10: 0 - NO PAIN
PAINLEVEL_OUTOF10: 5 - MODERATE PAIN
PAINLEVEL_OUTOF10: 0 - NO PAIN
PAINLEVEL_OUTOF10: 0 - NO PAIN
PAINLEVEL: 3

## 2024-05-16 ASSESSMENT — LIFESTYLE VARIABLES
HAVE PEOPLE ANNOYED YOU BY CRITICIZING YOUR DRINKING: NO
EVER HAD A DRINK FIRST THING IN THE MORNING TO STEADY YOUR NERVES TO GET RID OF A HANGOVER: NO
TOTAL SCORE: 0
HAVE YOU EVER FELT YOU SHOULD CUT DOWN ON YOUR DRINKING: NO
EVER FELT BAD OR GUILTY ABOUT YOUR DRINKING: NO

## 2024-05-16 ASSESSMENT — PAIN DESCRIPTION - ONSET: ONSET: ONGOING

## 2024-05-16 ASSESSMENT — PAIN DESCRIPTION - LOCATION: LOCATION: OTHER (COMMENT)

## 2024-05-16 ASSESSMENT — PAIN DESCRIPTION - PROGRESSION: CLINICAL_PROGRESSION: NOT CHANGED

## 2024-05-16 ASSESSMENT — PAIN DESCRIPTION - FREQUENCY: FREQUENCY: CONSTANT/CONTINUOUS

## 2024-05-16 ASSESSMENT — PAIN DESCRIPTION - DESCRIPTORS
DESCRIPTORS: ACHING
DESCRIPTORS: ACHING

## 2024-05-16 NOTE — NURSING NOTE
1930: Per MD Pineda patient okay to be q1h vital signs and continuous EFM until notified     1940: Patient given tylenol for headache 3/10
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
## 2024-05-16 NOTE — PROCEDURES
Ni HERNANDES Loera, a  at 29w4d with an JEFFREY of 2024, by Ultrasound, was seen at [facility] for a nonstress test.    Non-Stress Test   Baseline Fetal Heart Rate for Non-Stress Test: 155 BPM  Variability in Waveform for Non-Stress Test: Moderate  Accelerations in Non-Stress Test: Yes  Decelerations in Non-Stress Test: Variable (Isolated variable decel lasting 15 seconds with seferino in the 70s that was discontinuous from the remainder of the tracing)  Contractions in Non-Stress Test: Not present  Acoustic Stimulator for Non-Stress Test: No  Interpretation of Non-Stress Test   Interpretation of Non-Stress Test: Reactive      Vivian Mcmillan MD

## 2024-05-17 LAB
ATRIAL RATE: 91 BPM
FETAL RBC/RBC BLD FC-RTO: 0.01 %
P AXIS: 42 DEGREES
P OFFSET: 197 MS
P ONSET: 150 MS
PR INTERVAL: 136 MS
Q ONSET: 218 MS
QRS COUNT: 15 BEATS
QRS DURATION: 78 MS
QT INTERVAL: 342 MS
QTC CALCULATION(BAZETT): 420 MS
QTC FREDERICIA: 393 MS
R AXIS: 34 DEGREES
T AXIS: 17 DEGREES
T OFFSET: 389 MS
VENTRICULAR RATE: 91 BPM
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
## 2024-05-17 NOTE — NURSING NOTE
1945: Patient up to the bathroom     2030: Per MD Bowen patient okay to come off monitor and be discharged to home. Patient to be contacted regarding the potential need for rhogam tomorrow. Discharge education provided to patient and all questions answered.

## 2024-05-22 ENCOUNTER — ROUTINE PRENATAL (OUTPATIENT)
Dept: OBSTETRICS AND GYNECOLOGY | Facility: CLINIC | Age: 29
End: 2024-05-22
Payer: COMMERCIAL

## 2024-05-22 VITALS — SYSTOLIC BLOOD PRESSURE: 124 MMHG | WEIGHT: 231.5 LBS | BODY MASS INDEX: 37.37 KG/M2 | DIASTOLIC BLOOD PRESSURE: 58 MMHG

## 2024-05-22 DIAGNOSIS — Z3A.28 28 WEEKS GESTATION OF PREGNANCY (HHS-HCC): Primary | ICD-10-CM

## 2024-05-22 DIAGNOSIS — Z34.80 SUPERVISION OF OTHER NORMAL PREGNANCY, ANTEPARTUM (HHS-HCC): ICD-10-CM

## 2024-05-22 DIAGNOSIS — O16.3 ELEVATED BLOOD PRESSURE AFFECTING PREGNANCY IN THIRD TRIMESTER, ANTEPARTUM (HHS-HCC): ICD-10-CM

## 2024-05-22 DIAGNOSIS — O99.211 OBESITY AFFECTING PREGNANCY IN FIRST TRIMESTER, UNSPECIFIED OBESITY TYPE (HHS-HCC): ICD-10-CM

## 2024-05-22 DIAGNOSIS — Z86.59 HISTORY OF DEPRESSION: ICD-10-CM

## 2024-05-22 PROCEDURE — 99213 OFFICE O/P EST LOW 20 MIN: CPT | Performed by: ADVANCED PRACTICE MIDWIFE

## 2024-05-22 RX ORDER — FOLIC ACID, .BETA.-CAROTENE, ASCORBIC ACID, CHOLECALCIFEROL, .ALPHA.-TOCOPHEROL ACETATE, DL-, THIAMINE MONONITRATE, RIBOFLAVIN, NIACINAMIDE, PYRIDOXINE HYDROCHLORIDE, CYANOCOBALAMIN, CALCIUM PANTOTHENATE, CALCIUM CARBONATE, FERROUS FUMARATE, AND ZINC OXIDE 1; 1000; 100; 400; 30; 3; 3; 15; 20; 12; 7; 200; 29; 20 MG/1; [IU]/1; MG/1; [IU]/1; [IU]/1; MG/1; MG/1; MG/1; MG/1; UG/1; MG/1; MG/1; MG/1; MG/1
1 TABLET, CHEWABLE ORAL DAILY
Qty: 90 EACH | Refills: 3 | Status: SHIPPED | OUTPATIENT
Start: 2024-05-22
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
## 2024-06-05 ENCOUNTER — APPOINTMENT (OUTPATIENT)
Dept: BEHAVIORAL HEALTH | Facility: CLINIC | Age: 29
End: 2024-06-05
Payer: COMMERCIAL

## 2024-06-12 ENCOUNTER — APPOINTMENT (OUTPATIENT)
Dept: OBSTETRICS AND GYNECOLOGY | Facility: CLINIC | Age: 29
End: 2024-06-12
Payer: COMMERCIAL

## 2024-06-12 VITALS — BODY MASS INDEX: 37.77 KG/M2 | WEIGHT: 234 LBS | DIASTOLIC BLOOD PRESSURE: 60 MMHG | SYSTOLIC BLOOD PRESSURE: 118 MMHG

## 2024-06-12 DIAGNOSIS — Z3A.33 33 WEEKS GESTATION OF PREGNANCY (HHS-HCC): Primary | ICD-10-CM

## 2024-06-12 PROCEDURE — 99213 OFFICE O/P EST LOW 20 MIN: CPT
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
## 2024-06-15 LAB
ATRIAL RATE: 91 BPM
P AXIS: 42 DEGREES
P OFFSET: 197 MS
P ONSET: 150 MS
PR INTERVAL: 136 MS
Q ONSET: 218 MS
QRS COUNT: 15 BEATS
QRS DURATION: 78 MS
QT INTERVAL: 342 MS
QTC CALCULATION(BAZETT): 420 MS
QTC FREDERICIA: 393 MS
R AXIS: 34 DEGREES
T AXIS: 17 DEGREES
T OFFSET: 389 MS
VENTRICULAR RATE: 91 BPM

## 2024-06-26 ENCOUNTER — APPOINTMENT (OUTPATIENT)
Dept: OBSTETRICS AND GYNECOLOGY | Facility: CLINIC | Age: 29
End: 2024-06-26
Payer: COMMERCIAL

## 2024-06-26 VITALS — WEIGHT: 236 LBS | DIASTOLIC BLOOD PRESSURE: 73 MMHG | BODY MASS INDEX: 38.09 KG/M2 | SYSTOLIC BLOOD PRESSURE: 118 MMHG

## 2024-06-26 DIAGNOSIS — N89.8 VAGINAL ODOR: ICD-10-CM

## 2024-06-26 DIAGNOSIS — Z3A.35 35 WEEKS GESTATION OF PREGNANCY (HHS-HCC): Primary | ICD-10-CM

## 2024-06-26 PROCEDURE — 87205 SMEAR GRAM STAIN: CPT

## 2024-06-26 PROCEDURE — 87661 TRICHOMONAS VAGINALIS AMPLIF: CPT

## 2024-06-26 PROCEDURE — 99213 OFFICE O/P EST LOW 20 MIN: CPT

## 2024-06-26 PROCEDURE — 87491 CHLMYD TRACH DNA AMP PROBE: CPT

## 2024-06-26 PROCEDURE — 87591 N.GONORRHOEAE DNA AMP PROB: CPT
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
## 2024-06-27 LAB
C TRACH RRNA SPEC QL NAA+PROBE: NEGATIVE
CLUE CELLS VAG LPF-#/AREA: PRESENT /[LPF]
N GONORRHOEA DNA SPEC QL PROBE+SIG AMP: NEGATIVE
NUGENT SCORE: 7
T VAGINALIS RRNA SPEC QL NAA+PROBE: NEGATIVE
YEAST VAG WET PREP-#/AREA: ABNORMAL

## 2024-06-28 RX ORDER — METRONIDAZOLE 7.5 MG/G
GEL VAGINAL DAILY
Qty: 70 G | Refills: 0 | Status: SHIPPED | OUTPATIENT
Start: 2024-06-28 | End: 2024-07-03
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
## 2024-07-10 ENCOUNTER — APPOINTMENT (OUTPATIENT)
Dept: OBSTETRICS AND GYNECOLOGY | Facility: CLINIC | Age: 29
End: 2024-07-10
Payer: COMMERCIAL

## 2024-07-10 VITALS
BODY MASS INDEX: 38.31 KG/M2 | WEIGHT: 237.38 LBS | SYSTOLIC BLOOD PRESSURE: 112 MMHG | DIASTOLIC BLOOD PRESSURE: 75 MMHG

## 2024-07-10 DIAGNOSIS — Z3A.37 37 WEEKS GESTATION OF PREGNANCY (HHS-HCC): Primary | ICD-10-CM

## 2024-07-10 PROCEDURE — 87081 CULTURE SCREEN ONLY: CPT

## 2024-07-10 PROCEDURE — 99213 OFFICE O/P EST LOW 20 MIN: CPT

## 2024-07-13 LAB — GP B STREP GENITAL QL CULT: NORMAL

## 2024-07-15 ENCOUNTER — ROUTINE PRENATAL (OUTPATIENT)
Dept: OBSTETRICS AND GYNECOLOGY | Facility: CLINIC | Age: 29
End: 2024-07-15
Payer: COMMERCIAL

## 2024-07-15 VITALS — SYSTOLIC BLOOD PRESSURE: 124 MMHG | BODY MASS INDEX: 38.6 KG/M2 | DIASTOLIC BLOOD PRESSURE: 82 MMHG | WEIGHT: 239.13 LBS

## 2024-07-15 DIAGNOSIS — N76.0 BV (BACTERIAL VAGINOSIS): ICD-10-CM

## 2024-07-15 DIAGNOSIS — Z34.80 SUPERVISION OF OTHER NORMAL PREGNANCY, ANTEPARTUM (HHS-HCC): ICD-10-CM

## 2024-07-15 DIAGNOSIS — Z86.59 HISTORY OF DEPRESSION: ICD-10-CM

## 2024-07-15 DIAGNOSIS — O16.3 ELEVATED BLOOD PRESSURE AFFECTING PREGNANCY IN THIRD TRIMESTER, ANTEPARTUM (HHS-HCC): ICD-10-CM

## 2024-07-15 DIAGNOSIS — B96.89 BV (BACTERIAL VAGINOSIS): ICD-10-CM

## 2024-07-15 DIAGNOSIS — O99.211 OBESITY AFFECTING PREGNANCY IN FIRST TRIMESTER, UNSPECIFIED OBESITY TYPE (HHS-HCC): ICD-10-CM

## 2024-07-15 DIAGNOSIS — Z3A.38 38 WEEKS GESTATION OF PREGNANCY (HHS-HCC): Primary | ICD-10-CM

## 2024-07-15 PROCEDURE — 99213 OFFICE O/P EST LOW 20 MIN: CPT | Performed by: ADVANCED PRACTICE MIDWIFE

## 2024-07-15 RX ORDER — METRONIDAZOLE 500 MG/1
500 TABLET ORAL 2 TIMES DAILY
Qty: 14 TABLET | Refills: 0 | Status: SHIPPED | OUTPATIENT
Start: 2024-07-15 | End: 2024-07-22

## 2024-07-15 NOTE — PROGRESS NOTES
Return OB visit    S: Ni Loera is a 28 y.o.  at 38w1d with a working estimated date of delivery of 2024, by Ultrasound who presents for a routine prenatal visit. She denies vaginal bleeding, abdominal pain, leakage of fluid.     Concerns today: Patient C/O vaginal discharge and vaginal odor. She was treated last week for BV with vaginal inserts and she doesn't feel they worked. She would like to be checked for dilation today also.    YOLETTE FOFANA MA      O: See prenatal flow sheet  Wet mount + clue cells, neg hyphae, + whiff    A/P:  BV- send metronidazole  Reviewed warning signs and when to call midwife  Follow up for a routine prenatal visit

## 2024-07-17 ENCOUNTER — APPOINTMENT (OUTPATIENT)
Dept: OBSTETRICS AND GYNECOLOGY | Facility: CLINIC | Age: 29
End: 2024-07-17
Payer: COMMERCIAL

## 2024-07-22 ENCOUNTER — ROUTINE PRENATAL (OUTPATIENT)
Dept: OBSTETRICS AND GYNECOLOGY | Facility: CLINIC | Age: 29
End: 2024-07-22
Payer: COMMERCIAL

## 2024-07-22 VITALS
WEIGHT: 238.13 LBS | DIASTOLIC BLOOD PRESSURE: 79 MMHG | BODY MASS INDEX: 38.43 KG/M2 | SYSTOLIC BLOOD PRESSURE: 123 MMHG

## 2024-07-22 DIAGNOSIS — O16.3 ELEVATED BLOOD PRESSURE AFFECTING PREGNANCY IN THIRD TRIMESTER, ANTEPARTUM (HHS-HCC): ICD-10-CM

## 2024-07-22 DIAGNOSIS — Z86.59 HISTORY OF DEPRESSION: ICD-10-CM

## 2024-07-22 DIAGNOSIS — Z34.80 SUPERVISION OF OTHER NORMAL PREGNANCY, ANTEPARTUM (HHS-HCC): ICD-10-CM

## 2024-07-22 DIAGNOSIS — Z3A.39 39 WEEKS GESTATION OF PREGNANCY (HHS-HCC): Primary | ICD-10-CM

## 2024-07-22 DIAGNOSIS — O99.211 OBESITY AFFECTING PREGNANCY IN FIRST TRIMESTER, UNSPECIFIED OBESITY TYPE (HHS-HCC): ICD-10-CM

## 2024-07-22 PROCEDURE — 99213 OFFICE O/P EST LOW 20 MIN: CPT | Performed by: ADVANCED PRACTICE MIDWIFE

## 2024-07-22 NOTE — PROGRESS NOTES
Return OB visit    S: Ni Loera is a 28 y.o.  at 39w1d with a working estimated date of delivery of 2024, by Ultrasound who presents for a routine prenatal visit. She denies vaginal bleeding, abdominal pain, leakage of fluid.     Concerns today: Patient would like dilation check today. No questions or concerns.    YOLETTE FOFANA MA      O: See prenatal flow sheet    A/P:  FH>D, EFW ~8lbs  Discussed fetal estimated size and risks of shoulder dystocia at time of labor. Shoulder dystocia risks include fetal nerve damage, bone fracture, asphyxia and rarely death, maternal injury. Deferred growth ultrasound given limitations of estimation of fetal weight at term by ultrasound, also would not change this mother's decision making.  IOL discussed, pt prefers to avoid and declines scheduling, counseled IOL does not decrease incidence of shoulder dystocia but may decrease risk factor of fetal size at time of birth  Membrane sweep today  Reviewed warning signs and when to call midwife  Follow up in 1 weeks for a routine prenatal visit

## 2024-07-23 ENCOUNTER — APPOINTMENT (OUTPATIENT)
Dept: OBSTETRICS AND GYNECOLOGY | Facility: CLINIC | Age: 29
End: 2024-07-23
Payer: COMMERCIAL

## 2024-07-24 ENCOUNTER — APPOINTMENT (OUTPATIENT)
Dept: OBSTETRICS AND GYNECOLOGY | Facility: CLINIC | Age: 29
End: 2024-07-24
Payer: COMMERCIAL

## 2024-07-24 ENCOUNTER — HOSPITAL ENCOUNTER (INPATIENT)
Facility: HOSPITAL | Age: 29
LOS: 2 days | Discharge: HOME | End: 2024-07-26
Attending: OBSTETRICS & GYNECOLOGY | Admitting: ADVANCED PRACTICE MIDWIFE
Payer: COMMERCIAL

## 2024-07-24 ENCOUNTER — ROUTINE PRENATAL (OUTPATIENT)
Dept: OBSTETRICS AND GYNECOLOGY | Facility: CLINIC | Age: 29
End: 2024-07-24
Payer: COMMERCIAL

## 2024-07-24 ENCOUNTER — ANESTHESIA (OUTPATIENT)
Dept: OBSTETRICS AND GYNECOLOGY | Facility: HOSPITAL | Age: 29
End: 2024-07-24
Payer: COMMERCIAL

## 2024-07-24 ENCOUNTER — ANESTHESIA EVENT (OUTPATIENT)
Dept: OBSTETRICS AND GYNECOLOGY | Facility: HOSPITAL | Age: 29
End: 2024-07-24
Payer: COMMERCIAL

## 2024-07-24 VITALS — BODY MASS INDEX: 38.41 KG/M2 | DIASTOLIC BLOOD PRESSURE: 77 MMHG | SYSTOLIC BLOOD PRESSURE: 117 MMHG | WEIGHT: 238 LBS

## 2024-07-24 DIAGNOSIS — O13.9 GESTATIONAL HYPERTENSION, ANTEPARTUM (HHS-HCC): ICD-10-CM

## 2024-07-24 DIAGNOSIS — O36.8130 DECREASED FETAL MOVEMENTS IN THIRD TRIMESTER, SINGLE OR UNSPECIFIED FETUS (HHS-HCC): ICD-10-CM

## 2024-07-24 DIAGNOSIS — Z86.59 HISTORY OF DEPRESSION: ICD-10-CM

## 2024-07-24 DIAGNOSIS — Z34.80 SUPERVISION OF OTHER NORMAL PREGNANCY, ANTEPARTUM (HHS-HCC): ICD-10-CM

## 2024-07-24 DIAGNOSIS — K59.00 CONSTIPATION, UNSPECIFIED CONSTIPATION TYPE: ICD-10-CM

## 2024-07-24 DIAGNOSIS — Z3A.39 39 WEEKS GESTATION OF PREGNANCY (HHS-HCC): Primary | ICD-10-CM

## 2024-07-24 DIAGNOSIS — R52 POSTPARTUM PAIN (HHS-HCC): Primary | ICD-10-CM

## 2024-07-24 PROBLEM — K21.9 GASTROESOPHAGEAL REFLUX DISEASE: Status: ACTIVE | Noted: 2024-07-24

## 2024-07-24 PROBLEM — G56.03 BILATERAL CARPAL TUNNEL SYNDROME: Status: ACTIVE | Noted: 2024-07-24

## 2024-07-24 LAB
ABO GROUP (TYPE) IN BLOOD: NORMAL
ALBUMIN SERPL BCP-MCNC: 3.3 G/DL (ref 3.4–5)
ALP SERPL-CCNC: 147 U/L (ref 33–110)
ALT SERPL W P-5'-P-CCNC: 19 U/L (ref 7–45)
ANION GAP SERPL CALC-SCNC: 13 MMOL/L (ref 10–20)
ANTIBODY SCREEN: NORMAL
AST SERPL W P-5'-P-CCNC: 21 U/L (ref 9–39)
BILIRUB SERPL-MCNC: 0.3 MG/DL (ref 0–1.2)
BUN SERPL-MCNC: 9 MG/DL (ref 6–23)
CALCIUM SERPL-MCNC: 8.9 MG/DL (ref 8.6–10.3)
CHLORIDE SERPL-SCNC: 106 MMOL/L (ref 98–107)
CO2 SERPL-SCNC: 20 MMOL/L (ref 21–32)
CREAT SERPL-MCNC: 0.44 MG/DL (ref 0.5–1.05)
EGFRCR SERPLBLD CKD-EPI 2021: >90 ML/MIN/1.73M*2
ERYTHROCYTE [DISTWIDTH] IN BLOOD BY AUTOMATED COUNT: 14.3 % (ref 11.5–14.5)
GLUCOSE SERPL-MCNC: 99 MG/DL (ref 74–99)
HCT VFR BLD AUTO: 36.4 % (ref 36–46)
HGB BLD-MCNC: 11.3 G/DL (ref 12–16)
HOLD SPECIMEN: NORMAL
MCH RBC QN AUTO: 25.7 PG (ref 26–34)
MCHC RBC AUTO-ENTMCNC: 31 G/DL (ref 32–36)
MCV RBC AUTO: 83 FL (ref 80–100)
NRBC BLD-RTO: 0 /100 WBCS (ref 0–0)
PLATELET # BLD AUTO: 249 X10*3/UL (ref 150–450)
POTASSIUM SERPL-SCNC: 4.1 MMOL/L (ref 3.5–5.3)
PROT SERPL-MCNC: 6 G/DL (ref 6.4–8.2)
RBC # BLD AUTO: 4.4 X10*6/UL (ref 4–5.2)
RH FACTOR (ANTIGEN D): NORMAL
SODIUM SERPL-SCNC: 135 MMOL/L (ref 136–145)
TREPONEMA PALLIDUM IGG+IGM AB [PRESENCE] IN SERUM OR PLASMA BY IMMUNOASSAY: NONREACTIVE
WBC # BLD AUTO: 14.6 X10*3/UL (ref 4.4–11.3)

## 2024-07-24 PROCEDURE — 1220000001 HC OB SEMI-PRIVATE ROOM DAILY

## 2024-07-24 PROCEDURE — 80053 COMPREHEN METABOLIC PANEL: CPT | Performed by: ADVANCED PRACTICE MIDWIFE

## 2024-07-24 PROCEDURE — 2500000001 HC RX 250 WO HCPCS SELF ADMINISTERED DRUGS (ALT 637 FOR MEDICARE OP): Performed by: ADVANCED PRACTICE MIDWIFE

## 2024-07-24 PROCEDURE — 86780 TREPONEMA PALLIDUM: CPT | Mod: STJLAB | Performed by: ADVANCED PRACTICE MIDWIFE

## 2024-07-24 PROCEDURE — 2500000005 HC RX 250 GENERAL PHARMACY W/O HCPCS: Performed by: NURSE ANESTHETIST, CERTIFIED REGISTERED

## 2024-07-24 PROCEDURE — 7100000016 HC LABOR RECOVERY PER HOUR

## 2024-07-24 PROCEDURE — 36415 COLL VENOUS BLD VENIPUNCTURE: CPT | Performed by: ADVANCED PRACTICE MIDWIFE

## 2024-07-24 PROCEDURE — 51701 INSERT BLADDER CATHETER: CPT

## 2024-07-24 PROCEDURE — 86922 COMPATIBILITY TEST ANTIGLOB: CPT

## 2024-07-24 PROCEDURE — 99213 OFFICE O/P EST LOW 20 MIN: CPT

## 2024-07-24 PROCEDURE — 10907ZC DRAINAGE OF AMNIOTIC FLUID, THERAPEUTIC FROM PRODUCTS OF CONCEPTION, VIA NATURAL OR ARTIFICIAL OPENING: ICD-10-PCS | Performed by: ADVANCED PRACTICE MIDWIFE

## 2024-07-24 PROCEDURE — 85027 COMPLETE CBC AUTOMATED: CPT | Performed by: ADVANCED PRACTICE MIDWIFE

## 2024-07-24 PROCEDURE — 59409 OBSTETRICAL CARE: CPT | Performed by: ADVANCED PRACTICE MIDWIFE

## 2024-07-24 PROCEDURE — 59025 FETAL NON-STRESS TEST: CPT

## 2024-07-24 PROCEDURE — 85461 HEMOGLOBIN FETAL: CPT

## 2024-07-24 PROCEDURE — 3700000014 EPIDURAL BLOCK: Performed by: NURSE ANESTHETIST, CERTIFIED REGISTERED

## 2024-07-24 PROCEDURE — 2500000004 HC RX 250 GENERAL PHARMACY W/ HCPCS (ALT 636 FOR OP/ED): Performed by: NURSE ANESTHETIST, CERTIFIED REGISTERED

## 2024-07-24 PROCEDURE — 7210000002 HC LABOR PER HOUR

## 2024-07-24 PROCEDURE — 86901 BLOOD TYPING SEROLOGIC RH(D): CPT | Performed by: ADVANCED PRACTICE MIDWIFE

## 2024-07-24 PROCEDURE — 59050 FETAL MONITOR W/REPORT: CPT

## 2024-07-24 RX ORDER — NIFEDIPINE 10 MG/1
10 CAPSULE ORAL ONCE AS NEEDED
Status: DISCONTINUED | OUTPATIENT
Start: 2024-07-24 | End: 2024-07-26 | Stop reason: HOSPADM

## 2024-07-24 RX ORDER — SIMETHICONE 80 MG
80 TABLET,CHEWABLE ORAL 4 TIMES DAILY PRN
Status: DISCONTINUED | OUTPATIENT
Start: 2024-07-24 | End: 2024-07-26 | Stop reason: HOSPADM

## 2024-07-24 RX ORDER — TRANEXAMIC ACID 100 MG/ML
1000 INJECTION, SOLUTION INTRAVENOUS ONCE AS NEEDED
Status: DISCONTINUED | OUTPATIENT
Start: 2024-07-24 | End: 2024-07-24

## 2024-07-24 RX ORDER — DIPHENHYDRAMINE HCL 25 MG
25 TABLET ORAL EVERY 6 HOURS PRN
Status: DISCONTINUED | OUTPATIENT
Start: 2024-07-24 | End: 2024-07-26 | Stop reason: HOSPADM

## 2024-07-24 RX ORDER — HYDRALAZINE HYDROCHLORIDE 20 MG/ML
5 INJECTION INTRAMUSCULAR; INTRAVENOUS ONCE AS NEEDED
Status: DISCONTINUED | OUTPATIENT
Start: 2024-07-24 | End: 2024-07-26 | Stop reason: HOSPADM

## 2024-07-24 RX ORDER — FENTANYL/ROPIVACAINE/NS/PF 2MCG/ML-.2
PLASTIC BAG, INJECTION (ML) INJECTION
Status: COMPLETED
Start: 2024-07-24 | End: 2024-07-24

## 2024-07-24 RX ORDER — LOPERAMIDE HYDROCHLORIDE 2 MG/1
4 CAPSULE ORAL EVERY 2 HOUR PRN
Status: DISCONTINUED | OUTPATIENT
Start: 2024-07-24 | End: 2024-07-24

## 2024-07-24 RX ORDER — CARBOPROST TROMETHAMINE 250 UG/ML
250 INJECTION, SOLUTION INTRAMUSCULAR ONCE AS NEEDED
Status: DISCONTINUED | OUTPATIENT
Start: 2024-07-24 | End: 2024-07-24

## 2024-07-24 RX ORDER — LIDOCAINE 560 MG/1
1 PATCH PERCUTANEOUS; TOPICAL; TRANSDERMAL
Status: DISCONTINUED | OUTPATIENT
Start: 2024-07-24 | End: 2024-07-26 | Stop reason: HOSPADM

## 2024-07-24 RX ORDER — ONDANSETRON HYDROCHLORIDE 2 MG/ML
4 INJECTION, SOLUTION INTRAVENOUS EVERY 6 HOURS PRN
Status: DISCONTINUED | OUTPATIENT
Start: 2024-07-24 | End: 2024-07-24

## 2024-07-24 RX ORDER — LABETALOL HYDROCHLORIDE 5 MG/ML
20 INJECTION, SOLUTION INTRAVENOUS ONCE AS NEEDED
Status: DISCONTINUED | OUTPATIENT
Start: 2024-07-24 | End: 2024-07-26 | Stop reason: HOSPADM

## 2024-07-24 RX ORDER — ONDANSETRON 4 MG/1
4 TABLET, FILM COATED ORAL EVERY 6 HOURS PRN
Status: DISCONTINUED | OUTPATIENT
Start: 2024-07-24 | End: 2024-07-24

## 2024-07-24 RX ORDER — IBUPROFEN 600 MG/1
600 TABLET ORAL EVERY 6 HOURS SCHEDULED
Status: DISCONTINUED | OUTPATIENT
Start: 2024-07-24 | End: 2024-07-26 | Stop reason: HOSPADM

## 2024-07-24 RX ORDER — LIDOCAINE HYDROCHLORIDE 10 MG/ML
30 INJECTION INFILTRATION; PERINEURAL ONCE AS NEEDED
Status: DISCONTINUED | OUTPATIENT
Start: 2024-07-24 | End: 2024-07-24

## 2024-07-24 RX ORDER — NIFEDIPINE 10 MG/1
10 CAPSULE ORAL ONCE AS NEEDED
Status: DISCONTINUED | OUTPATIENT
Start: 2024-07-24 | End: 2024-07-24

## 2024-07-24 RX ORDER — LABETALOL HYDROCHLORIDE 5 MG/ML
20 INJECTION, SOLUTION INTRAVENOUS ONCE AS NEEDED
Status: DISCONTINUED | OUTPATIENT
Start: 2024-07-24 | End: 2024-07-24

## 2024-07-24 RX ORDER — FENTANYL/ROPIVACAINE/NS/PF 2MCG/ML-.2
0-25 PLASTIC BAG, INJECTION (ML) INJECTION CONTINUOUS
Status: DISCONTINUED | OUTPATIENT
Start: 2024-07-24 | End: 2024-07-24

## 2024-07-24 RX ORDER — MISOPROSTOL 200 UG/1
800 TABLET ORAL ONCE AS NEEDED
Status: DISCONTINUED | OUTPATIENT
Start: 2024-07-24 | End: 2024-07-26 | Stop reason: HOSPADM

## 2024-07-24 RX ORDER — BISACODYL 10 MG/1
10 SUPPOSITORY RECTAL DAILY PRN
Status: DISCONTINUED | OUTPATIENT
Start: 2024-07-24 | End: 2024-07-26 | Stop reason: HOSPADM

## 2024-07-24 RX ORDER — OXYTOCIN 10 [USP'U]/ML
10 INJECTION, SOLUTION INTRAMUSCULAR; INTRAVENOUS ONCE AS NEEDED
Status: DISCONTINUED | OUTPATIENT
Start: 2024-07-24 | End: 2024-07-26 | Stop reason: HOSPADM

## 2024-07-24 RX ORDER — OXYTOCIN 10 [USP'U]/ML
10 INJECTION, SOLUTION INTRAMUSCULAR; INTRAVENOUS ONCE AS NEEDED
Status: DISCONTINUED | OUTPATIENT
Start: 2024-07-24 | End: 2024-07-24

## 2024-07-24 RX ORDER — ENOXAPARIN SODIUM 100 MG/ML
40 INJECTION SUBCUTANEOUS EVERY 24 HOURS
Status: DISCONTINUED | OUTPATIENT
Start: 2024-07-25 | End: 2024-07-26 | Stop reason: HOSPADM

## 2024-07-24 RX ORDER — IBUPROFEN 600 MG/1
600 TABLET ORAL EVERY 6 HOURS SCHEDULED
Status: DISCONTINUED | OUTPATIENT
Start: 2024-07-24 | End: 2024-07-24

## 2024-07-24 RX ORDER — ONDANSETRON HYDROCHLORIDE 2 MG/ML
4 INJECTION, SOLUTION INTRAVENOUS EVERY 6 HOURS PRN
Status: DISCONTINUED | OUTPATIENT
Start: 2024-07-24 | End: 2024-07-26 | Stop reason: HOSPADM

## 2024-07-24 RX ORDER — MISOPROSTOL 200 UG/1
800 TABLET ORAL ONCE AS NEEDED
Status: DISCONTINUED | OUTPATIENT
Start: 2024-07-24 | End: 2024-07-24

## 2024-07-24 RX ORDER — SODIUM CHLORIDE, SODIUM LACTATE, POTASSIUM CHLORIDE, CALCIUM CHLORIDE 600; 310; 30; 20 MG/100ML; MG/100ML; MG/100ML; MG/100ML
125 INJECTION, SOLUTION INTRAVENOUS CONTINUOUS
Status: DISCONTINUED | OUTPATIENT
Start: 2024-07-24 | End: 2024-07-24

## 2024-07-24 RX ORDER — METHYLERGONOVINE MALEATE 0.2 MG/ML
0.2 INJECTION INTRAVENOUS ONCE AS NEEDED
Status: DISCONTINUED | OUTPATIENT
Start: 2024-07-24 | End: 2024-07-26 | Stop reason: HOSPADM

## 2024-07-24 RX ORDER — TRANEXAMIC ACID 100 MG/ML
1000 INJECTION, SOLUTION INTRAVENOUS ONCE AS NEEDED
Status: DISCONTINUED | OUTPATIENT
Start: 2024-07-24 | End: 2024-07-26 | Stop reason: HOSPADM

## 2024-07-24 RX ORDER — METHYLERGONOVINE MALEATE 0.2 MG/ML
0.2 INJECTION INTRAVENOUS ONCE AS NEEDED
Status: DISCONTINUED | OUTPATIENT
Start: 2024-07-24 | End: 2024-07-24

## 2024-07-24 RX ORDER — LIDOCAINE HYDROCHLORIDE 20 MG/ML
INJECTION, SOLUTION EPIDURAL; INFILTRATION; INTRACAUDAL; PERINEURAL AS NEEDED
Status: DISCONTINUED | OUTPATIENT
Start: 2024-07-24 | End: 2024-07-24

## 2024-07-24 RX ORDER — CARBOPROST TROMETHAMINE 250 UG/ML
250 INJECTION, SOLUTION INTRAMUSCULAR ONCE AS NEEDED
Status: DISCONTINUED | OUTPATIENT
Start: 2024-07-24 | End: 2024-07-26 | Stop reason: HOSPADM

## 2024-07-24 RX ORDER — OXYTOCIN/0.9 % SODIUM CHLORIDE 30/500 ML
60 PLASTIC BAG, INJECTION (ML) INTRAVENOUS
Status: DISCONTINUED | OUTPATIENT
Start: 2024-07-24 | End: 2024-07-24

## 2024-07-24 RX ORDER — ONDANSETRON 4 MG/1
4 TABLET, FILM COATED ORAL EVERY 6 HOURS PRN
Status: DISCONTINUED | OUTPATIENT
Start: 2024-07-24 | End: 2024-07-26 | Stop reason: HOSPADM

## 2024-07-24 RX ORDER — METOCLOPRAMIDE 10 MG/1
10 TABLET ORAL EVERY 6 HOURS PRN
Status: DISCONTINUED | OUTPATIENT
Start: 2024-07-24 | End: 2024-07-24

## 2024-07-24 RX ORDER — ADHESIVE BANDAGE
10 BANDAGE TOPICAL
Status: DISCONTINUED | OUTPATIENT
Start: 2024-07-24 | End: 2024-07-26 | Stop reason: HOSPADM

## 2024-07-24 RX ORDER — DIPHENHYDRAMINE HYDROCHLORIDE 50 MG/ML
25 INJECTION INTRAMUSCULAR; INTRAVENOUS EVERY 6 HOURS PRN
Status: DISCONTINUED | OUTPATIENT
Start: 2024-07-24 | End: 2024-07-26 | Stop reason: HOSPADM

## 2024-07-24 RX ORDER — POLYETHYLENE GLYCOL 3350 17 G/17G
17 POWDER, FOR SOLUTION ORAL 2 TIMES DAILY PRN
Status: DISCONTINUED | OUTPATIENT
Start: 2024-07-24 | End: 2024-07-26 | Stop reason: HOSPADM

## 2024-07-24 RX ORDER — ACETAMINOPHEN 325 MG/1
975 TABLET ORAL EVERY 6 HOURS SCHEDULED
Status: DISCONTINUED | OUTPATIENT
Start: 2024-07-24 | End: 2024-07-26 | Stop reason: HOSPADM

## 2024-07-24 RX ORDER — HYDRALAZINE HYDROCHLORIDE 20 MG/ML
5 INJECTION INTRAMUSCULAR; INTRAVENOUS ONCE AS NEEDED
Status: DISCONTINUED | OUTPATIENT
Start: 2024-07-24 | End: 2024-07-24

## 2024-07-24 RX ORDER — LOPERAMIDE HYDROCHLORIDE 2 MG/1
4 CAPSULE ORAL EVERY 2 HOUR PRN
Status: DISCONTINUED | OUTPATIENT
Start: 2024-07-24 | End: 2024-07-26 | Stop reason: HOSPADM

## 2024-07-24 RX ORDER — ACETAMINOPHEN 325 MG/1
975 TABLET ORAL EVERY 6 HOURS SCHEDULED
Status: DISCONTINUED | OUTPATIENT
Start: 2024-07-24 | End: 2024-07-24

## 2024-07-24 RX ORDER — METOCLOPRAMIDE HYDROCHLORIDE 5 MG/ML
10 INJECTION INTRAMUSCULAR; INTRAVENOUS EVERY 6 HOURS PRN
Status: DISCONTINUED | OUTPATIENT
Start: 2024-07-24 | End: 2024-07-24

## 2024-07-24 RX ORDER — TERBUTALINE SULFATE 1 MG/ML
0.25 INJECTION SUBCUTANEOUS ONCE AS NEEDED
Status: DISCONTINUED | OUTPATIENT
Start: 2024-07-24 | End: 2024-07-24

## 2024-07-24 SDOH — HEALTH STABILITY: MENTAL HEALTH: CURRENT SMOKER: 0

## 2024-07-24 ASSESSMENT — PAIN SCALES - GENERAL
PAINLEVEL_OUTOF10: 0 - NO PAIN

## 2024-07-24 NOTE — ANESTHESIA PROCEDURE NOTES
Epidural Block    Patient location during procedure: OB  Start time: 7/24/2024 11:14 AM  End time: 7/24/2024 11:30 AM  Reason for block: labor analgesia  Staffing  Performed: CRNA   Authorized by: GARY Cleary    Performed by: GARY Cleary    Preanesthetic Checklist  Completed: patient identified, IV checked, risks and benefits discussed, surgical consent, monitors and equipment checked, pre-op evaluation, timeout performed and sterile techniques followed  Block Timeout  RN/Licensed healthcare professional reads aloud to the Anesthesia provider and entire team: Patient identity, procedure with side and site, patient position, and as applicable the availability of implants/special equipment/special requirements.  Patient on coagulant treatment: no  Timeout performed at: 7/24/2024 11:16 AM  Block Placement  Patient position: sitting  Prep: DuraPrep  Sterility prep: hand, mask, cap, gloves and drape  Sedation level: no sedation  Patient monitoring: blood pressure, continuous pulse oximetry and heart rate  Approach: midline  Local numbing: lidocaine 1% to skin and subcutaneous tissues  Vertebral space: lumbar  Lumbar location: L3-L4  Epidural  Loss of resistance technique: saline  Guidance: landmark technique        Needle  Needle type: Tuohy   Needle gauge: 17  Needle length: 10.2 cm  Needle insertion depth: 7 cm  Catheter type: end hole  Catheter size: 19 G  Catheter at skin depth: 12 cm  Catheter securement method: clear occlusive dressing    Test dose: lidocaine 1.5% with epinephrine 1-to-200,000  Test dose: lidocaine 1.5% with epinephrine 1-to-200,000  Test dose result: no positive test dose    PCEA  Medication concentration used: 0.2% Ropivacaine with 2 mcg/mL Fentanyl  Dose (mL): 5  Lockout (minutes): 20  1-Hour Limit (boluses/hr): 3  Basal Rate: 8        Assessment  Block outcome: pain improved  Number of attempts: 1  Events: no positive test dose and negative  heme/CSF/paresthesia  Procedure assessment: patient tolerated procedure well with no immediate complications  Additional Notes  Labor pain 8/10 prior to epidural placement.  Epidural placed slightly right from midline.

## 2024-07-24 NOTE — PROCEDURES
Ni HERNANDES Loera, a  at 39w3d with an JEFFREY of 2024, by Ultrasound, was seen at [facility] for a nonstress test.    Non-Stress Test   Baseline Fetal Heart Rate for Non-Stress Test: 145 BPM  Variability in Waveform for Non-Stress Test: Moderate  Accelerations in Non-Stress Test: Yes  Decelerations in Non-Stress Test: (!) Variable, Late (one isolated late deceleration with variable component. Overall reassuring with good variability and accelerations. Paitent instructed to present to  L and D)  Contractions in Non-Stress Test: Regular  NST Contraction Frequency: 4-5  Acoustic Stimulator for Non-Stress Test: No  Interpretation of Non-Stress Test   Interpretation of Non-Stress Test: Equivocal

## 2024-07-24 NOTE — ANESTHESIA PREPROCEDURE EVALUATION
Patient: Ni Loera    Evaluation Method: In-person visit    Procedure Information    Date: 24  Procedure: Labor Analgesia        39w3d 28 y.o.  admitted in labor.      /77   Pulse 110   LMP 10/07/2023   SpO2 99%       Relevant Problems   Neuro   (+) Bilateral carpal tunnel syndrome      GI   (+) Gastroesophageal reflux disease      Endocrine   (+) Obesity affecting pregnancy in first trimester ([facility])      Musculoskeletal   (+) Bilateral carpal tunnel syndrome      GYN   (+) Supervision of other normal pregnancy, antepartum ([facility])       Clinical information reviewed:   Tobacco  Allergies  Meds   Med Hx  Surg Hx   Fam Hx  Soc Hx        NPO Detail: Per L&D protocol  No data recorded     OB/Gyn Evaluation    Present Pregnancy    Patient is pregnant now.   Obstetric History                Physical Exam    Airway  Mallampati: III  TM distance: >3 FB  Neck ROM: full     Cardiovascular    Dental    Pulmonary    Abdominal     Comments: gravid           Anesthesia Plan    History of general anesthesia?: yes  History of complications of general anesthesia?: no    ASA 2     epidural   (Patient has no history of problems with anesthesia  Patient has no family history of problems with anesthesia     I informed and discussed the risks and benefits of general, spinal and epidural anesthesia with the patient.  The patient expressed her understanding and her questions were answered.  A verbal consent was given by the patient.  )  The patient is not a current smoker.    Anesthetic plan and risks discussed with patient.  Use of blood products discussed with patient who consented to blood products.
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
## 2024-07-24 NOTE — CARE PLAN
The patient's goals for the shift include bond with baby    The clinical goals for the shift include return to predelivery state

## 2024-07-24 NOTE — LACTATION NOTE
Lactation Consultant Note  Lactation Consultation  Reason for Consult: Initial assessment  Consultant Name: Lynn Adame    Maternal Information  Has mother  before?: Yes  How long did the mother previously breastfeed?: 6 months exclusive pumping. Older child age 6.  Previous Maternal Breastfeeding Challenges: Exclusive pump and bottle fed  Infant to breast within first 2 hours of birth?: Yes  Exclusive Pump and Bottle Feed: Yes    Maternal Assessment  Breast Assessment: Large, Pendulous, Soft  Nipple Assessment: Intact, Erect  Areola Assessment: Normal    Infant Assessment  Infant Behavior: Awake, Rooting response, Sucking  Infant Assessment: Tongue protrudes over alveolar ridge, Sublingual frenulum (comment) (Frenulum attaches midline, tight. Tongue extends past alveolar ridge, limited elevation.)    Feeding Assessment  Nutrition Source: Breastmilk  Feeding Method: Nursing at the breast, Feeding expressed breastmilk, Syringe feeding  Feeding Position: Skin to skin, Cradle, Breast sandwich, Mother needs assistance with latch/positioning  Suck/Feeding: Sustained, Baby led rhythmically, Audible swallowing  Latch Assessment: Moderate assistance is needed, Instructed on deep latch, Wide open mouth < 160, Bursts of sucking, swallowing, and rest    LATCH TOOL  Latch: Grasps breast, tongue down, lips flanged, rhythmic sucking  Audible Swallowing: Spontaneous and intermittent (24 hours old)  Type of Nipple: Everted (After stimulation)  Comfort (Breast/Nipple): Filling, red/small blisters/bruises, mild/moderate discomfort  Hold (Positioning): Minimal assist, teach one side, mother does other, staff holds  LATCH Score: 8    Breast Pump  Pump: Hospital grade electric pump  Frequency: 8-10 times per day  Duration: 15-20 minutes per session  Breast Shield Size and Type: 21 mm  Volume of Milk Production: 5  Units of Volume: mL per session    Other OB Lactation Tools       Patient Follow-up  Inpatient Lactation Follow-up  "Needed : Yes  Outpatient Lactation Follow-up: Recommended    Other OB Lactation Documentation  Maternal Risk Factors: Hypertension, Obesity (pre-pregnancy BMI >30)  Infant Risk Factors: High birth weight >3600 g    Recommendations/Summary  , 39.2 weeks,  on @1348. Birthweight 4110g. 2.6@4 hours. Mother reports difficulty latching first child (age 6), exclusively pumped. Skin to skin during recovery, LC called to bedside for first feed.  Taught ABC's of good positioning: arms around breast, infant belly to belly with parent, infant's curve of hip to parent's curve of body. Taught to have infant rest their chin on the breast below the areola. Parents instructed to wait for gape reflex elicited by chin pressure on the breast, before hugging infant close to facilitate latching. Parents require assistance from IBCLC to time latching. Infant able to latch deeply with wide gape and sustained rhythmical sucking. Mother states, \"That feels so weird!\" Denies pain. RN reports mother took infant off breast soon after LC left room  LGA, first BG 64    Second consult, 1715: Parents report they would like to exclusively pump. Pumping initiated per parent request. Taught setup, use on initiate setting, and cleaning of pump. Reviewed need to pump for 15-20min at least every 3 hours. Any pumped milk to be given by spoon, cup, or oral syringe. May supplement with DHM or formula after pumping if infant still showing hunger cues after feeding expressed milk. Discussed infant stomach capacity. Educated to not feed more than 10mL per feeding in the first 24 hours.    Educated parents on skin to skin, hand expression, hunger cues and feeding frequency/patterns. Discussed expected output, weight loss <10%, and normal bilirubin. Educated on AAP pacifier recommendations. Reviewed outpatient resources.          "

## 2024-07-24 NOTE — L&D DELIVERY NOTE
OB Delivery Note  2024  Ni Loera  28 y.o.   Vaginal, Spontaneous       Gestational Age: 39w3d  /Para:   Quantitative Blood Loss: Admission to Discharge: 250 mL (2024 10:21 AM - 2024  6:37 PM)    Orville Loera [[id number]]      Labor Events    Sac identifier: Sac 1  Rupture date/time: 2024 1145  Rupture type: Artificial  Fluid color: Clear  Fluid odor: None  Labor type: Spontaneous Onset of Labor  Labor allowed to proceed with plans for an attempted vaginal birth?: Yes  Augmentation: None  Complications: None       Labor Event Times    Labor onset date/time: 2024 1030  Dilation complete date/time: 2024 1236  Start pushing date/time: 2024 1240       Labor Length    1st stage: 2h 06m  2nd stage: 1h 12m  3rd stage: 0h 06m       Placenta    Placenta delivery date/time: 2024 1354  Placenta removal: Spontaneous  Placenta appearance: Intact  Placenta disposition: discarded       Cord    Vessels: 3 vessels  Complications: Nuchal  Nuchal intervention: reduced  Nuchal cord description: loose nuchal cord  Number of loops: 1  Delayed cord clamping?: Yes  Cord clamped date/time: 2024 13:50:00  Cord blood disposition: Lab  Gases sent?: No  Stem cell collection (by provider): No       Lacerations    Episiotomy: None  Other lacerations?: No  Repair suture: None       Anesthesia    Method: Epidural       Operative Delivery    Forceps attempted?: No  Vacuum extractor attempted?: No       Shoulder Dystocia    Shoulder dystocia present?: No       [place] Delivery    Time head delivered: 2024 13:48:00  Birth date/time: 2024 13:48:00  Delivery type: Vaginal, Spontaneous  Complications: None       Resuscitation    Method: Suctioning, Tactile stimulation       Apgars    Living status: Living  Apgar Component Scores:  1 min.:  5 min.:  10 min.:  15 min.:  20 min.:    Skin color:  1  1       Heart rate:  2  2       Reflex irritability:  2  2       Muscle  tone:  2  2       Respiratory effort:  2  2       Total:  9  9       Apgars assigned by: SEAMUS CHAMBERS       Delivery Providers    Delivering clinician: Rhiannon Anna, DEWAYNE-JHON   Provider Role    Evelyn Lubin, RN Delivery Nurse    Sangita Winter, RN Nursery Nurse    Fide Salinas Resident                     Rhiannon Anna, DEWAYNE-VICMOB Delivery Note  2024  Adrisanju HERNANDES Loera  28 y.o.   Vaginal, Spontaneous       Gestational Age: 39w3d  /Para:   Quantitative Blood Loss: Admission to Discharge: 250 mL (2024 10:21 AM - 2024  6:37 PM)    Orville Loera [[id number]]      Labor Events    Sac identifier: Sac 1  Rupture date/time: 2024 1145  Rupture type: Artificial  Fluid color: Clear  Fluid odor: None  Labor type: Spontaneous Onset of Labor  Labor allowed to proceed with plans for an attempted vaginal birth?: Yes  Augmentation: None  Complications: None       Labor Event Times    Labor onset date/time: 2024 1030  Dilation complete date/time: 2024 1236  Start pushing date/time: 2024 1240       Labor Length    1st stage: 2h 06m  2nd stage: 1h 12m  3rd stage: 0h 06m       Placenta    Placenta delivery date/time: 2024 1354  Placenta removal: Spontaneous  Placenta appearance: Intact  Placenta disposition: discarded       Cord    Vessels: 3 vessels  Complications: Nuchal  Nuchal intervention: reduced  Nuchal cord description: loose nuchal cord  Number of loops: 1  Delayed cord clamping?: Yes  Cord clamped date/time: 2024 13:50:00  Cord blood disposition: Lab  Gases sent?: No  Stem cell collection (by provider): No       Lacerations    Episiotomy: None  Other lacerations?: No  Repair suture: None       Anesthesia    Method: Epidural       Operative Delivery    Forceps attempted?: No  Vacuum extractor attempted?: No       Shoulder Dystocia    Shoulder dystocia present?: No       [place] Delivery    Time head delivered: 2024 13:48:00  Birth date/time:  2024 13:48:00  Delivery type: Vaginal, Spontaneous  Complications: None       Resuscitation    Method: Suctioning, Tactile stimulation       Apgars    Living status: Living  Apgar Component Scores:  1 min.:  5 min.:  10 min.:  15 min.:  20 min.:    Skin color:  1  1       Heart rate:  2  2       Reflex irritability:  2  2       Muscle tone:  2  2       Respiratory effort:  2  2       Total:  9  9       Apgars assigned by: SEAMUS CHAMBESR       Delivery Providers    Delivering clinician: EDUARDO Reese   Provider Role    Evelyn Lubin, RN Delivery Nurse    Sangita Winter, RN Nursery Nurse    Fide Salinas Resident             JHON delivery note--prolonged second stage for multip with good efforts--suspected occiput posterior but consulted Dr Elizabeth to be available to stand by in case of shoulder dystocia; pitocin 2mu/min started with patient consent at 30-40 mins of pushing to facilitate closer contractions; Dr Elizabeth in room; spontaneous vaginal birth of baby in direct occiput posterior as head delivering but then rotated after ears delivered; loose nuchal cord x1 reduced with shoulder delivery; baby breathed and cried and is handed to kangaroo care; pitocin bolus initiated for third stage; placenta spontaneous and appears intact; trailing membranes removed gently with ring forcep and appear to be complete; perineum, vagina, vulva intact; QBL 250cc; ice pack to perineum.        EDUARDO Reese
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
## 2024-07-25 ENCOUNTER — LAB REQUISITION (OUTPATIENT)
Dept: LAB | Facility: HOSPITAL | Age: 29
End: 2024-07-25
Payer: COMMERCIAL

## 2024-07-25 LAB — FETAL MATERNAL SCREEN: NORMAL

## 2024-07-25 PROCEDURE — 1220000001 HC OB SEMI-PRIVATE ROOM DAILY

## 2024-07-25 PROCEDURE — 3E0234Z INTRODUCTION OF SERUM, TOXOID AND VACCINE INTO MUSCLE, PERCUTANEOUS APPROACH: ICD-10-PCS | Performed by: ADVANCED PRACTICE MIDWIFE

## 2024-07-25 PROCEDURE — 85461 HEMOGLOBIN FETAL: CPT

## 2024-07-25 PROCEDURE — 2500000004 HC RX 250 GENERAL PHARMACY W/ HCPCS (ALT 636 FOR OP/ED): Performed by: ADVANCED PRACTICE MIDWIFE

## 2024-07-25 PROCEDURE — 2500000001 HC RX 250 WO HCPCS SELF ADMINISTERED DRUGS (ALT 637 FOR MEDICARE OP): Performed by: ADVANCED PRACTICE MIDWIFE

## 2024-07-25 SDOH — HEALTH STABILITY: MENTAL HEALTH: HOW OFTEN DO YOU HAVE A DRINK CONTAINING ALCOHOL?: NEVER

## 2024-07-25 SDOH — HEALTH STABILITY: MENTAL HEALTH: HOW OFTEN DO YOU HAVE 6 OR MORE DRINKS ON ONE OCCASION?: NEVER

## 2024-07-25 SDOH — HEALTH STABILITY: MENTAL HEALTH: HOW MANY STANDARD DRINKS CONTAINING ALCOHOL DO YOU HAVE ON A TYPICAL DAY?: PATIENT DOES NOT DRINK

## 2024-07-25 SDOH — HEALTH STABILITY: MENTAL HEALTH: WERE YOU ABLE TO COMPLETE ALL THE BEHAVIORAL HEALTH SCREENINGS?: YES

## 2024-07-25 ASSESSMENT — LIFESTYLE VARIABLES
SKIP TO QUESTIONS 9-10: 1
AUDIT-C TOTAL SCORE: 0

## 2024-07-25 ASSESSMENT — PAIN SCALES - GENERAL
PAINLEVEL_OUTOF10: 3
PAIN_LEVEL: 0
PAINLEVEL_OUTOF10: 0 - NO PAIN
PAINLEVEL_OUTOF10: 0 - NO PAIN
PAINLEVEL_OUTOF10: 3

## 2024-07-25 ASSESSMENT — ACTIVITIES OF DAILY LIVING (ADL): LACK_OF_TRANSPORTATION: NO

## 2024-07-25 ASSESSMENT — PAIN DESCRIPTION - DESCRIPTORS: DESCRIPTORS: CRAMPING

## 2024-07-25 NOTE — CARE PLAN
The patient's goals for the shift include Bond with baby    The clinical goals for the shift include Return to pre pregnancy state.

## 2024-07-25 NOTE — CARE PLAN
Problem: Postpartum  Goal: Experiences normal postpartum course  2024 by Lynn Vásquez RN  Outcome: Progressing  2024 by Lynn Vásquez RN  Flowsheets (Taken 2024 1644 by Mily Burns RN)  Experiences normal postpartum course:   Monitor maternal vital signs   Med administration/monitoring of effect   Assist with identification of coping methods and supprot resources   Assess uterine involution   Fundal and lochia asssessments w/fundal massage, bladder emptying  Goal: Appropriate maternal -  bonding  2024 by Lynn Váqsuez RN  Outcome: Progressing  2024 by Lynn Vásquez RN  Flowsheets (Taken 2024)  Appropriate maternal- bonding:   Identify family support   24-hour rooming in   Assist with identification of coping methods and support resources  Goal: Establish and maintain infant feeding pattern for adequate nutrition  2024 by Lynn Vásquez RN  Outcome: Progressing  2024 by Lynn Vásquez RN  Flowsheets (Taken 2024 183)  Establish and maintain infant feeding pattern for adequate nutrition:   Assess  human milk feeding   Refer to lactation consultant as needed   Encourage feeding and/or pumping at least 8x/day  Goal: Minimal s/sx of HDP and BP<160/110  2024 by Lynn Vásquez RN  Outcome: Progressing  2024 by Lynn Vásquez RN  Flowsheets (Taken 2024)  Minimal s/sx of HDP and BP <160/110:   Monitor QBL and vital signs   Med administration/monitoring of effect   The patient's goals for the shift include Bond with baby    The clinical goals for the shift include Return to pre pregnancy state.
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
## 2024-07-25 NOTE — CARE PLAN
The patient's goals for the shift include bond with baby    The clinical goals for the shift include vitals wnl      Problem: Postpartum  Goal: Experiences normal postpartum course  Outcome: Progressing     Problem: Postpartum  Goal: Appropriate maternal -  bonding  Outcome: Progressing     Problem: Postpartum  Goal: Establish and maintain infant feeding pattern for adequate nutrition  Outcome: Progressing     Problem: Postpartum  Goal: Incisions, wounds, or drain sites healing without S/S of infection  Outcome: Progressing
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
## 2024-07-25 NOTE — ANESTHESIA POSTPROCEDURE EVALUATION
"Patient: Ni Loera    Procedure Summary       Date: 24 Room / Location:     Anesthesia Start: 1114 Anesthesia Stop: 1348    Procedure: Labor Analgesia Diagnosis:     Scheduled Providers:  Responsible Provider: GARY Cleary    Anesthesia Type: epidural ASA Status: 2            Anesthesia Type: epidural     39w3d 28 y.o.     /80   Pulse 90   Temp 36.7 °C (98.1 °F) (Oral)   Resp 16   Ht 1.702 m (5' 7\")   Wt 109 kg (240 lb 4.8 oz)   LMP 10/07/2023   SpO2 97%   Breastfeeding Yes   BMI 37.64 kg/m²       Anesthesia Post Evaluation    Patient location during evaluation: bedside  Patient participation: complete - patient participated  Level of consciousness: awake and alert  Pain score: 0  Pain management: adequate  Airway patency: patent  Cardiovascular status: acceptable  Respiratory status: acceptable  Hydration status: acceptable  Postoperative Nausea and Vomiting: none  Comments: Epidural catheter removed by nursing. No redness, swelling, or drainage at puncture site.    Complete resolution of numbness. Patient is able to lift legs, bend at the knees, and ambulate.    Patient denies problems with urination.    Patient denies nausea, headache or back pain.          No notable events documented.    "

## 2024-07-25 NOTE — CARE PLAN
The patient's goals for the shift include Bond with baby    The clinical goals for the shift include Return to pre pregnancy state.    Over the shift, the patient did not make progress toward the following goals. Barriers to progression include ***. Recommendations to address these barriers include ***.

## 2024-07-26 VITALS
DIASTOLIC BLOOD PRESSURE: 91 MMHG | WEIGHT: 240.3 LBS | TEMPERATURE: 98.2 F | OXYGEN SATURATION: 98 % | HEART RATE: 87 BPM | RESPIRATION RATE: 20 BRPM | SYSTOLIC BLOOD PRESSURE: 137 MMHG | HEIGHT: 67 IN | BODY MASS INDEX: 37.72 KG/M2

## 2024-07-26 PROBLEM — O99.211 OBESITY AFFECTING PREGNANCY IN FIRST TRIMESTER (HHS-HCC): Status: RESOLVED | Noted: 2023-12-06 | Resolved: 2024-07-26

## 2024-07-26 PROBLEM — O16.3 ELEVATED BLOOD PRESSURE AFFECTING PREGNANCY IN THIRD TRIMESTER, ANTEPARTUM (HHS-HCC): Status: RESOLVED | Noted: 2024-05-16 | Resolved: 2024-07-26

## 2024-07-26 PROBLEM — Z34.80 SUPERVISION OF OTHER NORMAL PREGNANCY, ANTEPARTUM (HHS-HCC): Status: RESOLVED | Noted: 2023-12-06 | Resolved: 2024-07-26

## 2024-07-26 PROBLEM — O13.9 GESTATIONAL HYPERTENSION (HHS-HCC): Status: ACTIVE | Noted: 2024-07-26

## 2024-07-26 PROCEDURE — 99238 HOSP IP/OBS DSCHRG MGMT 30/<: CPT | Performed by: ADVANCED PRACTICE MIDWIFE

## 2024-07-26 PROCEDURE — 2500000001 HC RX 250 WO HCPCS SELF ADMINISTERED DRUGS (ALT 637 FOR MEDICARE OP): Performed by: ADVANCED PRACTICE MIDWIFE

## 2024-07-26 RX ORDER — DOCUSATE SODIUM 100 MG/1
100 CAPSULE, LIQUID FILLED ORAL 2 TIMES DAILY PRN
Qty: 60 CAPSULE | Refills: 0 | Status: SHIPPED | OUTPATIENT
Start: 2024-07-26 | End: 2024-08-01 | Stop reason: WASHOUT

## 2024-07-26 RX ORDER — ACETAMINOPHEN 500 MG
1000 TABLET ORAL EVERY 6 HOURS PRN
Qty: 120 TABLET | Refills: 0 | Status: SHIPPED | OUTPATIENT
Start: 2024-07-26

## 2024-07-26 RX ORDER — ACETAMINOPHEN 500 MG
1 TABLET ORAL 2 TIMES DAILY
Qty: 1 EACH | Refills: 0 | Status: SHIPPED | OUTPATIENT
Start: 2024-07-26

## 2024-07-26 RX ORDER — IBUPROFEN 600 MG/1
600 TABLET ORAL EVERY 6 HOURS PRN
Qty: 120 TABLET | Refills: 0 | Status: SHIPPED | OUTPATIENT
Start: 2024-07-26

## 2024-07-26 SDOH — SOCIAL STABILITY: SOCIAL INSECURITY: HAVE YOU HAD THOUGHTS OF HARMING ANYONE ELSE?: NO

## 2024-07-26 SDOH — HEALTH STABILITY: MENTAL HEALTH: WISH TO BE DEAD (PAST 1 MONTH): NO

## 2024-07-26 SDOH — HEALTH STABILITY: MENTAL HEALTH: SUICIDAL BEHAVIOR (LIFETIME): NO

## 2024-07-26 SDOH — SOCIAL STABILITY: SOCIAL INSECURITY: DO YOU FEEL ANYONE HAS EXPLOITED OR TAKEN ADVANTAGE OF YOU FINANCIALLY OR OF YOUR PERSONAL PROPERTY?: NO

## 2024-07-26 SDOH — SOCIAL STABILITY: SOCIAL INSECURITY: ARE YOU OR HAVE YOU BEEN THREATENED OR ABUSED PHYSICALLY, EMOTIONALLY, OR SEXUALLY BY ANYONE?: NO

## 2024-07-26 SDOH — HEALTH STABILITY: MENTAL HEALTH: NON-SPECIFIC ACTIVE SUICIDAL THOUGHTS (PAST 1 MONTH): NO

## 2024-07-26 SDOH — ECONOMIC STABILITY: HOUSING INSECURITY: DO YOU FEEL UNSAFE GOING BACK TO THE PLACE WHERE YOU ARE LIVING?: NO

## 2024-07-26 SDOH — SOCIAL STABILITY: SOCIAL INSECURITY: HAVE YOU HAD ANY THOUGHTS OF HARMING ANYONE ELSE?: NO

## 2024-07-26 SDOH — HEALTH STABILITY: MENTAL HEALTH: WERE YOU ABLE TO COMPLETE ALL THE BEHAVIORAL HEALTH SCREENINGS?: YES

## 2024-07-26 SDOH — SOCIAL STABILITY: SOCIAL INSECURITY: HAS ANYONE EVER THREATENED TO HURT YOUR FAMILY OR YOUR PETS?: NO

## 2024-07-26 SDOH — SOCIAL STABILITY: SOCIAL INSECURITY: ARE THERE ANY APPARENT SIGNS OF INJURIES/BEHAVIORS THAT COULD BE RELATED TO ABUSE/NEGLECT?: NO

## 2024-07-26 SDOH — SOCIAL STABILITY: SOCIAL INSECURITY: ABUSE SCREEN: ADULT

## 2024-07-26 SDOH — SOCIAL STABILITY: SOCIAL INSECURITY: VERBAL ABUSE: DENIES

## 2024-07-26 SDOH — SOCIAL STABILITY: SOCIAL INSECURITY: PHYSICAL ABUSE: DENIES

## 2024-07-26 SDOH — SOCIAL STABILITY: SOCIAL INSECURITY: DOES ANYONE TRY TO KEEP YOU FROM HAVING/CONTACTING OTHER FRIENDS OR DOING THINGS OUTSIDE YOUR HOME?: NO

## 2024-07-26 ASSESSMENT — PAIN DESCRIPTION - LOCATION: LOCATION: ABDOMEN

## 2024-07-26 ASSESSMENT — PAIN SCALES - GENERAL
PAINLEVEL_OUTOF10: 0 - NO PAIN
PAINLEVEL_OUTOF10: 3
PAINLEVEL_OUTOF10: 7

## 2024-07-26 ASSESSMENT — PATIENT HEALTH QUESTIONNAIRE - PHQ9
SUM OF ALL RESPONSES TO PHQ9 QUESTIONS 1 & 2: 0
2. FEELING DOWN, DEPRESSED OR HOPELESS: NOT AT ALL
1. LITTLE INTEREST OR PLEASURE IN DOING THINGS: NOT AT ALL
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
## 2024-07-26 NOTE — LACTATION NOTE
Lactation Consultant Note  Lactation Consultation              Recommendations/Summary  Mother provided with soothie gel pads and thera shells for nipple discomfort. Nursing staff noted 2 large water blisters in farthest back part of NB pallet. LC called pediatrician for examination and he is consulting NICU at this time

## 2024-07-26 NOTE — CARE PLAN
The patient's goals for the shift include bond with baby    The clinical goals for the shift include remain hemodynamically stable      Problem: Postpartum  Goal: Experiences normal postpartum course  Outcome: Progressing     Problem: Postpartum  Goal: Appropriate maternal -  bonding  Outcome: Progressing     Problem: Postpartum  Goal: Establish and maintain infant feeding pattern for adequate nutrition  Outcome: Progressing     Problem: Postpartum  Goal: Minimal s/sx of HDP and BP<160/110  Outcome: Progressing

## 2024-07-26 NOTE — DISCHARGE INSTRUCTIONS

## 2024-07-26 NOTE — LACTATION NOTE
Lactation Consultant Note  Lactation Consultation  Reason for Consult: Follow-up assessment  Consultant Name: Fahad CHAMBERS, IBCLC    Maternal Information  Has mother  before?: Yes  How long did the mother previously breastfeed?: 1 month for daughter (6 yo)  Previous Maternal Breastfeeding Challenges: Lack of support  Infant to breast within first 2 hours of birth?: Yes  Exclusive Pump and Bottle Feed: Yes    Maternal Assessment  Breast Assessment:  (not assessed at this time)  Nipple Assessment: Other (Comment) (not assessed at this time)  [place] Assessment: Normal    Infant Assessment  Infant Behavior: Deep sleep  Infant Assessment: Other (Comment) (reported blisters in mouth per mom and MD, unable to visualize at time of consult infant asleep)    Feeding Assessment  Nutrition Source: Breastmilk, Formula (per mother’s request)  Feeding Method: Nursing at the breast  Unable to assess infant feeding at this time: Other (Comment) (infant asleep)  Feeding Position: Skin to skin, Cradle, Breast sandwich, Mother needs assistance with latch/positioning  Suck/Feeding: Sustained, Baby led rhythmically, Audible swallowing  Latch Assessment: Moderate assistance is needed, Instructed on deep latch, Wide open mouth < 160, Bursts of sucking, swallowing, and rest    LATCH TOOL  Latch: Grasps breast, tongue down, lips flanged, rhythmic sucking  Audible Swallowing: Spontaneous and intermittent (24 hours old)  Type of Nipple: Everted (After stimulation)  Comfort (Breast/Nipple): Filling, red/small blisters/bruises, mild/moderate discomfort  Hold (Positioning): Minimal assist, teach one side, mother does other, staff holds  LATCH Score: 8    Breast Pump  Pump: Hospital grade electric pump, Individual user electric pump  Frequency: 8-10 times per day  Duration: 15-20 minutes per session  Breast Shield Size and Type: 21 mm  Volume of Milk Production: 5  Units of Volume: Drops    Other OB Lactation Tools       Patient  Follow-up  Inpatient Lactation Follow-up Needed : No  Outpatient Lactation Follow-up: Recommended  Lactation Professional - OK to Discharge: Yes    Other OB Lactation Documentation  Maternal Risk Factors: Hypertension, Obesity (pre-pregnancy BMI >30)  Infant Risk Factors: High birth weight >3600 g    Recommendations/Summary  Patient is a 28 year old, , 39.3 week vaginal delivery on 24 at 1348. Infant with birth weight of 4110g, current weight is 3924g, weight loss is 4.53%. Infant asleep at time of consult. Per previous lactation note, mother wishes to exclusively pump at this time due to sore nipples and infant's aggressive suck, supplementing with donor milk/formula until appropriate milk volumes obtained pumping. Mother interested in latching in the future. Outpatient resources reviewed and breast feeding folder given. Reviewed setup, use on initiate setting, and cleaning of pump. Reviewed need to pump for 15-20min after each feed/at least every 3 hours. Any pumped milk to be given by spoon, cup, or oral syringe. Mother also brought in pump from home to use.    Cue based feeding, at least 8-12 times in 24 hours  Frequent skin to skin  Pump every 2-3 hours for 15 minutes on initiation mode  Call for assistance as needed

## 2024-07-26 NOTE — CARE PLAN
Over the shift, the patient met all goals.     Problem: Postpartum  Goal: Experiences normal postpartum course  2024 by Lexy Thomas RN  Outcome: Met  2024 by Lexy Thomas RN  Flowsheets (Taken 2024)  Experiences normal postpartum course:   Monitor maternal vital signs   Med administration/monitoring of effect   Assist with identification of coping methods and supprot resources   Assess uterine involution   Fundal and lochia asssessments w/fundal massage, bladder emptying  Goal: Appropriate maternal -  bonding  2024 by Lexy Thomas RN  Outcome: Met  2024 by Lexy Thomas RN  Flowsheets (Taken 2024)  Appropriate maternal- bonding:   Identify family support   Referral to  or  as needed   24-hour rooming in   Assist with identification of coping methods and support resources  Goal: Establish and maintain infant feeding pattern for adequate nutrition  2024 by Lexy Thomas RN  Outcome: Met  2024 by Lexy Thomas RN  Flowsheets (Taken 2024)  Establish and maintain infant feeding pattern for adequate nutrition:   Assess  human milk feeding   Refer to lactation consultant as needed   Encourage feeding and/or pumping at least 8x/day  Goal: Minimal s/sx of HDP and BP<160/110  2024 by Lexy Thomas RN  Outcome: Met  2024 by Lexy Thomas RN  Flowsheets (Taken 2024)  Minimal s/sx of HDP and BP <160/110:   Med administration/monitoring of effect   Monitor QBL and vital signs     The patient's goals for the shift include bond with baby    The clinical goals for the shift include Minimal signs/symptoms of HDP

## 2024-07-26 NOTE — DISCHARGE SUMMARY
Discharge Summary    Admission Date: 7/24/2024  Discharge Date: 07/26/24    Discharge Diagnosis  Normal vaginal delivery   Gestational hypertension    Hospital Course  Delivery Date: 7/24/2024 1:48 PM  Delivery type: Vaginal, Spontaneous   GA at delivery: 39w3d  Outcome: Living  Anesthesia during delivery: Epidural  Intrapartum complications:Gestational hypertension  Feeding method: Breastfeeding Status: Yes (pumping)     Procedures: none    Pertinent Physical Exam At Time of Discharge  See PP Progress note    Last Vitals:  Temp Pulse Resp BP MAP Pulse Ox   36.8 °C (98.2 °F) 87 20 (!) 137/91 90 98 %     Discharge Meds     Your medication list        START taking these medications        Instructions Last Dose Given Next Dose Due   acetaminophen 500 mg tablet  Commonly known as: Tylenol Extra Strength      Take 2 tablets (1,000 mg) by mouth every 6 hours if needed for mild pain (1 - 3).       blood pressure test kit-large kit      1 Device by Not Applicable route 2 times a day. Relax, sitting down for at least 5 mins before check BP. Avoid caffeine, exercise, and smoking for at least 30 minutes before measurement. Do not talk during the rest period or during the measurement.       docusate sodium 100 mg capsule  Commonly known as: Colace      Take 1 capsule (100 mg) by mouth 2 times a day as needed for constipation.       ibuprofen 600 mg tablet      Take 1 tablet (600 mg) by mouth every 6 hours if needed for mild pain (1 - 3) or moderate pain (4 - 6).              CONTINUE taking these medications        Instructions Last Dose Given Next Dose Due   famotidine 20 mg tablet  Commonly known as: Pepcid      Take 1 tablet (20 mg) by mouth 2 times a day.       prenatal vitamin (iron-folic) 27 mg iron-800 mcg folic acid tablet      Take 1 tablet by mouth once daily.              STOP taking these medications      Prenatal 19 29 mg iron- 1 mg tablet,chewable  Generic drug: prenatal no115-iron-folic acid               ASK  your doctor about these medications        Instructions Last Dose Given Next Dose Due   metroNIDAZOLE 500 mg tablet  Commonly known as: Flagyl  Ask about: Should I take this medication?      Take 1 tablet (500 mg) by mouth 2 times a day for 7 days.                 Where to Get Your Medications        These medications were sent to [facility]/pharmacy #[id number] - [place], [place] - [address] [facility]  [address] [address], located within [facility] [address]      Phone: [phone]   acetaminophen 500 mg tablet  blood pressure test kit-large kit  docusate sodium 100 mg capsule  ibuprofen 600 mg tablet          Complications Requiring Follow-Up  Gestational hypertension    Test Results Pending At Discharge  Pending Labs       No current pending labs.            Outpatient Follow-Up  Future Appointments   Date Time Provider Department Center   7/31/2024  1:40 PM EDUARDO Salgado [id number] EDUARDO Augustin

## 2024-07-27 ENCOUNTER — PATIENT MESSAGE (OUTPATIENT)
Dept: OBSTETRICS AND GYNECOLOGY | Facility: CLINIC | Age: 29
End: 2024-07-27
Payer: COMMERCIAL

## 2024-07-27 DIAGNOSIS — O13.9 GESTATIONAL HYPERTENSION, ANTEPARTUM (HHS-HCC): Primary | ICD-10-CM

## 2024-07-28 LAB
BLOOD EXPIRATION DATE: NORMAL
BLOOD EXPIRATION DATE: NORMAL
DISPENSE STATUS: NORMAL
DISPENSE STATUS: NORMAL
PRODUCT BLOOD TYPE: 9500
PRODUCT BLOOD TYPE: 9500
PRODUCT CODE: NORMAL
PRODUCT CODE: NORMAL
UNIT ABO: NORMAL
UNIT ABO: NORMAL
UNIT NUMBER: NORMAL
UNIT NUMBER: NORMAL
UNIT RH: NORMAL
UNIT RH: NORMAL
UNIT VOLUME: 350
UNIT VOLUME: 350
XM INTEP: NORMAL
XM INTEP: NORMAL

## 2024-07-29 RX ORDER — NEBULIZER AND COMPRESSOR
1 EACH MISCELLANEOUS 2 TIMES DAILY
Qty: 1 EACH | Refills: 0 | Status: SHIPPED | OUTPATIENT
Start: 2024-07-29

## 2024-07-31 ENCOUNTER — APPOINTMENT (OUTPATIENT)
Dept: OBSTETRICS AND GYNECOLOGY | Facility: CLINIC | Age: 29
End: 2024-07-31
Payer: COMMERCIAL

## 2024-07-31 VITALS — WEIGHT: 225.6 LBS | BODY MASS INDEX: 35.33 KG/M2 | SYSTOLIC BLOOD PRESSURE: 116 MMHG | DIASTOLIC BLOOD PRESSURE: 79 MMHG

## 2024-07-31 DIAGNOSIS — Z30.09 BIRTH CONTROL COUNSELING: ICD-10-CM

## 2024-07-31 DIAGNOSIS — Z01.30 BLOOD PRESSURE CHECK: Primary | ICD-10-CM

## 2024-07-31 PROBLEM — R07.9 CHEST PAIN: Status: ACTIVE | Noted: 2023-09-17

## 2024-07-31 PROBLEM — M94.0 CHONDROCOSTAL JUNCTION SYNDROME: Status: ACTIVE | Noted: 2023-09-17

## 2024-07-31 PROBLEM — J35.01 CHRONIC TONSILLITIS: Status: ACTIVE | Noted: 2024-07-31

## 2024-07-31 PROBLEM — Z88.2 ALLERGY STATUS TO SULFONAMIDES: Status: ACTIVE | Noted: 2023-09-17

## 2024-07-31 RX ORDER — IBUPROFEN 800 MG/1
1 TABLET ORAL 2 TIMES DAILY PRN
COMMUNITY
Start: 2024-03-22

## 2024-07-31 RX ORDER — NORETHINDRONE ACETATE AND ETHINYL ESTRADIOL 1MG-20(21)
1 KIT ORAL
COMMUNITY
Start: 2023-10-05

## 2024-07-31 RX ORDER — MUPIROCIN 20 MG/G
OINTMENT TOPICAL
COMMUNITY
Start: 2024-07-26

## 2024-07-31 ASSESSMENT — EDINBURGH POSTNATAL DEPRESSION SCALE (EPDS)
THE THOUGHT OF HARMING MYSELF HAS OCCURRED TO ME: NEVER
I HAVE FELT SAD OR MISERABLE: NOT VERY OFTEN
TOTAL SCORE: 9
I HAVE BLAMED MYSELF UNNECESSARILY WHEN THINGS WENT WRONG: NOT VERY OFTEN
I HAVE BEEN SO UNHAPPY THAT I HAVE HAD DIFFICULTY SLEEPING: NOT VERY OFTEN
THINGS HAVE BEEN GETTING ON TOP OF ME: NO, MOST OF THE TIME I HAVE COPED QUITE WELL
I HAVE LOOKED FORWARD WITH ENJOYMENT TO THINGS: RATHER LESS THAN I USED TO
I HAVE BEEN SO UNHAPPY THAT I HAVE BEEN CRYING: ONLY OCCASIONALLY
I HAVE FELT SCARED OR PANICKY FOR NO GOOD REASON: NO, NOT MUCH
I HAVE BEEN ANXIOUS OR WORRIED FOR NO GOOD REASON: YES, SOMETIMES
I HAVE BEEN ABLE TO LAUGH AND SEE THE FUNNY SIDE OF THINGS: AS MUCH AS I ALWAYS COULD
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
## 2024-08-01 ENCOUNTER — TELEMEDICINE (OUTPATIENT)
Dept: BEHAVIORAL HEALTH | Facility: CLINIC | Age: 29
End: 2024-08-01
Payer: COMMERCIAL

## 2024-08-01 DIAGNOSIS — Z86.59 HISTORY OF DEPRESSION: ICD-10-CM

## 2024-08-01 PROCEDURE — 99213 OFFICE O/P EST LOW 20 MIN: CPT | Performed by: CLINICAL NURSE SPECIALIST

## 2024-08-01 NOTE — PROGRESS NOTES
Subjective   Patient ID: Ni Loera is a 28 y.o. female who presents for MDD.     HPI  Review of Systems   All other systems reviewed and are negative.     Psych Review of Symptoms  Objective   Physical Exam  Psychiatric:         Attention and Perception: Attention and perception normal.         Mood and Affect: Mood and affect normal.         Speech: Speech normal.         Behavior: Behavior normal. Behavior is cooperative.         Thought Content: Thought content normal.         Cognition and Memory: Cognition and memory normal.         Judgment: Judgment normal.     Assessment/Plan   IMP: 29 yo SF presents for evaluation of  postpartum depression given her history of same after delivery of first child 7 yo ago. Currently she feels she has episodes of depression for about a week every 1-2 months, which resolve without treatment. Reports anxiety at times, feels she has been managing this. Not interested in medication at this time.     Presents at one week pp delivered baby boy, Negro, on 2024 at 39.4 wga per .  Baby is breat and pumping. Mood is pretty good, denies depressed, sad, tearful mood no SI/HI. Notes has hx of low mood, decreased motivation, wants to be in a dark room and  sleeping, not occurred for over to month. Sleep gets about 2-3 hours stretches for 5-6 total hours at night and napping in daytime. Baby achieving developmental milestones. Partner , parents, step parents helping w/ childcare. Denies irrational fears . Seeing [facility] for ind therapy w/ focus on PMADs.  FMLA until 2024.           Low risk of harm due to previous PP Depression, being unmarried, food insecurity, familial history of mood disorders, and not taking medication. She is protected by her stable housing, partner support 6 year old daughter, help seeking nature, employed full time, willingness to attend individual therapy, and resilience.      Diagnosis   MDD, Recurrent Mild, Peripartum Onset   One  week pp   Single Status   Risk for  Mood/ Anxiety Disorder      Treatment   Cont ind therapy w/ [facility] Provider   RTC in 6 weeks s   Contact provider as needed per MYCHART    Refer to online support services at next [email or web address]

## 2024-08-09 ENCOUNTER — APPOINTMENT (OUTPATIENT)
Dept: OBSTETRICS AND GYNECOLOGY | Facility: CLINIC | Age: 29
End: 2024-08-09
Payer: COMMERCIAL

## 2024-08-09 VITALS — SYSTOLIC BLOOD PRESSURE: 115 MMHG | DIASTOLIC BLOOD PRESSURE: 78 MMHG

## 2024-08-09 DIAGNOSIS — O13.9 GESTATIONAL HYPERTENSION, ANTEPARTUM (HHS-HCC): Primary | ICD-10-CM

## 2024-08-09 PROCEDURE — 3074F SYST BP LT 130 MM HG: CPT

## 2024-08-09 PROCEDURE — 1036F TOBACCO NON-USER: CPT

## 2024-08-09 PROCEDURE — 99213 OFFICE O/P EST LOW 20 MIN: CPT

## 2024-08-09 PROCEDURE — 3078F DIAST BP <80 MM HG: CPT

## 2024-08-09 NOTE — PROGRESS NOTES
Subjective   28 y.o.  presenting for postpartum follow-up   Delivery Date:   Type of Delivery:   Intrapartum complications: gHTN, not medicated.     Here for postpartum follow-up for diagnosis of gestational hypertension intrapartum.  Patient recently seen by CNM on  for an office blood pressure check as patient had difficulty obtaining blood pressure cuff and blood pressure at that time was within normal limits.  Patient denied any signs or symptoms of gestational hypertension at that time.  Patient has since picked up blood pressure cuff and monitor blood pressure.     : 119/80   : 121/84, 115/83, 121/92  : 115/75  : 109/83, 117/76  : 115/78    Patient without any signs or symptoms of [place] including unrelieved headache, visual changes, epigastric pain, increasing swelling, chest pain, shortness of breath.  Problem List       No episode was linked to this visit.              PP Recovery:   Pain: controlled  Lacerations: none  Lochia: resolved  Feeding Method:  Pumping and formula feeding  no breast or nursing problems  Lactation Consult Needed?: No  Birth Trauma: No    Depression - Denies s/s depression.    Postpartum Depression: Medium Risk (2024)    [place]  Depression Scale     Last EPDS Total Score: 9     Last EPDS Self Harm Result: Never         Physical Exam  Constitutional:       Appearance: Normal appearance.   Eyes:      Conjunctiva/sclera: Conjunctivae normal.   Pulmonary:      Effort: Pulmonary effort is normal.   Neurological:      Mental Status: She is alert.   Psychiatric:         Mood and Affect: Mood normal.          Plan    gHTN: 1 mild range blood pressure noted out of 8 values. Patient instructed to continue to monitor blood pressures daily until she has had all normal values for 1 week.  Patient educated that normal blood pressures should be under 140 for the top number and under 90 for the bottom number.    Patient instructed to continue to monitor  for signs or symptoms of high blood pressure and if she experiences symptoms or increases in elevation of blood pressure to reach out to providers.    Follow up for 6 week pp visit as scheduled    EDUARDO Caballero

## 2024-08-13 ENCOUNTER — APPOINTMENT (OUTPATIENT)
Dept: BEHAVIORAL HEALTH | Facility: CLINIC | Age: 29
End: 2024-08-13
Payer: COMMERCIAL

## 2024-08-19 ENCOUNTER — APPOINTMENT (OUTPATIENT)
Dept: BEHAVIORAL HEALTH | Facility: CLINIC | Age: 29
End: 2024-08-19
Payer: COMMERCIAL

## 2024-08-19 DIAGNOSIS — Z86.59 HISTORY OF DEPRESSION: ICD-10-CM

## 2024-08-19 PROCEDURE — 90837 PSYTX W PT 60 MINUTES: CPT

## 2024-08-19 NOTE — PSYCHOTHERAPY
"Baby born on 7/24, doing well  Mom and his dad were there, dtr is 6    Dtr is jealous, doesn't want others to hold him  On mternity leave until middle of November,   Broke up w/ baby's father yesterday, is not comfortable w/ him staying the night  He wanted baby to stay at his house    His dad has been with her, baby is colicky  His family called her, blocked their numbers    Baby's father - was her boyfriend, no physical relationship, no attention  Normally can talk it out and be ok    Allowed the baby to go for a couple of hours, argued with her  He wouldn't tell her where the baby was    Dtr's dad doesn't care, used to being the parent    This is a new situation  Ex-boyfriend was there everyday, which was fine  Past 3 days, been apart    Anti-social, no friends  He's been primary support.     \"I'm not important\" \"I'm not attractive\"  Different love languages  He's said hurtful things    He is jealous, said mean things  Wants it to work, not a healthy dynamic    Issue is him staying the night, he's too little  Other view her as keeping him away    Mentioned a schedule, dismissed the idea    [place] - off on Sundays and Mondays    He has a 7-year-old son, likes to sleep with [place]  Don't want to use the baby as a pawn  I'm nothing like those other mothers    Set up a schedule, able to plan  Don't want to force anyone to understand    Mama bear mindset  Had PPD before dtr was born, sense of purpose  Baby has screaming fits, its overwhelming    Mood check - no appetite, mood ok, some anxiety, gets over-stimulated, not depressed  Breast feeding, pumping, formula at night  Able to make accommodations for baby's colic    [place] can come over anytime  Was going ok, had an argument      Advantages -  1.wouldn't be lonely  2.he doesn't respect her, controlling, always walk on eggshells    Disadvantages  1.talks down to her  2.he and her parents don't get along  Parents don't like him    HW: talk w/ Jenniffer when he's " calm  Brain and heart are at war, wants to have a happy family  Focus on relationship between the two of them

## 2024-08-19 NOTE — PROGRESS NOTES
Therapy Session  Progress Note    Session Type: Virtual    Start Time: 10:00 am  End Time: 10:55 am    Appointment: Scheduled    Reason for Visit: Depression    Patient Symptoms/Interval History: Caring for her new baby boy, has relationship issues with the baby's father. Got into a heated argument and now unsure whether they will stay together or not. Trying to consider the baby's best interests and feels protective of him. Has support from her family.     Mental Status: Alert & Oriented x3    Functional Status: No impairment    Interventions Provided: CBT, Communication Training    Progress Made: Moderate    Response to Interventions: The patient was receptive to the interventions provided.    Action Plan/Homework: The patient would like to have a conversation with her boyfriend when he is calmer to discuss their relationship.    Treatment Plan: Use CBT to help the patient gain awareness of unhelpful thought patterns and core beliefs. Help the patient learn healthy communication skills.    Follow Up/Next Appointment: Follow up in 2 weeks.      LYLE Solano, RENETTAS    **This visit was performed using real-time telehealth tools, including an audio/video connection between the patient and this provider. The patient provided consent for the individual telemedicine service and understands the limitations of the visit.

## 2024-08-20 ENCOUNTER — APPOINTMENT (OUTPATIENT)
Dept: PRIMARY CARE | Facility: CLINIC | Age: 29
End: 2024-08-20
Payer: COMMERCIAL

## 2024-08-28 ENCOUNTER — APPOINTMENT (OUTPATIENT)
Dept: OBSTETRICS AND GYNECOLOGY | Facility: CLINIC | Age: 29
End: 2024-08-28
Payer: COMMERCIAL

## 2024-08-30 ENCOUNTER — APPOINTMENT (OUTPATIENT)
Dept: BEHAVIORAL HEALTH | Facility: CLINIC | Age: 29
End: 2024-08-30
Payer: COMMERCIAL

## 2024-08-30 DIAGNOSIS — Z86.59 HISTORY OF DEPRESSION: ICD-10-CM

## 2024-08-30 PROCEDURE — 90837 PSYTX W PT 60 MINUTES: CPT

## 2024-08-30 NOTE — PSYCHOTHERAPY
Dtr is over-emotional, crying a lot  Jealous, not full attention  HW follow-up - had a conversation  Explained love languages, conversation went better, listened to each  other    Not speakig over each other, allowed each other to have time to talk, listened, rather than yelling  He has his own issues, he stresses when doesn't get full paycheck, he shuts down, leaves    If they are together, he should be able to voice, not go home  Have same issue with herself, learned communicating is helpful    Guera when overwhelmed, takes a shower  He's a  for [employer], het gets overwhelmed  It affects their relationship, he is at her house    He's been in toxic relationship sbefore, afraid of getting kicked out    She tries to reassure him  Values - would like them to move in together, be a family, don't want to rush it  He wants to stay in [place], she lives in [place]  Goal to get out of [place], not going back    It frustrates him,   Willng to compromise    Has been on her own, likes her independent traits, mom taught her responsibility, got a job age 17, saving money, bank account, worked multiple jobs, don't like to ask people for help, in control of own financials and kids    Brought up differently, his mom on drugs, dad in [place], homeless  He is in survival mode  He has anxiety,     Daughter - loves him, says he's my second dad  BF is good with he, he is scared of him calling him dad  Her dad lives in [place] doesn't answer dtr's calls    Never pictured that being the outcome  Met him in high school    Bio dad - he lives in [place], family lives in [place]  Mom had her at 16, dad was 15  Step dad at age 21  Bio dad - invited him over, he listened, saw his point of view  Talk daily now  Definitely nice, helps to heal    Mom gets on her nerves, don't know what she'd do without her  Mom watches baby  Her and full brother - a mean person    Dad's kids live with him, 4 kids, ages 8-19  14, 12, 8  She was a mean  "person in teen years, fought a lot then, work in progress  Has apologized, took it out on them    Angry teen  Ages 14 and 15, biggest reason she was anger, they got to experience a better upbringing than she did  A jealousy thing, they were spoiled,     Dad was 15 when she was born,   He would leave when she showed up, 9 or 10 - had other priorities    He is at a more mature place in his life. He has helped her out financially  Buys things for her, more involved, texts her, Facetimes  Healing the \"daddy issues\"    Step-dad also supportive, feels responsible for her daughter, bought her school supplies  Mom helps out too  Working on communication     HW: working on communication w/ BF -                                                                                                                                                                                                           "

## 2024-09-04 ENCOUNTER — APPOINTMENT (OUTPATIENT)
Dept: OBSTETRICS AND GYNECOLOGY | Facility: CLINIC | Age: 29
End: 2024-09-04
Payer: COMMERCIAL

## 2024-09-04 ENCOUNTER — APPOINTMENT (OUTPATIENT)
Dept: BEHAVIORAL HEALTH | Facility: CLINIC | Age: 29
End: 2024-09-04
Payer: COMMERCIAL

## 2024-09-04 VITALS
WEIGHT: 218 LBS | SYSTOLIC BLOOD PRESSURE: 108 MMHG | BODY MASS INDEX: 34.21 KG/M2 | DIASTOLIC BLOOD PRESSURE: 69 MMHG | HEIGHT: 67 IN

## 2024-09-04 DIAGNOSIS — N39.3 STRESS INCONTINENCE: ICD-10-CM

## 2024-09-04 DIAGNOSIS — Z30.09 BIRTH CONTROL COUNSELING: ICD-10-CM

## 2024-09-04 DIAGNOSIS — N89.8 VAGINAL DISCHARGE: ICD-10-CM

## 2024-09-04 DIAGNOSIS — Z30.013 INITIATION OF DEPO PROVERA: ICD-10-CM

## 2024-09-04 LAB — PREGNANCY TEST URINE, POC: NEGATIVE

## 2024-09-04 PROCEDURE — 87205 SMEAR GRAM STAIN: CPT

## 2024-09-04 PROCEDURE — 81025 URINE PREGNANCY TEST: CPT

## 2024-09-04 PROCEDURE — 96372 THER/PROPH/DIAG INJ SC/IM: CPT

## 2024-09-04 RX ORDER — MEDROXYPROGESTERONE ACETATE 150 MG/ML
150 INJECTION, SUSPENSION INTRAMUSCULAR ONCE
Status: COMPLETED | OUTPATIENT
Start: 2024-09-04 | End: 2024-09-04

## 2024-09-04 ASSESSMENT — EDINBURGH POSTNATAL DEPRESSION SCALE (EPDS)
I HAVE FELT SAD OR MISERABLE: NO, NOT AT ALL
I HAVE BEEN ABLE TO LAUGH AND SEE THE FUNNY SIDE OF THINGS: AS MUCH AS I ALWAYS COULD
I HAVE BEEN SO UNHAPPY THAT I HAVE BEEN CRYING: NO, NEVER
TOTAL SCORE: 8
THINGS HAVE BEEN GETTING ON TOP OF ME: YES, SOMETIMES I HAVEN'T BEEN COPING AS WELL AS USUAL
I HAVE BEEN ANXIOUS OR WORRIED FOR NO GOOD REASON: YES, SOMETIMES
I HAVE FELT SCARED OR PANICKY FOR NO GOOD REASON: NO, NOT AT ALL
I HAVE BEEN SO UNHAPPY THAT I HAVE HAD DIFFICULTY SLEEPING: YES, SOMETIMES
THE THOUGHT OF HARMING MYSELF HAS OCCURRED TO ME: NEVER
I HAVE BLAMED MYSELF UNNECESSARILY WHEN THINGS WENT WRONG: YES, SOME OF THE TIME
I HAVE LOOKED FORWARD WITH ENJOYMENT TO THINGS: AS MUCH AS I EVER DID

## 2024-09-04 NOTE — PROGRESS NOTES
"Subjective   28 y.o.  presenting for postpartum follow-up.     Delivery Date: 2024  Type of Delivery: Vaginal    Problem List       No episode was linked to this visit.            Concerns: Vaginal discharge, concerned that she may have BV.  Occasional itching, denies odor.     Some urinary incontinence with laughing, sneezing, coughing.     Pain: controlled  Lacerations: none  Lochia: Resolved.   Sexual Intimacy: Yes, two days ago, used condom.   Contraceptive Method: Depo Provera  Feeding Method:  Breast and formula.   no breast or nursing problems    Bonding with Baby: well with baby boy  Mood:   Postpartum Depression: Medium Risk (2024)    [place]  Depression Scale     Last EPDS Total Score: 8     Last EPDS Self Harm Result: Never   Follows with ROSARIO Recio.     Last pap: 3/2022, normal.     Objective    /69 (BP Location: Left arm, Patient Position: Sitting)   Ht 1.689 m (5' 6.5\")   Wt 98.9 kg (218 lb)   Breastfeeding Yes   BMI 34.66 kg/m²      Physical Exam  Constitutional:       Appearance: Normal appearance.   Genitourinary:      Vulva normal.      No vaginal discharge or bleeding.   HENT:      Head: Normocephalic.   Cardiovascular:      Rate and Rhythm: Normal rate and regular rhythm.   Pulmonary:      Effort: Pulmonary effort is normal.   Abdominal:      General: Abdomen is flat.      Palpations: Abdomen is soft.   Neurological:      Mental Status: She is alert and oriented to person, place, and time.   Skin:     General: Skin is warm and dry.   Psychiatric:         Mood and Affect: Mood normal.          Plan    Advised to call office for breast complaints, abnormal bleeding, mood changes, or other concerning symptoms.   Continue PNV daily for one year, or longer if breastfeeding for longer than 1 year.  Kegel exercises and pelvic floor exercises; pelvic floor PT referral placed.   Cleared to resume normal activity as desired.    Birth control -- Desires Depo today, still " "considering Mirena IUD.  Does not want Depo long term, states that \"it is just something to get me started.\"    Pregnancy test done and negative.  Importance of condoms for STI prevention reviewed. Condoms for contraception for the first 7 days after Depo.   Common discomforts and bleeding profile reviewed with patient, as well as possible delayed return of fertility after Depo discontinuation and decreased bone density while on the medication. Patient may consider calcium supplementation while on this medication.  Warning s/s reviewed and importance of next shot in 12 weeks discussed.    Discussed that IUDs have an unintended pregnancy rate of approximately 1%, similar to permanent sterilization. Mechanism of action and duration of therapy was discussed. Expected side effects of pain and irregular spotting for the first 3-6 months were reviewed. We talked about risks such as expulsion, migration, perforation, and PID with untreated gonorrhea/chlamydia at the time of insertion. STI testing prior to IUD insertion was offered. Safe sexual practices were encouraged.     Advised that patient remain abstinent for two weeks prior to IUD insertion date, as well as to take 600-800 mg. of ibuprofen prior to scheduled appointment.          Patient here today for Depo injection.    Last Depo: this is first injection  Next Depo due: 11/20-12/4  Last HERNANDO: PPV done today  UPT result (if done): neg  LMP: no period yet after delivery  Complaints with Depo: N/A  Patient or office supplied: office  Patient given Depo with no difficulties  Patient tolerated injection well  Education offered      Jacquelin Davis, DEWAYNE-JHON  "

## 2024-09-05 LAB
BACTERIAL VAGINOSIS VAG-IMP: NORMAL
CLUE CELLS VAG LPF-#/AREA: NORMAL /[LPF]
NUGENT SCORE: 4
YEAST VAG WET PREP-#/AREA: NORMAL

## 2024-09-11 ENCOUNTER — APPOINTMENT (OUTPATIENT)
Dept: PRIMARY CARE | Facility: CLINIC | Age: 29
End: 2024-09-11
Payer: COMMERCIAL

## 2024-09-12 DIAGNOSIS — Z71.89 ENCOUNTER FOR BREAST FEEDING COUNSELING: ICD-10-CM

## 2024-09-27 ENCOUNTER — APPOINTMENT (OUTPATIENT)
Dept: BEHAVIORAL HEALTH | Facility: CLINIC | Age: 29
End: 2024-09-27
Payer: COMMERCIAL

## 2024-09-27 DIAGNOSIS — Z86.59 HISTORY OF DEPRESSION: ICD-10-CM

## 2024-09-27 PROCEDURE — 90837 PSYTX W PT 60 MINUTES: CPT

## 2024-09-27 NOTE — PSYCHOTHERAPY
Baby getting carlos a new sleep schedule  Its leslie tricky, hard to focus on two kids  Mood check - feels fine, little tired    Said you're not going to like me. Depression    Boyfriend - doing well now, communicatin gmleticia.   Issue - both have other children in addition to the baby. BF has a son non-verbal, autistic  He visits, son is 7  He has 16-year-old too    Encourages him to go see her. Trying to be sensitive to the needs of other kids  His dtr - she thought it was weird due to age    Step-mom - don't want to over-step any boundary  Tries to be supportive. Called her step-mom for advice  Gave her step-mom a hard time, was 12   Didn't like her step-mom    Same time period sister passed away, a mess  Anniversary of ssiter's death, haven't healed, don't talk about    [facility]  Dtr asks a lot of questions  Sister  age 8,  of DM, kidneys failing, pneumonia    Didn't understand, never wanted to ask her mom, clueless  In and out of the hospital, one day just didn't come home  Remembers that day  Aunt screaming and crying  Mom did basics - cried and went home, didn't talk about her    Avoid talking abotu sister since it makes her mom cry  Never grieved her death    Had a  for sister, she had a different dad    Memories of sister - don't remember much  She wqs quiet, played w/ dolls, liked to draw on the wall  Has kept things of hers    She had to get her finger pricked a lot. Shared a room, would go get mom   She would scream, would be scary as if see scary things  Low noise,     Sister screaming -     Why so hyper-aware if baby is crying -   Can't talk to mom about sister    Deal w/ emotions alone, don't like to cry in front of people    When sister passed away, not normal mother/dtr relationship  Why talk, no one's going to listen  Brother was only 2 or 3  He was always with her - had to watch him    Brother has been vocal re his emotions re her   Brother sees her as  mother role, first bully  She didn't want him    Didn't know how to express herself - was mean an dugly to people  Genuinely feels bad.   Dad in an out of her life, had, confusing,    Shuts down, hard to feel emotions

## 2024-10-11 ENCOUNTER — APPOINTMENT (OUTPATIENT)
Dept: PRIMARY CARE | Facility: CLINIC | Age: 29
End: 2024-10-11
Payer: COMMERCIAL

## 2024-10-11 VITALS
DIASTOLIC BLOOD PRESSURE: 70 MMHG | SYSTOLIC BLOOD PRESSURE: 102 MMHG | WEIGHT: 215 LBS | TEMPERATURE: 97.5 F | HEART RATE: 68 BPM | HEIGHT: 67 IN | RESPIRATION RATE: 20 BRPM | BODY MASS INDEX: 33.74 KG/M2

## 2024-10-11 DIAGNOSIS — Z86.59 HISTORY OF DEPRESSION: ICD-10-CM

## 2024-10-11 DIAGNOSIS — Z00.00 ENCOUNTER FOR ANNUAL PHYSICAL EXAM: Primary | ICD-10-CM

## 2024-10-11 DIAGNOSIS — Z13.31 SCREENING FOR DEPRESSION: ICD-10-CM

## 2024-10-11 DIAGNOSIS — E66.9 OBESITY (BMI 30-39.9): ICD-10-CM

## 2024-10-11 PROBLEM — Z78.9 USES BIRTH CONTROL: Status: ACTIVE | Noted: 2024-10-11

## 2024-10-11 PROBLEM — O13.9 GESTATIONAL HYPERTENSION (HHS-HCC): Status: RESOLVED | Noted: 2024-07-26 | Resolved: 2024-10-11

## 2024-10-11 PROCEDURE — 99385 PREV VISIT NEW AGE 18-39: CPT | Performed by: FAMILY MEDICINE

## 2024-10-11 PROCEDURE — 96127 BRIEF EMOTIONAL/BEHAV ASSMT: CPT | Performed by: FAMILY MEDICINE

## 2024-10-11 RX ORDER — MEDROXYPROGESTERONE ACETATE 150 MG/ML
150 INJECTION, SUSPENSION INTRAMUSCULAR
COMMUNITY

## 2024-10-11 ASSESSMENT — ENCOUNTER SYMPTOMS
PALPITATIONS: 0
BLOOD IN STOOL: 0
DIFFICULTY URINATING: 0
SHORTNESS OF BREATH: 0
BRUISES/BLEEDS EASILY: 0
FEVER: 0
NAUSEA: 0
COUGH: 0
RHINORRHEA: 0
SORE THROAT: 0
BACK PAIN: 1
WHEEZING: 0
SEIZURES: 0
HEMATURIA: 0
DYSPHORIC MOOD: 1
VOMITING: 0
NERVOUS/ANXIOUS: 1
FATIGUE: 1
DIARRHEA: 0
ARTHRALGIAS: 0
CONSTIPATION: 0

## 2024-10-11 ASSESSMENT — PATIENT HEALTH QUESTIONNAIRE - PHQ9
1. LITTLE INTEREST OR PLEASURE IN DOING THINGS: NOT AT ALL
SUM OF ALL RESPONSES TO PHQ9 QUESTIONS 1 AND 2: 0
2. FEELING DOWN, DEPRESSED OR HOPELESS: NOT AT ALL

## 2024-10-11 NOTE — PROGRESS NOTES
"Subjective   Patient ID: Ni Loera is a 29 y.o. female who presents for Annual Exam (Px, pt declines flu shot).    HPI     Review of Systems   Constitutional:  Positive for fatigue. Negative for fever.   HENT:  Positive for hearing loss. Negative for congestion, ear pain, rhinorrhea and sore throat.    Eyes:  Negative for visual disturbance.        Sees eye doctor, possible glaucoma  Pt to [facility] appt for surveillance   Respiratory:  Negative for cough, shortness of breath and wheezing.    Cardiovascular:  Negative for chest pain and palpitations.   Gastrointestinal:  Negative for blood in stool, constipation, diarrhea, nausea and vomiting.   Endocrine: Positive for heat intolerance. Negative for cold intolerance.   Genitourinary:  Negative for difficulty urinating, hematuria, vaginal bleeding and vaginal discharge.   Musculoskeletal:  Positive for back pain. Negative for arthralgias.   Skin:  Negative for rash.   Neurological:  Negative for seizures and syncope.   Hematological:  Does not bruise/bleed easily.   Psychiatric/Behavioral:  Positive for dysphoric mood. Negative for suicidal ideas. The patient is nervous/anxious.         Pt is currently in counseling       Objective   /70   Pulse 68   Temp 36.4 °C (97.5 °F)   Resp 20   Ht 1.689 m (5' 6.5\")   Wt 97.5 kg (215 lb)   BMI 34.18 kg/m²     Physical Exam  Vitals and nursing note reviewed.   Constitutional:       General: She is not in acute distress.     Appearance: Normal appearance.   HENT:      Head: Normocephalic and atraumatic.      Right Ear: Tympanic membrane, ear canal and external ear normal.      Left Ear: Tympanic membrane, ear canal and external ear normal.      Nose: Nose normal.      Mouth/Throat:      Mouth: Mucous membranes are moist.      Pharynx: Oropharynx is clear.   Eyes:      Extraocular Movements: Extraocular movements intact.      Conjunctiva/sclera: Conjunctivae normal.      Pupils: Pupils are equal, round, and " reactive to light.   Cardiovascular:      Rate and Rhythm: Normal rate and regular rhythm.      Heart sounds: Normal heart sounds.   Pulmonary:      Effort: Pulmonary effort is normal.      Breath sounds: Normal breath sounds.   Abdominal:      General: Bowel sounds are normal.      Palpations: Abdomen is soft.      Tenderness: There is no right CVA tenderness or left CVA tenderness.   Musculoskeletal:         General: No deformity.      Cervical back: Neck supple. No tenderness.   Lymphadenopathy:      Cervical: No cervical adenopathy.   Skin:     General: Skin is warm and dry.   Neurological:      General: No focal deficit present.      Mental Status: She is alert.      Sensory: No sensory deficit.      Motor: No weakness.      Gait: Gait normal.   Psychiatric:         Mood and Affect: Mood normal.         Behavior: Behavior normal.         Assessment/Plan   Problem List Items Addressed This Visit             ICD-10-CM    History of depression Z86.59     Pt on no medicaiton,  is in counseling currently  Has been stable         Encounter for annual physical exam - Primary Z00.00    Relevant Orders    Follow Up In [facility] - PCP - Health Maintenance    CBC    Comprehensive Metabolic Panel    Lipid Panel    TSH with reflex to Free T4 if abnormal    Vitamin D 25-Hydroxy,Total    Screening for depression Z13.31    Obesity (BMI 30-39.9) E66.9     Pt is 3 months post partum  Advise healthy diet and exercise  Ho printed             Patient reported health Fair    Regular Dental Visits no    Regular Eye Visits yes    Hearing Loss  slight but may be from wax    Balanced Diet yes    Weight Concerns no    Exercise Irregular

## 2024-10-15 ENCOUNTER — APPOINTMENT (OUTPATIENT)
Dept: BEHAVIORAL HEALTH | Facility: CLINIC | Age: 29
End: 2024-10-15
Payer: COMMERCIAL

## 2024-10-28 ENCOUNTER — APPOINTMENT (OUTPATIENT)
Dept: BEHAVIORAL HEALTH | Facility: CLINIC | Age: 29
End: 2024-10-28
Payer: COMMERCIAL

## 2024-10-28 DIAGNOSIS — F41.9 ANXIETY: ICD-10-CM

## 2024-10-28 PROCEDURE — 90837 PSYTX W PT 60 MINUTES: CPT

## 2024-11-11 ENCOUNTER — APPOINTMENT (OUTPATIENT)
Dept: BEHAVIORAL HEALTH | Facility: CLINIC | Age: 29
End: 2024-11-11
Payer: COMMERCIAL

## 2024-11-18 ENCOUNTER — APPOINTMENT (OUTPATIENT)
Dept: BEHAVIORAL HEALTH | Facility: CLINIC | Age: 29
End: 2024-11-18
Payer: COMMERCIAL

## 2024-11-20 ENCOUNTER — APPOINTMENT (OUTPATIENT)
Dept: OBSTETRICS AND GYNECOLOGY | Facility: CLINIC | Age: 29
End: 2024-11-20
Payer: COMMERCIAL

## 2024-11-20 VITALS — SYSTOLIC BLOOD PRESSURE: 117 MMHG | DIASTOLIC BLOOD PRESSURE: 73 MMHG

## 2024-11-20 DIAGNOSIS — Z30.42 ENCOUNTER FOR SURVEILLANCE OF INJECTABLE CONTRACEPTIVE: ICD-10-CM

## 2024-11-20 PROCEDURE — 96372 THER/PROPH/DIAG INJ SC/IM: CPT

## 2024-11-20 RX ORDER — MEDROXYPROGESTERONE ACETATE 150 MG/ML
150 INJECTION, SUSPENSION INTRAMUSCULAR ONCE
Status: COMPLETED | OUTPATIENT
Start: 2024-11-20 | End: 2024-11-20

## 2024-11-20 NOTE — PROGRESS NOTES
Patient is here for Depo Provera IM injection  Last Depo was: 9/4/2024  Next Depo due:2/5/2025-2/19/2025  Last HERNANDO: 9/4/24 at [facility]  Due for next HERNANDO: 9/2025  UPT: N/A  LMP: Absent  Complaints: None  Pt given Depo Provera without difficulties.  Office provided injection.  Patient tolerated injection well.  Reviewed and approved by KIZZY PEREZ on 11/20/24 at 1:11 PM.

## 2024-11-25 ENCOUNTER — LAB (OUTPATIENT)
Dept: LAB | Facility: LAB | Age: 29
End: 2024-11-25
Payer: COMMERCIAL

## 2024-11-25 DIAGNOSIS — Z00.00 ENCOUNTER FOR ANNUAL PHYSICAL EXAM: ICD-10-CM

## 2024-11-25 LAB
25(OH)D3 SERPL-MCNC: 22 NG/ML (ref 30–100)
ALBUMIN SERPL BCP-MCNC: 4.2 G/DL (ref 3.4–5)
ALP SERPL-CCNC: 80 U/L (ref 33–110)
ALT SERPL W P-5'-P-CCNC: 28 U/L (ref 7–45)
ANION GAP SERPL CALC-SCNC: 10 MMOL/L (ref 10–20)
AST SERPL W P-5'-P-CCNC: 18 U/L (ref 9–39)
BILIRUB SERPL-MCNC: 0.4 MG/DL (ref 0–1.2)
BUN SERPL-MCNC: 11 MG/DL (ref 6–23)
CALCIUM SERPL-MCNC: 9.2 MG/DL (ref 8.6–10.3)
CHLORIDE SERPL-SCNC: 110 MMOL/L (ref 98–107)
CHOLEST SERPL-MCNC: 154 MG/DL (ref 0–199)
CHOLESTEROL/HDL RATIO: 4.2
CO2 SERPL-SCNC: 25 MMOL/L (ref 21–32)
CREAT SERPL-MCNC: 0.58 MG/DL (ref 0.5–1.05)
EGFRCR SERPLBLD CKD-EPI 2021: >90 ML/MIN/1.73M*2
ERYTHROCYTE [DISTWIDTH] IN BLOOD BY AUTOMATED COUNT: 13.6 % (ref 11.5–14.5)
GLUCOSE SERPL-MCNC: 89 MG/DL (ref 74–99)
HCT VFR BLD AUTO: 39 % (ref 36–46)
HDLC SERPL-MCNC: 37 MG/DL
HGB BLD-MCNC: 12.8 G/DL (ref 12–16)
LDLC SERPL CALC-MCNC: 102 MG/DL
MCH RBC QN AUTO: 28.6 PG (ref 26–34)
MCHC RBC AUTO-ENTMCNC: 32.8 G/DL (ref 32–36)
MCV RBC AUTO: 87 FL (ref 80–100)
NON HDL CHOLESTEROL: 117 MG/DL (ref 0–149)
NRBC BLD-RTO: 0 /100 WBCS (ref 0–0)
PLATELET # BLD AUTO: 270 X10*3/UL (ref 150–450)
POTASSIUM SERPL-SCNC: 4.4 MMOL/L (ref 3.5–5.3)
PROT SERPL-MCNC: 6.4 G/DL (ref 6.4–8.2)
RBC # BLD AUTO: 4.48 X10*6/UL (ref 4–5.2)
SODIUM SERPL-SCNC: 141 MMOL/L (ref 136–145)
TRIGL SERPL-MCNC: 75 MG/DL (ref 0–149)
TSH SERPL-ACNC: 1.04 MIU/L (ref 0.44–3.98)
VLDL: 15 MG/DL (ref 0–40)
WBC # BLD AUTO: 9.1 X10*3/UL (ref 4.4–11.3)

## 2024-11-25 PROCEDURE — 85027 COMPLETE CBC AUTOMATED: CPT

## 2024-11-25 PROCEDURE — 80061 LIPID PANEL: CPT

## 2024-11-25 PROCEDURE — 82306 VITAMIN D 25 HYDROXY: CPT

## 2024-11-25 PROCEDURE — 80053 COMPREHEN METABOLIC PANEL: CPT

## 2024-11-25 PROCEDURE — 84443 ASSAY THYROID STIM HORMONE: CPT

## 2024-11-25 PROCEDURE — 36415 COLL VENOUS BLD VENIPUNCTURE: CPT

## 2024-12-02 DIAGNOSIS — E55.9 VITAMIN D DEFICIENCY: ICD-10-CM

## 2024-12-02 RX ORDER — ERGOCALCIFEROL 1.25 MG/1
50000 CAPSULE ORAL WEEKLY
Qty: 12 CAPSULE | Refills: 0 | Status: SHIPPED | OUTPATIENT
Start: 2024-12-02 | End: 2025-02-24

## 2024-12-02 NOTE — TELEPHONE ENCOUNTER
----- Message from Wendie Goldman sent at 11/25/2024  5:35 PM EST -----  PT WITH LOW VIT D. START VIT D 50,000 international units  WEEKLY X 12 WK. THEN SWITCH TO OTC VIT D 400 international units  DAILY

## 2025-01-15 ENCOUNTER — APPOINTMENT (OUTPATIENT)
Dept: OBSTETRICS AND GYNECOLOGY | Facility: CLINIC | Age: 30
End: 2025-01-15
Payer: COMMERCIAL

## 2025-01-15 VITALS — WEIGHT: 221 LBS | DIASTOLIC BLOOD PRESSURE: 81 MMHG | BODY MASS INDEX: 35.14 KG/M2 | SYSTOLIC BLOOD PRESSURE: 120 MMHG

## 2025-01-15 DIAGNOSIS — L65.9 HAIR LOSS: ICD-10-CM

## 2025-01-15 DIAGNOSIS — Z30.09 BIRTH CONTROL COUNSELING: Primary | ICD-10-CM

## 2025-01-15 PROCEDURE — 87591 N.GONORRHOEAE DNA AMP PROB: CPT

## 2025-01-15 PROCEDURE — 1036F TOBACCO NON-USER: CPT

## 2025-01-15 PROCEDURE — 99214 OFFICE O/P EST MOD 30 MIN: CPT

## 2025-01-15 PROCEDURE — 87491 CHLMYD TRACH DNA AMP PROBE: CPT

## 2025-01-15 NOTE — PROGRESS NOTES
Subjective   Patient ID: Ni Loera is a 29 y.o. female who presents for Contraception.    HPI  Patient presents to the office today to discuss birth control.  Previously on Depo, though wants to switch.      Concern for hair loss, started after first round of Depo.  Not sure if related to Depo, states that she has also been very stressed -- Work, lifestyle.   Normal TSH in November.   Due for next Depo in Feb.     Due for pap; last pap 3/2022, normal.     Still breastfeeding.     Review of Systems   All other systems reviewed and are negative.      Objective   Physical Exam  Constitutional:       Appearance: Normal appearance.   HENT:      Head: Normocephalic.   Pulmonary:      Effort: Pulmonary effort is normal.   Skin:     General: Skin is warm and dry.   Neurological:      Mental Status: She is alert and oriented to person, place, and time.   Psychiatric:         Mood and Affect: Mood normal.         Assessment/Plan   Diagnoses and all orders for this visit:  Hair Loss        -    Discussed possible etiologies; TSH WNL recently.  Possibly PP.  Advised OTC Biotin supplement.  Referral to dermatology.  Birth control counseling  -     C. trachomatis / N. gonorrhoeae, Amplified    Discussed pills versus IUD.  Patient opts for Mirena IUD.     Discussed that IUDs have an unintended pregnancy rate of approximately 1%, similar to permanent sterilization. Mechanism of action and duration of therapy was discussed. Expected side effects of pain and irregular spotting for the first 3-6 months were reviewed. We talked about risks such as expulsion, migration, perforation, and PID with untreated gonorrhea/chlamydia at the time of insertion. STI testing prior to IUD insertion was offered and done today. Safe sexual practices were encouraged.     Advised that patient remain abstinent for two weeks prior to IUD insertion date, as well as to take 600-800 mg. of ibuprofen prior to scheduled appointment.    F/U for Mirena  IUD insert.     DEWAYNE Smith-JHON 01/15/25 2:41 PM

## 2025-01-16 LAB
C TRACH RRNA SPEC QL NAA+PROBE: NEGATIVE
N GONORRHOEA DNA SPEC QL PROBE+SIG AMP: NEGATIVE

## 2025-01-27 ENCOUNTER — APPOINTMENT (OUTPATIENT)
Dept: BEHAVIORAL HEALTH | Facility: CLINIC | Age: 30
End: 2025-01-27
Payer: COMMERCIAL

## 2025-01-27 DIAGNOSIS — F41.9 ANXIETY: ICD-10-CM

## 2025-01-27 DIAGNOSIS — F33.1 MODERATE EPISODE OF RECURRENT MAJOR DEPRESSIVE DISORDER: ICD-10-CM

## 2025-01-27 PROCEDURE — 90837 PSYTX W PT 60 MINUTES: CPT

## 2025-01-27 NOTE — PROGRESS NOTES
"Therapy Session  Progress Note    Session Type: Virtual    Start Time: 3:00 pm  End Time: 3:55 pm    Appointment: Scheduled    Reason for Visit: Depression, Anxiety    Patient Symptoms/Interval History: Feeling very stressed, anxious and overwhelmed. Having feelings of sadness and increased sleep in an effort to manage her feelings. Feels like she is \"losing her mind\" and doesn't feel stable. Denied having any suicidal or homicidal ideation. Went back to work in November after having her baby (he is now 6 months old). Having relationship issues with her boyfriend/FOB. Feels a lack of support and said he dismisses her feelings. Unable to identify anyone else she can open up to. Having conflict with her managers at work. Has felt triggered and felt a lack of respect, so she has had incidences of cussing her managers out. Not currently taking any medications for mental health reasons, but she is open to considering it. She is also open to considering attending the  IOP.    Mental Status: Alert & Oriented x3    Functional Status: No impairment    Interventions Provided: CBT, Review Progress/Goals, Referrals - Referral made to the  IOP    Progress Made: Minimum    Response to Interventions: The patient was receptive to the interventions provided.    Action Plan/Homework: A referral will be made to the  IOP. In the meantime, the patient will work on trying to manage her emotions if she gets triggered by things at her work.    Treatment Plan: Use CBT to help the patient gain awareness of unhelpful thought patterns and core beliefs. Help the patient learn healthy coping skills to manage depression and anxiety.     Follow Up/Next Appointment: Follow up in 2 weeks.      LYLE Solano, RENETTAS    **This visit was performed using real-time telehealth tools, including an audio/video connection between the patient and this provider. The patient provided consent for the individual telemedicine " service and understands the limitations of the visit.

## 2025-02-10 ENCOUNTER — APPOINTMENT (OUTPATIENT)
Dept: BEHAVIORAL HEALTH | Facility: CLINIC | Age: 30
End: 2025-02-10
Payer: COMMERCIAL

## 2025-02-10 ENCOUNTER — APPOINTMENT (OUTPATIENT)
Dept: OBSTETRICS AND GYNECOLOGY | Facility: CLINIC | Age: 30
End: 2025-02-10
Payer: COMMERCIAL

## 2025-02-10 ENCOUNTER — SOCIAL WORK (OUTPATIENT)
Dept: BEHAVIORAL HEALTH | Facility: CLINIC | Age: 30
End: 2025-02-10
Payer: COMMERCIAL

## 2025-02-10 DIAGNOSIS — F41.9 ANXIETY: ICD-10-CM

## 2025-02-10 DIAGNOSIS — F33.1 MODERATE EPISODE OF RECURRENT MAJOR DEPRESSIVE DISORDER: ICD-10-CM

## 2025-02-10 PROCEDURE — 90791 PSYCH DIAGNOSTIC EVALUATION: CPT | Mod: AJ,HE,GT | Performed by: SOCIAL WORKER

## 2025-02-12 NOTE — PROGRESS NOTES
Intensive Outpatient Program   Intake Assessment  Time        3:00PM  End         4:30PM      Name:         Ni Loera       :  1995      DEMOGRAPHICS   Gender: Female  Pronouns:  She/her   Sexual Orientation: Heterosexual  Race: White   Ethnicity:    Congregation/Spiritual Orientation: None  Primary Language: English    EMR TAB: “REASON FOR REFERRAL”   Referred to [facility] by and for what reason: Shara Lincoln  Depression: X  Recent Hospitalization:  Trauma:   Anxiety/Panic:  Bipolar/[place]:  Other:     EMR TAB: “CHIEF COMPLAINT”  CHIEF COMPLAINT SUMMARY: (presenting problem)   Include: name, age, race, gender, living situation, hx of diagnosis(es), r/o diagnosis(es), present stressors, current substance use, SI/HI and any other relevant clinical data.   Ni is a 29-year-old white  heterosexual woman with a history of depression and anxiety. Pt presents to [facility] after delivering her son in 2024 with symptoms of postpartum depression. She endorses pervasive depressed mood, irritability, isolation, frequent crying spells, and frequently using food (pattern of binging and restricting) and sleep as ways to cope ineffectively with her emotions. Pt also endorses anxious symptoms including occasional panic attacks and difficulty controlling worry. This is pt's second child- she has a daughter also who is 7 years old. Pt experienced PPD as well with her daughter. Present stressors include high conflict relationship with FOB, social isolation and stressors at work. Pt has a close relationship with her parents but struggles to ask them for help, and notes having no friends otherwise. Pt denies SI/HI and substance abuse. Trauma history includes pt's 8 year old sister dying suddenly when pt was 14 years old- pt feels she never processed the loss.    Accompanied to appointment by:   self       Pt. has given written permission to speak to the following regarding treatment in the [facility]  program:  Name:          Jihan Palomino       Relationship:        Mother       Phone #:   [phone]  Nearest ER: [facility]     Information in this assessment was obtained from the patient only: yes    EMR TAB: “HISTORY OF PRESENT ILLNESS”  What precipitated this referral? Present stressors?   “Could you explain in your own words what brings you here? (Current stressors or a recent event, hospitalization)   Has a history of PPD with last child. Currently feeling overwhelmed coping with transition to two children. Her son's father is not a supportive figure- she feels alone. She also doesn't tell people how she feels/about her mental health symptoms or treatment. Pregnancy was not planned. Pt endorsing both depressive and anxious symptoms.    - number of pregnancies - 2  Para- number of live children - 2 - daughter (7) named Giovana, son (6 months) Ariel  Date of delivery 2024  Tell me about your delivery experience - it was “peachy.” Only pushed for an hour and he came out. Delivery with daughter was challenging- ended up being suction cupped out.   Delivery/[place] complications - None  Is baby breast or formula fed? Both  What is your experience with that? She had an oversupply- using the stockpile now. She was more educated this round with breastfeeding. She enjoyed it. She was exhausted from exclusively pumping. Now not pumping- just using frozen milk supply and formula.  If postpartum what is contraception: Depo now and going to get an IUD later this month.    History of postpartum depression w/pregnancies? Yes - “it was horrible”. She would look at her and think, there's no way you're going to like me. Would feel like she couldn't do it. A lot of crying spells. Experience of delayed bonding.  Did you receive treatment for the postpartum depression?  No    Current Providers:    Psychiatrist:  Rhiannon Recio  Therapist:  Shara Lincoln - seeing her since she was pregnant     How was your mood  in previous pregnancies or post pregnancy?  Mood this pregnancy- sadness. FOB didn't want to keep the baby and they argued a lot. Cried a lot.    Previous Outpatient Treatment:  Therapist: Years ago- tried a lot of different providers. Started around  when her sister . [facility] and [facility].  Psychiatrist (or other med. mgmt.: None    Past Psych Hospitalizations (where, when and why): None  Past IOP/PHP Programs: None    Suicidal ideation:  No SI: X  SI without plan:       SI with plan:           Recent attempt:             Homicidal Ideation:  No HI: X  HI without plan:   HI with plan:    Current self-harming behavior:   Denies    EMR TAB: “PSYCHOSOCIAL HISTORIES”  PAST MEDICAL HISTORY:  Name of PCP: Wendie Goldman  Last time you had a physical: in the last couple of months  OBGYN/Midwife: Jacquelin Davis  Last seen: 2025    [place] Risk Assessment:   None; pt. denied.     Medical Conditions:    None    Hx of Surgeries?  Tonsils removed    Adaptive equipment used:   Eyeglasses    Pain evaluation:  Are you experiencing any physical pain right now?  No:      If yes explain: Yes  Delvalle-Baker Pain Rating Scale score:  How bad is it from 0 no pain to 10 the worst you ever had?  Score: 4  What reason and location is your pain? Back pain due to bad mattress, work  Are you currently on any pain regiment?  Yes:  X Tylenol PRN    No:        Is your pain adequately controlled?  Yes:      No: X       Allergies:  Bactrim    Current Medications:  Vitamin D supplement      FAMILY HISTORY: Collect this information in the most organic way for you   Does anyone in your family (immediate or extended) have a history of mental health diagnoses?  Mom - mental health (depression, bipolar), her maternal grandmother also had bipolar    Family involved in patients care:   On a scale from 0-10, how involved + supportive is your family in the care of you/ your family?   For what reasons did you choose this  number?  Mom and step dad are about an 8 or 9 but they throw it back in her face. She tries not to ask them for anything. They would be there if she needed anything.    Maternal Family History:  Living or : Living    Paternal Family History   Living or : Living    Siblings  10 siblings in total (mixture of half and step siblings)- ages are from 28 to 9    Hx of suicides on either side of the family?   “Do you have any family members or close people in your life who have completed suicide?”  Who & Method:   Yes - stepfather's brother and his stepdaughter (her cousin)    Children- pt is exploring the idea of her daughter going to counseling because FOB is not involved in her life, she is concerned about the impact on her                                                                                                                               Social History   Born:      ELIANE Glover                                                       Raised:   Lived in [place] for the first 14 years of her life and then for 8 years after that she lived in [place] and then moved back to [place]  What was it like growing up in your family?  Relationship with parents, siblings, etc./ Describe relationship with parents, peers/school etc. Review any MH fam hx. Endorsed in previous section.  Any supportive groups, organizations or communities that you are a part of?  Her mom kept her away from her dad for years and she's trying to get their relationship back on track. Childhood was okay- she was never bored. Age 13 her sister passed away (she was 10 years old)- after that it “wasn't normal”. She shut down. She hasn't spoke about it since it happened.     Present Living Situation:   Who lives in the home with you: pt and her two children and cat    Support persons? Doesn't talk to anyone other than her parents. Her relationship made her isolate herself. She used to have a handful of friends but doesn't speak with them  anymore.    Do you feel safe at home? Yes    Relationships  Any current partnerships or relationships? No- as of yesterday she broke up with FOB  Ex-Partner's name: Jenniffer    Any recent breakups? Have you ever been  before? If so, how long ago was the separation?   “He's a narcissist”. He blames her for everything and won't take accountability for anything. He fails to realize he needs help. Pt's mother doesn't like him because he never made an effort to get to know her. He has another baby mother who has a history of violence towards FOB. MIKE would ask for her phone from time to time- she felt it was a mind game. He was accusing her of cheating all the time. She gave him a visitation schedule- she feels this is more of a solid breakup this time. She denies any intimate partner violence or safety concerns. MIKE wanted her to abort their son and she didn't. Once she got pregnant their dynamic changed. She has another baby dad but he is not involved in their lives.    WORK HISTORY  Education Hx:   Year of High School graduation or GED earned: graduated in 2015  Learning Disabilities (Current/Past): None    Technical Training: Enjoi school about 2 years ago    Hx of  Service: No    **If still in Higher Education**  Are you currently attending classes? No  If they are on medical leave, first day of Medical Leave:    Work Hx:  Are you currently working?     [employer]  Occupation:   Work in [employer] center  Full Time:  X      Part Time:       How long have you worked there:   2 years  What do you enjoy about your work? What are your stressors at work?  She enjoys the feeling of being a girl in a man's work environment. Gives her a sense of empowerment. Stressors- they don't work with your schedule. She wants to move to a different department but they said no because she had low productivity, but that's because she was pregnant. Manager criticizes her for wearing a hoodie when it's  cold. Has a lot of male customers who are sexist towards her.     FMLA status:   Are you currently on FMLA? Are you planning on being on FMLA? No    Financial situation:  Poverty or below poverty income      RISK BEHAVIOR HISTORY  Past & Present Substance Hx:  Caffeine: Denies  Nicotine: Denies  Alcohol (type): Denies - her stepdad's family are all alcoholics so she always avoided alcohol. History of addiction in both sides of her family.  Marijuana: Denies  [place]/Ecstacy: Denies  Heroin: Denies  Opiates/Pain pills: Denies  Hallucinogens (LSD, mushrooms, synthetic): Denies  Stimulants/Cocaine: Denies  Over-the-counter or prescription meds (benzos): Denies  Other:     Style of Use:   Do you find it hard to just have one drink? Denies  Do you take a night off from alcohol or substance use? Denies  Do you use substances to cope with your mood or anxiety? Denies    Consequences of substance use:   Have you ever hid your use of alcohol or substances? Denies  How has your alcohol or substance use impacted your relationships? Denies  Do you have any physical or medical issues related to your substance use? (hangovers, disrupted sleep, headaches, etc.) Denies     Have you had any treatment for chemical dependency?    Denies    Any other addictive behaviors? (overeating, gambling, video games)  When she feels depressed she either eats way too much or doesn't eat at all. Overeating- a plate of pizza rolls, chips, ice cream, chocolate milk. Once or twice every two months. She also eats because she's bored. She's having some body image concerns and started restricting recently.     GUNS/FIREARMS  Do you own guns or have access to firearms? no    LEGAL HISTORY:  Do you have any past or present legal problems?    Pt. denies any legal history     Any history of disordered eating or eating disorder diagnosis?  No    EMR TAB: “NARRATIVE”  PAST PSYCH HX:    Past Trauma Treatment: None  Specific traumas experienced: losing sister  suddenly when sister was 8 and pt was 14 years old    Recent losses or deaths:   None    EMR TAB: “SCORES AND SCALES”  SCORES AND SCALES: Pt did not complete  STEVENSON  21:  GUMARO:  BDI:  PHQ-9:  MAST-DAST:  MDQ:  EPDS:      What barriers do you see interfering with your ability to attend IOP: pt will need a letter for her job to request a change in work schedule    Goals patient wants to work on while attending IOP:  Managing and coping with symptoms of postpartum depression and anxiety. Decrease irritability.     SYMPTOMS    Depressive symptoms reported:  Five (or more) of the following symptoms have been present during the same 2-week period:  Depressed for most of the day/ nearly every day X  Depression lasts how long?    Decreased interest in pleasure or interest in all, or almost all, activities most of the day, nearly everyday X  Appetite Disturbance (Weight Loss or Weight Gain) - lessened appetite  Sleep Disturbance  Trouble falling asleep  Trouble staying asleep  Sleeping a lot during the day - no but sleeping is a way she laina (avoidance)  Psychomotor retardation (feel like you're moving slow)   Psychomotor agitation (have to be moving all the time)  Fatigue or loss of energy nearly every day - yes  Feelings of worthlessness   Feelings of hopelessness    Feelings of guilt X  Diminished ability to think or concentrate   Difficulty making decisions X  Decreased self-esteem X  Indecisiveness  Pessimistic & negative attitude X  Crying spells X  Withdrawn or Social Isolating behavior - X     Anxiety Symptoms:  Anxiety X  Anxiety occurring more days than not X  Difficulty to control worry X  Feeling restless or on edge   Easily fatigued X  Difficulty concentrating or mind going blank  Irritability X  Muscle tension  Sleep disturbance (difficulty falling asleep/staying asleep, restless sleep)  X increase in sleeping to cope    Panic Attacks - Yes  How often? Twice in the last month    Physical Symptoms of  panic:    Palpitations/pounding heart/accelerated heart rate  Sweating   Trembling or shaking  X  Shortness of breath X  Feelings of choking   Chest pain or discomfort   Nausea or abdominal distress  Feeling dizzy, unsteady, or lightheaded   Chills or heat sensations   Numbness or tingling sensations   Feeling detached from oneself or from reality  Fear of losing control   Fear of death or dying     Manic symptoms reported:  Denies all    Psychotic symptoms:  Denies all    EMR TAB: “MENTAL STATUS EXAM”  General appearance & grooming: Appropriate      Motor activity:  Appropriate       Behavior: Cooperative       Adaptive Equipment: Eyeglasses  Speech: Appropriate     Clear        Mood: Anxious       Affect: Mood Congruent Appropriate     Normal range       Thought process:  Appropriate     Logical       Thought content: Appropriate       Cognition: No impairment, Orientation: Alert & Oriented x 3  Insight:  Aware of problem       Eye contact: Average       Judgment: Judgment intact       Interview behavior: Appropriate       Hallucinations: None       Delusions: Absent         EMR TAB: “PATIENT DISCUSSION/SUMMARY”  PLAN:  Pt. presents with signs and symptoms of postpartum depression and anxiety.  Pt. was educated about the IOP program and enrollment was recommended. Pt. is willing to attend IOP. Pt. denies SI/HI at this time, not judged to be a risk to self or others. Pt. insurance information has been verified. Pt. will begin IOP on 2/17/2025.     SANDY Kirkpatrick  , [facility]    SAFE-T Protocol with C-SSRS - Recent- If first two questions are no, skip to grey/shaded box (after 5).     Step 1: Identify Risk Factors                                                   C-SSRS Suicidal Ideation Severity Month   Wish to be dead  Have you wished you were dead or wished you could go to sleep and not wake up? no   Current suicidal thoughts  Have you actually had any thoughts of killing yourself?  "no   Suicidal thoughts w/ Method (w/no specific Plan or Intent or act)  Have you been thinking about how you might do this? no   Suicidal Intent without Specific Plan  Have you had these thoughts and had some intention of acting on them? no   Intent with Plan  Have you started to work out or worked out the details of how to kill yourself? Do you intend to carry out this plan? no   C-SSRS Suicidal Behavior: \"Have you ever done anything, started to do anything, or prepared to do anything to end your life?”    Examples: Collected pills, obtained a gun, gave away valuables, wrote a will or suicide note, took out pills but didn't swallow any, held a gun but changed your mind or it was grabbed from your hand, went to the roof but didn't jump; or actually took pills, tried to shoot yourself, cut yourself, tried to hang yourself, etc.  If “YES” Was it within the past 3 months? Lifetime    no    Past 3 Months    no   Current and Past Psychiatric Dx:   ? Mood Disorder  ? Psychotic disorder   ? Alcohol/substance abuse disorders   ? PTSD  ? ADHD  ? TBI  ? Cluster B Personality disorders or traits (i.e., Borderline, Antisocial, Histrionic & Narcissistic)   ? Conduct problems (antisocial behavior, aggression, impulsivity)  ? Recent onset    Presenting Symptoms:   ? Anhedonia   ? Impulsivity   ? Hopelessness or despair   ? Anxiety and/or panic   ? Insomnia   ? Command hallucinations   ? Psychosis  Family History:   ? Suicide   ? Suicidal behavior  ? Axis I psychiatric diagnoses requiring hospitalization    Precipitants/Stressors:  ? Triggering events leading to humiliation, shame, and/or despair (e.g. Loss of relationship, financial or health status) (real or anticipated)  ? Chronic physical pain or other acute medical problem (e.g. CNS disorders)   ? Sexual/physical abuse   ? Substance intoxication or withdrawal   ? Pending incarceration or homelessness   ? Legal problems   ? Inadequate social supports   ? Social isolation  ? " Perceived burden on others     Change in treatment:  ? Recent inpatient discharge  ? Change in provider or treatment (i.e.,      medications, psychotherapy, milieu)  ? Hopeless or dissatisfied with provider or treatment   ? Non-compliant or not receiving treatment    ? Access to lethal methods: Ask specifically about presence or absence of a firearm in the home or ease of accessing      Step 2: Identify Protective Factors (Protective factors may not counteract significant acute suicide risk factors)   Internal:   ? Ability to cope with stress  ? Frustration tolerance  ? Evangelical beliefs   ? Fear of death or the actual act of killing self  ? Identifies reasons for living   External:   ? Cultural, spiritual and/or moral attitudes against suicide  ? Responsibility to children  ? Beloved pets  ? Supportive social network of family or friends  ? Positive therapeutic relationships  ? Engaged in work or school     Step 3: Specific questioning about Thoughts, Plans, and Suicidal Intent - (see Step 1 for Ideation Severity and Behavior)  If semi-structured interview is preferred to complete this section, clinicians may opt to complete C-SSRS Lifetime/Recent for comprehensive behavior/lethality assessment.    C-SSRS Suicidal Ideation Intensity (with respect to the most severe ideation 1-5 identified above) Month   Frequency  How many times have you had these thoughts?   (1) Less than once a week    (2) Once a week   (3)  2-5 times in week    (4) Daily or almost daily    (5) Many times each day    Duration  When you have the thoughts how long do they last?  (1) Fleeting - few seconds or minutes                                                   (4) 4-8 hours/most of day  (2) Less than 1 hour/some of the time                                                 (5) More than 8 hours/persistent or continuous  (3) 1-4 hours/a lot of time    Controllability  Could/can you stop thinking about killing yourself or wanting to die if you  want to?  (1) Easily able to control thoughts                                                        (4) Can control thoughts  with a lot of difficulty  (2) Can control thoughts with little difficulty                                      (5) Unable to control thoughts  (3) Can control thoughts with some difficulty                                    (0) Does not attempt to control thoughts    Deterrents  Are there things - anyone or anything (e.g., family, Baptist, pain of death) - that stopped you from wanting to die or acting on thoughts of suicide?  (1) Deterrents definitely stopped you from attempting suicide            (4) Deterrents most likely did not stop you   (2) Deterrents probably stopped you                                                        (5) Deterrents definitely did not stop you   (3) Uncertain that deterrents stopped you                                               (0) Does not apply    Reasons for Ideation  What sort of reasons did you have for thinking about wanting to die or killing yourself?  Was it to end the pain or stop the way you were feeling (in other words you couldn't go on living with this pain or how you were feeling) or was it to get attention, revenge or a reaction from others? Or both?  (1) Completely to get attention, revenge or a reaction from others       (4) Mostly to end or stop the pain (you couldn't go on  (2) Mostly to get attention, revenge or a reaction from others                     living with the pain or how you were feeling)  (3) Equally to get attention, revenge or a reaction from others               (5) Completely to end or stop the pain (you couldn't go on          and to end/stop the pain                                                                         living with the pain or  how you were feeling)                                                                                                                                (0)  Does not apply     Total Score    Step 4: Guidelines to Determine Level of Risk and Develop Interventions to LOWER Risk Level  “The estimation of suicide risk, at the culmination of the suicide assessment, is the quintessential clinical judgment, since no study has identified one specific risk factor or set of risk factors as specifically predictive of suicide or other suicidal behavior.”   From The American Psychiatric Association Practice Guidelines for the Assessment and Treatment of Patients with Suicidal Behaviors, page 24.   RISK STRATIFICATION TRIAGE   High Suicide Risk  ?? Suicidal ideation with intent or intent with plan in past month (C-SSRS Suicidal Ideation #4 or #5)  Or  ?? Suicidal behavior within past 3 months (C-SSRS Suicidal Behavior) Initiate local psychiatric admission process  Stay with patient until transfer to higher level of care is complete  Follow-up and document outcome of emergency psychiatric evaluation   Moderate Suicide Risk  ?? Suicidal ideation with method, WITHOUT plan, intent or behavior       in past month (C-SSRS Suicidal Ideation #3)  Or  ?? Suicidal behavior more than 3 months ago (C-SSRS Suicidal Behavior Lifetime)  Or  ?? Multiple risk factors and few protective factors Directly address suicide risk, implementing suicide prevention strategies  Develop Safety Plan     Low Suicide Risk  ?? Wish to die or Suicidal Ideation WITHOUT method, intent, plan or behavior (C-SSRS Suicidal Ideation #1 or #2)   Or  ??  Modifiable risk factors and strong protective factors  Or  ?  No reported history of Suicidal Ideation or Behavior Discretionary Outpatient Referral     Step 5: Documentation   Risk Level :  [ ] High Suicide Risk  [ ] Moderate Suicide Risk  [ ] Low Suicide Risk   Clinical Note:    Your Clinical Observation  Relevant Mental Status Information  Methods of Suicide Risk Evaluation    Brief Evaluation Summary  Warning Signs  Risk Indicators  Protective Factors  Access to Lethal Means  Collateral  Sources Used and Relevant Information Obtained  Specific Assessment Data to Support Risk Determination  Rationale for Actions Taken and Not Taken    Provision of Crisis Line [phone]([phone])  Implementation of Safety Plan (If Applicable)

## 2025-02-14 ENCOUNTER — APPOINTMENT (OUTPATIENT)
Dept: BEHAVIORAL HEALTH | Facility: CLINIC | Age: 30
End: 2025-02-14
Payer: COMMERCIAL

## 2025-02-17 ENCOUNTER — TELEMEDICINE (OUTPATIENT)
Dept: BEHAVIORAL HEALTH | Facility: CLINIC | Age: 30
End: 2025-02-17
Payer: COMMERCIAL

## 2025-02-17 ENCOUNTER — APPOINTMENT (OUTPATIENT)
Dept: BEHAVIORAL HEALTH | Facility: CLINIC | Age: 30
End: 2025-02-17
Payer: COMMERCIAL

## 2025-02-17 DIAGNOSIS — F33.1 MODERATE EPISODE OF RECURRENT MAJOR DEPRESSIVE DISORDER: ICD-10-CM

## 2025-02-17 DIAGNOSIS — F41.9 ANXIETY: ICD-10-CM

## 2025-02-17 PROCEDURE — 90853 GROUP PSYCHOTHERAPY: CPT | Mod: AJ,HE,GT | Performed by: SOCIAL WORKER

## 2025-02-17 PROCEDURE — 99214 OFFICE O/P EST MOD 30 MIN: CPT | Performed by: STUDENT IN AN ORGANIZED HEALTH CARE EDUCATION/TRAINING PROGRAM

## 2025-02-17 NOTE — GROUP NOTE
Group Topic: Coping Skills   Group Date: 2/17/2025  Start Time: 11:00 AM  End Time: 12:00 PM  Facilitators: IFRAH Michael   Department: [facility]        Name: Ni Loera YOB: 1995   MR: [id number]      This was a video IOP group on a HIPAA compliant platform. All patients were provided with informed consent.  Date: 02/17/15, Time: 11am-12pm group, Number of Patients:5, User Name: IFRAH johnston  Number of Staff: 1    Group topic(s): Managing negative thoughts with CBT skills   Provided psychoeducation on CBT and how our thoughts impact our behaviors. Group members asked to list any behaviors they currently concerned about or have changed since becoming pregnant or having their baby. Patient asked to identify small changes they could make to these behaviors. Discussed the importance of pleasurable activities, socializing and physical activity. Patient sharing the challenges of being a single parent. Ni was present, attentive.     Facilitator: IFRAH Pineda

## 2025-02-17 NOTE — GROUP NOTE
Group Topic: Coping Skills   Group Date: 2/17/2025  Start Time: 12:00 PM  End Time:  1:00 PM  Facilitators: IFRAH Michael   Department: [facility]        Name: Ni Loera YOB: 1995   MR: [id number]      This was a video IOP group on a HIPAA compliant platform. All patients were provided with informed consent.     Date: 02/17/25, Time: 12pm-1:00pm group, Number of Patients: 5, User Name: [email or web address]  Number of Staff: 1    Group topic(s): Managing awful/negative thoughts   Provided psychoeducation on common negative thoughts. Group discussing regarding feelings of shame and guilt. Reviewed ways to challenge the truth of negative thoughts including relabeling and reattributing thoughts. Spoke with patient about the cycle of depression and anxiety. Ni shared feeling guilty that MIKE isn't involved in her daughters life.     Facilitator: IFRAH Pineda

## 2025-02-17 NOTE — PROGRESS NOTES
Subjective    All Individuals Present: Patient and Provider (Encounter Provider)     ID: Ni Loera is a 29 y.o. female presenting to [facility].     Interval History/HPI/PFSH:    6 months PP. No longer breastfeeding    Initially wanted to avoid meds as a last resort, felt like she was in denial.    More recently has had more crying turning into anger outbursts, trembling/tense. Has been getting worse since January. Has been more isolative and socially withdrawn.    Has had anxiety and depression since 14 yo. Older daughter turns 7 yo this year, is particularly triggering given that her sister  at 7 yo (pt was 12 yo).    Worries about ADHD. Can't focus on singular thing, often forgetful, has always been the case, present since childhood. Dad has it and his other children do as well.    Denies SI/HI/AVH.       Medication side effects: Not Applicable     Review of Systems  Constitutional: Positive for fatigue  Psychiatric: Positive for anxiety, depression, irritability, loss of interest in favorite activities, sleep disturbance, and socially withdrawn, Negative for aggressive behavior  Neurological: Negative   Other: 6 months PP    Objective   There were no vitals taken for this visit.  Wt Readings from Last 4 Encounters:   01/15/25 100 kg (221 lb)   10/11/24 97.5 kg (215 lb)   24 98.9 kg (218 lb)   24 102 kg (225 lb 9.6 oz)       Mental Status Exam  General Appearance: Well groomed, appropriate eye contact.  Attitude/Behavior: Cooperative, conversant, engaged, and with good eye contact.  Motor: Psychomotor retardation.  Speech: Normal rate, volume, prosody  Gait/Station: Within normal limits  Mood: depressed, anxious.  Affect: Dysphoric, constricted but reactive, Anxious, and Congruent with mood and topic of conversation  Thought Process: Linear, goal directed  Thought Associations: No loosening of associations  Thought Content: normal  Sensorium: Alert and oriented to person, place, time and  situation  Insight: Intact, as evidenced by awareness of symptom patterns  Judgment: Intact  Cognition: Attention: Mild impairment in attention, Fund of Knowledge: Good, and Language: Naming intact  Testing: N/A.    Laboratory/Imaging/Diagnostic Tests       Assessment/Plan   Overall Formulation and Differential Diagnosis:  Ni Loera is a 29 y.o. female who meets criteria for MDD and HUMA< with peripartum exacerbation.    Interval Assessment:  Coming to IOP to bolster skills in managing mood and anxiety. Would prefer to use psychotherapy exclusively, but open to medications if not fully remitted    Plan:  -currently defers medications, wants to see what responds to group but willing to try if needed  -Have reviewed intake assessment performed by Aretha Pereira and certify its validity.  -Pt. has the physical capacity to participate and tolerate the interventions provided in the IOP schedule and curriculum.  -Admit to IOP for up to 4 days/week or 12 hours/week starting on 2/17/25.    Risk Assessment:  Imminent Risk of Suicide or Serious Self-Injury: Low Risk -- Risk factors include: Depression, Loss (relational, social, occupational, financial) , Sense of isolation , and Severe anxiety Protective factors include:Denies current suicidal ideation, Denies history of suicide attempts , Future-oriented talk , Willingness to seek help and support , and Restricted access to firearms or other lethal means of suicide   Imminent Risk of Violence or Homicide: Low Risk - Risk factors include: No significant risk factors identified on screening. Protective factors include: Lack of known history of harm to others , Lack of known history of violent ideation , and Lack of known access to firearms   Treatment Plan:  There are no recently modified care plans to display for this patient.      Attestation Statements   Topics (in addition those noted above): Diagnostic impressions, Importance of adherence to treatment , Instructions  for treatment , Patient education , Prognosis , Risks and benefits of treatments , and Risk factor reduction

## 2025-02-18 ENCOUNTER — APPOINTMENT (OUTPATIENT)
Dept: BEHAVIORAL HEALTH | Facility: CLINIC | Age: 30
End: 2025-02-18
Payer: COMMERCIAL

## 2025-02-18 DIAGNOSIS — F33.1 MODERATE EPISODE OF RECURRENT MAJOR DEPRESSIVE DISORDER: ICD-10-CM

## 2025-02-18 DIAGNOSIS — F41.9 ANXIETY: ICD-10-CM

## 2025-02-18 PROCEDURE — 90853 GROUP PSYCHOTHERAPY: CPT | Mod: AJ,HE,GT | Performed by: SOCIAL WORKER

## 2025-02-18 NOTE — GROUP NOTE
Group Topic: Coping Skills   Group Date: 2/18/2025  Start Time: 10:00 AM  End Time: 11:00 AM  Facilitators: IFRAH Michael   Department: [facility]        Name: Ni Loera YOB: 1995   MR: [id number]      This was a video IOP group on a HIPAA compliant platform. All patients were provided with informed consent.  Date: 02/18/25, Time: 10am-11am group, Number of Patients:5, User Name: IFRAH johnston  Number of Staff: 1    Group topic(s): Attachment and Bonding   Provided psychoeducation on attachment and bonding. Discussed why bonding it's important and barriers (anxiety, depression, work etc). Group discussion on societal expectations of women and it's impact. Reviewed activities that promote bonding and attachment. Patient's asked to identify one thing they could try with their children. 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 Ni was present, attentive.     Facilitator: IFRAH Pineda

## 2025-02-18 NOTE — GROUP NOTE
"Group Topic: Feeling Awareness/Expression   Group Date: 2/18/2025  Start Time: 11:00 AM  End Time: 12:00 PM  Facilitators: SANDY Kirkpatrick   Department: [facility]    Number of Participants: 5   Group Focus: family and feeling awareness/expression  Treatment Modality: Psychoeducation  Interventions utilized were group exercise and patient education  Purpose: communication skills and insight or knowledge    This was a video IOP group on a HIPAA compliant platform. All patients were provided with informed consent.     Date: 2/18/2025, Time: 11a-12p, Number of Patients: 5, User Name: [email or web address],  Number of Staff: 1  Group topic(s): Mom Power. Patients engaged in \"common ground\" ice breaker exercise and were introduced to the Mom Power concepts and goals. Reviewed \"early foundations\" handout and watched a video about \"the tree.\" Watched a video of an interaction between a mother and child to illustrate concepts.    SANDY Rodriguez-S      Name: Ni Loera YOB: 1995   MR: [id number]      Ni shared about her older child coping with the transition to having a sibling and feeling uncertain sometimes how to help her manage her feelings about it.  "

## 2025-02-18 NOTE — GROUP NOTE
"Group Topic: Feeling Awareness/Expression   Group Date: 2/18/2025  Start Time: 12:00 PM  End Time:  1:00 PM  Facilitators: SANDY Kirkpatrick   Department: [facility]    Number of Participants: 5   Group Focus: family and feeling awareness/expression  Treatment Modality: Psychoeducation  Interventions utilized were exploration, group exercise, patient education, and story telling  Purpose: feelings and insight or knowledge      This was a video IOP group on a HIPAA compliant platform. All patients were provided with informed consent.     Date: 2/18/2025, Time: 12p-1p, Number of Patients: 5, User Name: [email or web address],  Number of Staff: 1  Group topic(s): Mom Power, continued. Patients watched a video of a parent and child interacting to learn more about tree concepts/behaviors when a child is exploring. Patients also discussed the impact of past trauma on their parenting and completed an exercise to identify the messages they want to pass on to their children and ones they don't want to pass on. Session ended with guided visualization (\"container\") exercise.    SANDY Rodriguez-S    Name: Ni Loera YOB: 1995   MR: [id number]      Ni shared about the impact of her sister's death on her family when she was growing up. Pt shared she fears not being present enough for her children's emotional needs. She shared wanting to increase her closeness with her older child.  "

## 2025-02-20 ENCOUNTER — APPOINTMENT (OUTPATIENT)
Dept: BEHAVIORAL HEALTH | Facility: CLINIC | Age: 30
End: 2025-02-20
Payer: COMMERCIAL

## 2025-02-20 DIAGNOSIS — F33.1 MODERATE EPISODE OF RECURRENT MAJOR DEPRESSIVE DISORDER: ICD-10-CM

## 2025-02-20 DIAGNOSIS — F41.9 ANXIETY: ICD-10-CM

## 2025-02-20 PROCEDURE — 90853 GROUP PSYCHOTHERAPY: CPT | Mod: AJ,HE,GT | Performed by: SOCIAL WORKER

## 2025-02-20 NOTE — GROUP NOTE
Group Topic: Goals   Group Date: 2/20/2025  Start Time: 12:00 PM  End Time:  1:00 PM  Facilitators: SANDY Kirkpatrick   Department: [facility]    Number of Participants: 6   Group Focus: goals  Treatment Modality: Cognitive Behavioral Therapy  Interventions utilized were group exercise  Purpose: coping skills and self-care    This was a video IOP group on a HIPAA compliant platform. All patients were provided with informed consent.     Date: 2/20/2025, Time: 12p-1, Number of Patients: 6, User Name: [email or web address],  Number of Staff: 1  Group topic(s): Weekend goal setting. Patients set SMART goals surrounding enjoyment, achievement and closeness to others. Also discussed times they anticipate being vulnerable during the weekend to increased symptoms and coping strategies/supports.    IFRAH Rodriguez      Name: Ni Loera YOB: 1995   MR: [id number]      Ni stated she is going to take time off work in order to do IOP. She plans to do her daughter's hair, do laundry and do a craft with her children. She will use coping skills such as self-compassion and giving herself time to complete tasks where she anticipates she will feel anxious or irritable.

## 2025-02-21 ENCOUNTER — DOCUMENTATION (OUTPATIENT)
Dept: BEHAVIORAL HEALTH | Facility: CLINIC | Age: 30
End: 2025-02-21
Payer: COMMERCIAL

## 2025-02-21 NOTE — PROGRESS NOTES
INTENSIVE OUTPATIENT PROGRAM MULTIDISCIPLINARY  TREATMENT PLAN & PROGRESS NOTE     Patient: Ni Loera   :1995 Age:29    Student: No  Treatment Team Date:25    MRN#[id number]    Psychiatrist: Dr. David Jones  Date of IOP Admission: 25  Ref by: Shara Lincoln           Week  1                     Admission Scores:   STEVENSON 21:  GUMARO:  BDI:   PHQ-9:  MAST: DAST: MDQ:   [place]:     Current Risk Assessment:  Suicidal Risk:      None: X    Ideation:       Plan:      Intent:      SI Plan with plan contracts for safety:     Hx of harming self:        Homicidal Risk:  None:  X Ideation:       Plan:      Intent:      HI Plan with means:                                     Hx of harming others:          Global Improvement    Clinical Global Impressions Rating Scale Of Illness:  5  1-No Illness Present   2-Minimal   3-Mild   4-Moderate   5-Marked   6-Severe  X 7-Very Severe     Diagnoses & ICD-10 Codes F32.1  Primary Diagnosis & Code: Moderate episode of recurrent major depressive disorder     Secondary Diagnosis & Code:   Psychosocial & Environmental Stressors:   Financial  insecurity :   Medication  Family/Home life: X  Work/School:X    Inadequacy of social support      Patient's Treatment Goals:            Date Initiated:            Progress Update:  2025  1.  Attend and actively participate in IOP _3_ days/week for 3 hours per day   2025 Good  X Fair    Poor   Unclear   2.  Attend weekly medication management sessions and maintain compliance of medications  2025   Good  X  Fair     Poor     Unclear                                                        2.  Identify symptoms of diagnoses and develop coping plans    2025  Good X   Fair     Poor     Unclear                                                 3.  Increase illness education and symptom insight                  2025 Good  X  Fair     Poor     Unclear        Current medications:  See EMR chart         Progress note:  _X_New to the IOP program, attending as planned  __Compliant, Progressing & Improving - Needs more time in IOP therapy program   __Compliant, Progressing & Improving Planning Discharge   __Compliant, Not Progressing or Improving: Reason  __Non-Compliant, not progressing or improving: Plan  __Other: Patient is progressing well in the program        Insurance & Benefits:  Name of Insurance: [employer] Yes     BENEFITS ARE BASED ON MEDICAL NECESSITY ONLY: Unlimited visits, covers 100% of the contracted rate after deductible has been met.     Co-Pay per day: $0    DEDUCTIBLE        Single:  / Family: / Accumulation:   Single:   / Family:  /  CO- INSURANCE  Single:  / Family:  / Accumulation:  Single:  /  Family: /    OUT OF POCKET  Single:  / Family:  / Accumulation:  Single: /  Family: /     Total IOP Sessions completed & authorized to date:  3    Discharge plans have been discussed with patient & Dr. AHMET Jones on:   Reason for discharge:    ___Successfully completed IOP treatment:   ___Pt. decided to seek treatment elsewhere:   ___Dropped out or no show more than three times:  ___Benefits exhausted:   ___Other:    Discharge date:                   Discharge Prognosis: Excellent      Good     Fair     Poor    Follow up appointment with: Physician:   Follow up appointment with: Therapist:    Follow up Aftercare group?  Yes __   No__     Patient provided with Crisis Hotline phone number for suicide prevention? Yes __ No __    Discharge Scores:   STEVENSON 21:  GUMARO:  BDI:   PHQ-9:  MAST: DAST: MDQ:   [place]:    Treatment plan electronically signed by Treatment Team:   ALEE Jones MD, IFRAH Menard J. Wyse, LISW, K. Fogarty, PsyD

## 2025-02-21 NOTE — GROUP NOTE
"Group Topic: Feeling Awareness/Expression   Group Date: 2/20/2025  Start Time: 10:00 AM  End Time: 11:00 AM  Facilitators: Kena Dacosta PsyD   Department: [facility]    Number of Participants: 7   Group Focus: other self-compassion  Treatment Modality: Psychoeducation  Interventions utilized were patient education  Purpose: feelings    This was a video IOP group on a HIPAA compliant platform. All patients were provided with informed consent.    Group topic: Self-compassion  Group members shared what they are hoping to get out of IOP as an ice breaker to welcome new patients. They engaged in self-compassion meditation and began to explore what has influenced their self-compassion throughout their lives.   Kena Dacosta Psy.D.        Name: Ni Loera YOB: 1995   MR: [id number]      Ni was present and actively engaged in discussion. She stated that she \"doesn't have self-compassion. She discussed why self-compassion has been difficult for her.         "

## 2025-02-21 NOTE — GROUP NOTE
Group Topic: Feeling Awareness/Expression   Group Date: 2/20/2025  Start Time: 11:00 AM  End Time: 12:00 PM  Facilitators: Kena Dacosta PsyD   Department: [facility]    Number of Participants: 6   Group Focus: other self-compassion  Treatment Modality: Psychoeducation  Interventions utilized were patient education  Purpose: feelings      This was a video IOP group on a HIPAA compliant platform. All patients were provided with informed consent.    Group topic: self-compassion  Group members received education on the components of self-compassion. They shared how they practice self-kindness as well as ways in which they engage in self-judgement. They worked toward reframing harsh and critical self-talk.   Kena Dacosta Psy.D.            Name: Ni Loera YOB: 1995   MR: [id number]      Ni was present and actively engaged in discussion. She reported that she struggles with comparisons. She explored ways to change her judgements to be kinder to herself.

## 2025-02-24 ENCOUNTER — APPOINTMENT (OUTPATIENT)
Dept: BEHAVIORAL HEALTH | Facility: CLINIC | Age: 30
End: 2025-02-24
Payer: COMMERCIAL

## 2025-02-24 DIAGNOSIS — F33.1 MODERATE EPISODE OF RECURRENT MAJOR DEPRESSIVE DISORDER: ICD-10-CM

## 2025-02-24 DIAGNOSIS — F41.9 ANXIETY: ICD-10-CM

## 2025-02-24 PROCEDURE — 90853 GROUP PSYCHOTHERAPY: CPT | Mod: AJ,HE,GT | Performed by: SOCIAL WORKER

## 2025-02-24 NOTE — GROUP NOTE
Group Topic: Coping Skills   Group Date: 2025  Start Time: 11:00 AM  End Time: 12:00 PM  Facilitators: IFRAH Michael   Department: [facility]        Name: Ni Loera YOB: 1995   MR: [id number]      This was a video IOP group on a HIPAA compliant platform. All patients were provided with informed consent.  Date: 25, Time: 11am-12pm group, Number of Patients:4, User Name: IFRAH johnston  Number of Staff: 1    Group topic(s):  Mood and Anxiety disorder   Provided psychoeducation on PMAD and Baby blues. Discussed signs that symptoms may be interfering with daily functioning.  Patient asked to identify symptoms that they have experienced in the past two weeks. Patient sharing what this experience was like for them. Reviewed daily onset of symptoms. Patient shared that how suppressed grief has impacted her pregnancy. Ni was present, attentive.     Facilitator: IFRAH Pineda

## 2025-02-24 NOTE — GROUP NOTE
Group Topic: Coping Skills   Group Date: 2025  Start Time: 12:00 PM  End Time:  1:00 PM  Facilitators: IFRAH Michael   Department: [facility]        Name: Ni Loera YOB: 1995   MR: [id number]      This was a video IOP group on a HIPAA compliant platform. All patients were provided with informed consent.     Date: 25, Time: 12pm-1:00pm group, Number of Patients: 5, User Name: [email or web address]  Number of Staff: 1    Group topic(s): PMAD continued   Played video for group featuring three women's experience with PMAD. Patient given the opportunity to provide feedback and ask questions. Reviewed the types of PMAD with group including  depression, anxiety, OCD, PPTSD and  psychosis. Shared risk factors for PMAD with group. Concluded group with progressive muscle relaxation activity. Allysia was present, attentive.     Facilitator: IFRAH Pineda

## 2025-02-25 ENCOUNTER — APPOINTMENT (OUTPATIENT)
Dept: BEHAVIORAL HEALTH | Facility: CLINIC | Age: 30
End: 2025-02-25
Payer: COMMERCIAL

## 2025-02-25 DIAGNOSIS — F41.9 ANXIETY: ICD-10-CM

## 2025-02-25 DIAGNOSIS — F33.1 MODERATE EPISODE OF RECURRENT MAJOR DEPRESSIVE DISORDER: ICD-10-CM

## 2025-02-25 PROCEDURE — 90853 GROUP PSYCHOTHERAPY: CPT | Mod: AJ,HE,GT | Performed by: SOCIAL WORKER

## 2025-02-25 NOTE — GROUP NOTE
Group Topic: Coping Skills   Group Date: 2/25/2025  Start Time: 10:00 AM  End Time: 11:00 AM  Facilitators: IFRAH Michael   Department: [facility]        Name: Ni Loera YOB: 1995   MR: [id number]        This was a video IOP group on a HIPAA compliant platform. All patients were provided with informed consent.  Date: 02/25/25, Time: 10am-11am group, Number of Patients:10, User Name: IFRAH johnston  Number of Staff: 1    Group topic(s): Gratitude   Provided psychoeducation on gratitude and the benefits for mental health. Discussed barriers and ways to practice including journaling and expressing appreciation to a friend. Facilitated gratitude exercise with group members. Ni was present attentive.     Facilitator: IFRAH Pineda

## 2025-02-26 ENCOUNTER — DOCUMENTATION (OUTPATIENT)
Dept: BEHAVIORAL HEALTH | Facility: CLINIC | Age: 30
End: 2025-02-26
Payer: COMMERCIAL

## 2025-02-26 NOTE — GROUP NOTE
"Group Topic: Coping Skills   Group Date: 2/25/2025  Start Time: 11:00 AM  End Time: 12:00 PM  Facilitators: SANDY Kirkpatrick   Department: [facility]    Number of Participants: 9   Group Focus: communication and coping skills  Treatment Modality: Interpersonal Therapy  Interventions utilized were group exercise and support  Purpose: coping skills and communication skills    This was a video IOP group on a HIPAA compliant platform. All patients were provided with informed consent.     Date: 2/25/2025, Time: 11a-12p, Number of Patients: 9, User Name: [email or web address],  Number of Staff: 2  Group topic(s): Support, asking for help and communication. Group members completed \"interpersonal inventory\" and reflected on who is in their network of support. Identified gaps in support and potential strategies to address their needs. Patients shared feelings related to the activity including grief and anxiety.    IFRAH Rodriguez LISW-S      Name: Ni Loera YOB: 1995   MR: [id number]      Ni shared about her relationship with her mother and stepmother who are her primary supports. Shared about the ways they provide practical support.  "

## 2025-02-27 ENCOUNTER — APPOINTMENT (OUTPATIENT)
Dept: BEHAVIORAL HEALTH | Facility: CLINIC | Age: 30
End: 2025-02-27
Payer: COMMERCIAL

## 2025-02-27 ENCOUNTER — OFFICE VISIT (OUTPATIENT)
Dept: OBSTETRICS AND GYNECOLOGY | Facility: CLINIC | Age: 30
End: 2025-02-27
Payer: COMMERCIAL

## 2025-02-27 ENCOUNTER — APPOINTMENT (OUTPATIENT)
Dept: OBSTETRICS AND GYNECOLOGY | Facility: CLINIC | Age: 30
End: 2025-02-27
Payer: COMMERCIAL

## 2025-02-27 VITALS
HEIGHT: 67 IN | DIASTOLIC BLOOD PRESSURE: 73 MMHG | WEIGHT: 221.8 LBS | HEART RATE: 80 BPM | BODY MASS INDEX: 34.81 KG/M2 | SYSTOLIC BLOOD PRESSURE: 111 MMHG

## 2025-02-27 DIAGNOSIS — Z30.014 ENCOUNTER FOR INITIAL PRESCRIPTION OF INTRAUTERINE CONTRACEPTIVE DEVICE (IUD): ICD-10-CM

## 2025-02-27 DIAGNOSIS — Z01.419 WELL WOMAN EXAM WITH ROUTINE GYNECOLOGICAL EXAM: ICD-10-CM

## 2025-02-27 DIAGNOSIS — Z00.00 ENCOUNTER FOR PREVENTIVE HEALTH EXAMINATION: Primary | ICD-10-CM

## 2025-02-27 PROCEDURE — 58300 INSERT INTRAUTERINE DEVICE: CPT | Performed by: MIDWIFE

## 2025-02-27 PROCEDURE — 3008F BODY MASS INDEX DOCD: CPT | Performed by: MIDWIFE

## 2025-02-27 PROCEDURE — 99395 PREV VISIT EST AGE 18-39: CPT | Performed by: MIDWIFE

## 2025-02-27 PROCEDURE — 87624 HPV HI-RISK TYP POOLED RSLT: CPT

## 2025-02-27 NOTE — PROGRESS NOTES
"Subjective   Ni Loera is a 29 y.o. female who is here for a routine exam. No menses s/p depo. Dysmenorrhea:none. Cyclic symptoms include none. No intermenstrual bleeding, spotting, or discharge.    Current contraception:  s/p depo. Patient has not had cycle since last depo and childbirth this summer  History of abnormal Pap smear: no  Family history of uterine or ovarian cancer: no  Regular self breast exam: no  History of abnormal mammogram: no  Family history of breast cancer: no    OB History          2    Para   2    Term   2       0    AB   0    Living   2         SAB   0    IAB   0    Ectopic   0    Multiple   0    Live Births   2                No LMP recorded (lmp unknown). Patient has had an injection.         Review of Systems    Objective   /73 (BP Location: Left arm, Patient Position: Sitting, BP Cuff Size: Large adult)   Pulse 80   Ht 1.689 m (5' 6.5\")   Wt 101 kg (221 lb 12.8 oz)   LMP  (LMP Unknown)   BMI 35.26 kg/m²     General:   alert, appears stated age, and cooperative   Heart: regular rate and rhythm, S1, S2 normal, no murmur, click, rub or gallop   Lungs: clear to auscultation bilaterally   Abdomen: soft, non-tender, without masses or organomegaly   Vulva: normal, Bartholin's, Urethra, [place]'s normal   Vagina: normal mucosa, normal discharge   Cervix: multiparous appearance, no bleeding following Pap, no cervical motion tenderness, and no lesions   Uterus: normal size   Adnexa: normal adnexa     Assessment/Plan   A: Annual gynecologic exam      Encounter for IUD insertion    P: Reviewed BP, weight      Pap done      Mammogram not indicated      IUD placed      RTO for annual and sooner PRN    IUD Insertion    Performed by: EDUARDO Ruiz  Authorized by: EDUARDO Ruiz    Procedure: IUD insertion    Consent obtained by patient, parent, or legal power of  - including discussion of procedure risks and benefits, patient questions " answered, and patient education provided: yes    Pregnancy risk: reasonably certain the patient is not pregnant    Pre-Medications:  Tylenol and motrin  Date/Time of Insertion:  2/27/2025 9:51 AM  Pelvic exam performed: yes    Speculum placed in vagina: yes    Cervix cleaned and prepped: yes    Tenaculum/Allis/Ring Forceps applied to cervix: yes    Anesthesia used: no    Uterus sound depth (cm):  7  Cervix manually dilated: no    IUD inserted without complications: yes    OSM: levonorgestrel (Mirena) IUD 20 mcg/day  Patient tolerated procedure well: yes    Inserted with ultrasound guidance: no    Transvaginal sono confirmed fundal placement: no    Intended removal date: 8 years

## 2025-02-27 NOTE — PROGRESS NOTES
IOP staff met with the pt. Via Zoom for about 20 minutes to identify goals as they continue to progress through the program. Pt. identified personal goals they are working on as increasing connections to others, increasing self-compassion and improving self-image (ie. Body image, self-esteem.) Pt also would like to improve ability to set boundaries. Will follow up with pt. to continue assessing progress in IOP. Pt will continue with individual therapy while in IOP with Shara Lincoln.    SANDY Kirkpatrick

## 2025-02-28 ENCOUNTER — DOCUMENTATION (OUTPATIENT)
Dept: BEHAVIORAL HEALTH | Facility: CLINIC | Age: 30
End: 2025-02-28
Payer: COMMERCIAL

## 2025-02-28 NOTE — GROUP NOTE
Group Topic: Coping Skills   Group Date: 2/25/2025  Start Time: 12:00 PM  End Time:  1:00 PM  Facilitators: SANDY Kirkpatrick   Department: [facility]    Number of Participants: 11   Group Focus: communication  Treatment Modality: Dialectical Behavioral Therapy  Interventions utilized were group exercise and patient education  Purpose: communication skills    This was a video IOP group on a HIPAA compliant platform. All patients were provided with informed consent.     Date: 2/25/2025, Time: 12p-1p, Number of Patients: 11, User Name: [email or web address],  Number of Staff: 2  Group topic(s): DBT interpersonal effectiveness skills. Patients were introduced to DBT skills DEAR MAN and GIVE. Patients discussed situations in which they planned to utilize the skills.    IFRAH Rodriguez LISW-S      Name: Ni Loera YOB: 1995   MR: [id number]      Pt participated via the chat stating she had a sore throat. She wrote out a DEAR MAN script to use with her child's father regarding getting him to help with childcare more.

## 2025-02-28 NOTE — PROGRESS NOTES
INTENSIVE OUTPATIENT PROGRAM MULTIDISCIPLINARY  TREATMENT PLAN & PROGRESS NOTE     Patient: Ni Loera   :1995 Age:29    Student: No  Treatment Team Date:25    MRN#[id number]    Psychiatrist: Dr. David Jones  Date of IOP Admission: 25  Ref by: Shara Lincoln           Week  2                Admission Scores: Pt did not complete  STEVENSON 21:  GUMARO:  BDI:   PHQ-9:  MAST: DAST: MDQ:   [place]:     Current Risk Assessment:  Suicidal Risk:      None: X    Ideation:       Plan:      Intent:      SI Plan with plan contracts for safety:     Hx of harming self:        Homicidal Risk:  None:  X Ideation:       Plan:      Intent:      HI Plan with means:                                     Hx of harming others:          Global Improvement    Clinical Global Impressions Rating Scale Of Illness:  5  1-No Illness Present   2-Minimal   3-Mild   4-Moderate   5-Marked   6-Severe  X 7-Very Severe     Diagnoses & ICD-10 Codes F32.1  Primary Diagnosis & Code: Moderate episode of recurrent major depressive disorder     Secondary Diagnosis & Code:   Psychosocial & Environmental Stressors:   Financial  insecurity :   Medication  Family/Home life: X  Work/School:X    Inadequacy of social support      Patient's Treatment Goals:            Date Initiated:            Progress Update:  2025  1.  Attend and actively participate in IOP _3_ days/week for 3 hours per day   2025 Good    Fair  X  Poor   Unclear   2.  Attend weekly medication management sessions and maintain compliance of medications  2025   Good  X  Fair     Poor     Unclear                                                        2.  Identify symptoms of diagnoses and develop coping plans    2025  Good X   Fair     Poor     Unclear                                                 3.  Increase illness education and symptom insight                  2025 Good  X  Fair     Poor     Unclear        Current medications:  See  EMR chart        Progress note:  __New to the IOP program, attending as planned  _X_Compliant, Progressing & Improving - Needs more time in IOP therapy program   __Compliant, Progressing & Improving Planning Discharge   __Compliant, Not Progressing or Improving: Reason  __Non-Compliant, not progressing or improving: Plan  __Other: Patient is progressing well in the program        Insurance & Benefits:  Name of Insurance: [employer] Yes     BENEFITS ARE BASED ON MEDICAL NECESSITY ONLY: Unlimited visits, covers 100% of the contracted rate after deductible has been met.     Co-Pay per day: $0    DEDUCTIBLE        Single:  / Family: / Accumulation:   Single:   / Family:  /  CO- INSURANCE  Single:  / Family:  / Accumulation:  Single:  /  Family: /    OUT OF POCKET  Single:  / Family:  / Accumulation:  Single: /  Family: /     Total IOP Sessions completed & authorized to date:  5    Discharge plans have been discussed with patient & Dr. AHMET Jones on:   Reason for discharge:    ___Successfully completed IOP treatment:   ___Pt. decided to seek treatment elsewhere:   ___Dropped out or no show more than three times:  ___Benefits exhausted:   ___Other:    Discharge date:                   Discharge Prognosis: Excellent      Good     Fair     Poor    Follow up appointment with: Physician:   Follow up appointment with: Therapist:    Follow up Aftercare group?  Yes __   No__     Patient provided with Crisis Hotline phone number for suicide prevention? Yes __ No __    Discharge Scores:   STEVENSON 21:  GUMARO:  BDI:   PHQ-9:  MAST: DAST: MDQ:   [place]:    Treatment plan electronically signed by Treatment Team:   ALEE Jones MD, IFRAH Menard, SANDY Rodas K. Fogarty, PsyD

## 2025-03-03 ENCOUNTER — CLINICAL SUPPORT (OUTPATIENT)
Dept: BEHAVIORAL HEALTH | Facility: CLINIC | Age: 30
End: 2025-03-03
Payer: COMMERCIAL

## 2025-03-03 ENCOUNTER — TELEMEDICINE (OUTPATIENT)
Dept: BEHAVIORAL HEALTH | Facility: CLINIC | Age: 30
End: 2025-03-03
Payer: COMMERCIAL

## 2025-03-03 DIAGNOSIS — F33.1 MODERATE EPISODE OF RECURRENT MAJOR DEPRESSIVE DISORDER: ICD-10-CM

## 2025-03-03 DIAGNOSIS — F41.9 ANXIETY: ICD-10-CM

## 2025-03-03 PROCEDURE — 99213 OFFICE O/P EST LOW 20 MIN: CPT | Performed by: STUDENT IN AN ORGANIZED HEALTH CARE EDUCATION/TRAINING PROGRAM

## 2025-03-03 PROCEDURE — 90853 GROUP PSYCHOTHERAPY: CPT | Mod: AJ,HE,GT | Performed by: SOCIAL WORKER

## 2025-03-03 NOTE — PROGRESS NOTES
"Subjective    All Individuals Present: Patient and Provider (Encounter Provider)     ID: Ni Loera is a 29 y.o. female presenting to [facility].     Interval History/HPI/PFSH:    6 months PP. No longer breastfeeding.    Has benefitted from knowing that others are going through same thing, helps feel less along.    Mood doing fairly well. Sleep still broken, will tyr to get new mattress to help sleep when baby is sleeping.    Still would like to defer medications.    Denies SI/HI/AVH.       Medication side effects: Not Applicable     Review of Systems  Constitutional: Positive for fatigue  Psychiatric: Positive for anxiety, depression, irritability, loss of interest in favorite activities, sleep disturbance, and socially withdrawn, Negative for aggressive behavior  Neurological: Negative   Other: 6 months PP    Objective   LMP  (LMP Unknown)   Wt Readings from Last 4 Encounters:   02/27/25 101 kg (221 lb 12.8 oz)   01/15/25 100 kg (221 lb)   10/11/24 97.5 kg (215 lb)   09/04/24 98.9 kg (218 lb)       Mental Status Exam  General Appearance: Well groomed, appropriate eye contact.  Attitude/Behavior: Cooperative, conversant, engaged, and with good eye contact.  Motor: Psychomotor retardation.  Speech: Normal rate, volume, prosody  Gait/Station: Within normal limits  Mood: \"okay\"  Affect: Dysphoric, constricted but reactive, Anxious, and Congruent with mood and topic of conversation  Thought Process: Linear, goal directed  Thought Associations: No loosening of associations  Thought Content: normal  Sensorium: Alert and oriented to person, place, time and situation  Insight: Intact, as evidenced by awareness of symptom patterns  Judgment: Intact  Cognition: Attention: Mild impairment in attention, Fund of Knowledge: Good, and Language: Naming intact  Testing: N/A.    Laboratory/Imaging/Diagnostic Tests       Assessment/Plan   Overall Formulation and Differential Diagnosis:  Ni Loera is a 29 y.o. female who " meets criteria for MDD and HUMA< with peripartum exacerbation.    Interval Assessment:  Coming to IOP to bolster skills in managing mood and anxiety. Would prefer to use psychotherapy exclusively, but open to medications if not fully remitted    Plan:  -currently defers medications, wants to see what responds to group but willing to try if needed  -Reviewed treatment goals listed in treatment plan and pt progressing towards these goals as seen by group participation.  -Continue Intensive Outpatient Program.     Risk Assessment:  Imminent Risk of Suicide or Serious Self-Injury: Low Risk -- Risk factors include: Depression, Loss (relational, social, occupational, financial) , Sense of isolation , and Severe anxiety Protective factors include:Denies current suicidal ideation, Denies history of suicide attempts , Future-oriented talk , Willingness to seek help and support , and Restricted access to firearms or other lethal means of suicide   Imminent Risk of Violence or Homicide: Low Risk - Risk factors include: No significant risk factors identified on screening. Protective factors include: Lack of known history of harm to others , Lack of known history of violent ideation , and Lack of known access to firearms   Treatment Plan:  There are no recently modified care plans to display for this patient.      Attestation Statements   Topics (in addition those noted above): Diagnostic impressions, Importance of adherence to treatment , Instructions for treatment , Patient education , Prognosis , Risks and benefits of treatments , and Risk factor reduction

## 2025-03-04 ENCOUNTER — CLINICAL SUPPORT (OUTPATIENT)
Dept: BEHAVIORAL HEALTH | Facility: CLINIC | Age: 30
End: 2025-03-04
Payer: COMMERCIAL

## 2025-03-04 DIAGNOSIS — F41.9 ANXIETY: ICD-10-CM

## 2025-03-04 DIAGNOSIS — F33.1 MODERATE EPISODE OF RECURRENT MAJOR DEPRESSIVE DISORDER: ICD-10-CM

## 2025-03-04 PROCEDURE — 90853 GROUP PSYCHOTHERAPY: CPT | Mod: AJ,HE,GT | Performed by: SOCIAL WORKER

## 2025-03-04 NOTE — GROUP NOTE
"Group Topic: Feeling Awareness/Expression   Group Date: 3/3/2025  Start Time: 10:00 AM  End Time: 11:00 AM  Facilitators: SANDY Kirkpatrick   Department: [facility]    Number of Participants: 8   Group Focus: check in  Treatment Modality: Cognitive Behavioral Therapy  Interventions utilized were exploration, patient education, and support  Purpose: feelings and insight or knowledge    This was a video IOP group on a HIPAA compliant platform. All patients were provided with informed consent.     Date: 3/4/2025, Time: 10a-11a, Number of Patients: 8, User Name: [email or web address],  Number of Staff: 1  Group topic(s): Check in. Patients checked in about their weekend and progress on goals they had set the week prior. Patients also discussed the concept of recovery from mental illness and what it means to them. Discussed the \"sawtooth pattern\" (non-linear pattern) of recovery.     SANDY Rodriguez-S      Name: Ni Loera YOB: 1995   MR: [id number]      Ni shared a positive moment from the weekend was that she had a breakfast date with her daughter and left the house. Pt shared her son is starting to crawl which brings both azeem and sadness that he is getting older. Pt used deep breathing and practiced interpersonal effectiveness skills with her daughter this weekend.  "

## 2025-03-04 NOTE — GROUP NOTE
"Group Topic: Coping Skills   Group Date: 3/3/2025  Start Time: 11:00 AM  End Time: 12:00 PM  Facilitators: SANDY Kirkpatrick   Department: [facility]    Number of Participants: 9   Group Focus: coping skills and diagnosis education  Treatment Modality: Dialectical Behavioral Therapy and Psychoeducation  Interventions utilized were patient education and story telling  Purpose: coping skills and feelings    This was a video IOP group on a HIPAA compliant platform. All patients were provided with informed consent.     Date: 3/3/2025, Time: 11a-12p, Number of Patients: 9, User Name: [email or web address],  Number of Staff: 1  Group topic(s): Grounding and the window of tolerance. Patients received psychoeducation about the window of tolerance and fight, flight and freeze/shut down responses (Dewayne Wu, PhD). Patients were introduced to grounding skills including mental, physical and soothing grounding (from \"Seeking safety\" curriculum.)    SANDY Rodriguez-S      Name: Ni Loera YOB: 1995   MR: [id number]      Ni was present and attentive. She shared that the paced breathing exercise helped her feel calmer.  "

## 2025-03-04 NOTE — GROUP NOTE
"Group Topic: Coping Skills   Group Date: 3/3/2025  Start Time: 12:00 PM  End Time:  1:00 PM  Facilitators: SANDY Kirkpatrick   Department: [facility]    Number of Participants: 9   Group Focus: coping skills  Treatment Modality: Dialectical Behavioral Therapy  Interventions utilized were group exercise and patient education  Purpose: coping skills    This was a video IOP group on a HIPAA compliant platform. All patients were provided with informed consent.     Date: 3/3/2025, Time: 12p-1p, Number of Patients: 9, User Name: [email or web address],  Number of Staff: 1  Group topic(s): Window of tolerance and grounding/distress tolerance skills, continued.  Patients completed reflective writing to identify signs (thoughts, behaviors, body sensations) they are beginning to exit their window of tolerance and common triggers. Patients then discussed coping plans and use of distress tolerance techniques. Group was also led in guided visualization \"safe place\" exercise.  SANDY Rodriguez-S      Name: Ni Loera YOB: 1995   MR: [id number]      Ni said that signs for her that she is exiting her window of tolerance are that her hands are sweaty, heart rate increases and she becomes more irritable. She shared wanting to practice more positive self-talk and breathing exercises to regulate herself.  "

## 2025-03-04 NOTE — GROUP NOTE
Group Topic: Feeling Awareness/Expression   Group Date: 2/24/2025  Start Time: 10:00 AM  End Time: 11:00 AM  Facilitators: SANDY Kirkpatrick   Department: [facility]    Number of Participants: 4   Group Focus: check in  Treatment Modality: Cognitive Behavioral Therapy  Interventions utilized were exploration, group exercise, and support  Purpose: feelings and insight or knowledge    This was a video IOP group on a HIPAA compliant platform. All patients were provided with informed consent.     Date: 3/4/2025, Time: 10a-11a, Number of Patients: 4, User Name: [email or web address],  Number of Staff: 1  Group topic(s): Check in. Group members checked in about their weekend high, low and growth moments. Shared about their current stressors and mental health symptoms.    SANDY Rodriguez-S      Name: Ni Loera YOB: 1995   MR: [id number]      Ni shared her weekend went well. Her father came over and helped with a household task. Being with him was a growth moment for her. The difficult moment she encountered was helping her baby who is teething and was more fussy than usual. Pt shared feeling defeated and overwhelmed.

## 2025-03-04 NOTE — GROUP NOTE
Group Topic: Coping Skills   Group Date: 3/4/2025  Start Time: 10:00 AM  End Time: 11:00 AM  Facilitators: IFRAH Michael   Department: [facility]        Name: Ni Loera YOB: 1995   MR: [id number]      This was a video IOP group on a HIPAA compliant platform. All patients were provided with informed consent.  Date: 03/04/25, Time: 10am-11am group, Number of Patients:10, User Name: IFRAH [email or web address]  Number of Staff: 2    Group topic(s): Forgiveness   Provided psychoeducation on the stages of forgiveness. Reviewed forgiveness therapy worksheet with group.  Patient's given the opportunity to share responses and ask questions. Group discussion on what forgiveness means, barriers and benefits. Ni was present, attentive.     Facilitator: IFRAH Pineda

## 2025-03-05 NOTE — GROUP NOTE
Group Topic: Feeling Awareness/Expression   Group Date: 3/4/2025  Start Time: 11:00 AM  End Time: 12:00 PM  Facilitators: SANDY Kirkpatrick   Department: [facility]    Number of Participants: 9   Group Focus: feeling awareness/expression  Treatment Modality: Interpersonal Therapy  Interventions utilized were exploration, story telling, and support  Purpose: feelings and insight or knowledge    This was a video IOP group on a HIPAA compliant platform. All patients were provided with informed consent.     Date: 3/4/2025, Time: 11a-12p, Number of Patients: 9, User Name: [email or web address],  Number of Staff: 1  Group topic(s): Role transitions, grief and identity in the  period (from [employer] curriculum.)  Patients discussed the gains and losses involved in being pregnant or having a baby. Normalized the experience of grief and loss that often accompanies changes in role when having a child. Began to discuss ways of coping with the transition to having a baby (pleasant activities, supports.)    SANDY Rodriguez-S      Name: Ni Loera YOB: 1995   MR: [id number]      Ni shared about the challenges involved in transitioning to having two children instead of one- pt shared she had more support from family with her first child. Pt also expressed her feelings about helping her daughter transition to having a sibling.

## 2025-03-06 ENCOUNTER — OFFICE VISIT (OUTPATIENT)
Dept: OBSTETRICS AND GYNECOLOGY | Facility: CLINIC | Age: 30
End: 2025-03-06
Payer: COMMERCIAL

## 2025-03-06 ENCOUNTER — APPOINTMENT (OUTPATIENT)
Dept: OBSTETRICS AND GYNECOLOGY | Facility: CLINIC | Age: 30
End: 2025-03-06
Payer: COMMERCIAL

## 2025-03-06 ENCOUNTER — CLINICAL SUPPORT (OUTPATIENT)
Dept: BEHAVIORAL HEALTH | Facility: CLINIC | Age: 30
End: 2025-03-06
Payer: COMMERCIAL

## 2025-03-06 VITALS
SYSTOLIC BLOOD PRESSURE: 123 MMHG | DIASTOLIC BLOOD PRESSURE: 75 MMHG | BODY MASS INDEX: 34.97 KG/M2 | WEIGHT: 222.8 LBS | HEIGHT: 67 IN

## 2025-03-06 DIAGNOSIS — Z30.431 IUD CHECK UP: Primary | ICD-10-CM

## 2025-03-06 DIAGNOSIS — F33.1 MODERATE EPISODE OF RECURRENT MAJOR DEPRESSIVE DISORDER: ICD-10-CM

## 2025-03-06 DIAGNOSIS — F41.9 ANXIETY: ICD-10-CM

## 2025-03-06 PROCEDURE — 3008F BODY MASS INDEX DOCD: CPT | Performed by: OBSTETRICS & GYNECOLOGY

## 2025-03-06 PROCEDURE — 90853 GROUP PSYCHOTHERAPY: CPT | Mod: AJ,HE,GT | Performed by: SOCIAL WORKER

## 2025-03-06 PROCEDURE — 99213 OFFICE O/P EST LOW 20 MIN: CPT | Performed by: OBSTETRICS & GYNECOLOGY

## 2025-03-06 NOTE — PROGRESS NOTES
Subjective   Patient ID: Ni Loera is a 29 y.o. female who presents for IUD string trim. Patient had a Mirena inserted on 2/27/2025, needs her strings trimmed.  HPI    Review of Systems    Objective   Physical Exam    Assessment/Plan   {Assess/PlanSmartLinks:[id number]}

## 2025-03-07 ENCOUNTER — DOCUMENTATION (OUTPATIENT)
Dept: BEHAVIORAL HEALTH | Facility: CLINIC | Age: 30
End: 2025-03-07
Payer: COMMERCIAL

## 2025-03-07 NOTE — GROUP NOTE
Group Topic: Community   Group Date: 3/6/2025  Start Time: 10:00 AM  End Time: 11:00 AM  Facilitators: Kena Dacosta PsyD   Department: [facility]    Number of Participants: 8   Group Focus: other levels of support  Treatment Modality: Psychoeducation  Interventions utilized were support  Purpose: insight or knowledge    This was a video IOP group on a HIPAA compliant platform. All patients were provided with informed consent.    Group topic: Levels of support  Group members identified characteristics of various levels of support (I.e., healthy, neutral or destructive). They explored how these characteristics are present in relationships and how they impact their well-being and ability to ask for support.   Kena Dacosta Psy.D.        Name: Ni Loera YOB: 1995   MR: [id number]      Ni was present and actively listened to discussion. She was observed on camera but noted that she was having some difficulties with her connection. She appeared engaged in the topic.

## 2025-03-07 NOTE — GROUP NOTE
Group Topic: Community   Group Date: 3/6/2025  Start Time: 11:00 AM  End Time: 12:00 PM  Facilitators: Kena Dacosta PsyD   Department: [facility]    Number of Participants: 9   Group Focus: other Support  Treatment Modality: Psychoeducation  Interventions utilized were support  Purpose: insight or knowledge    This was a video IOP group on a HIPAA compliant platform. All patients were provided with informed consent.    Group topic: Healthy vs Unhealthy relationships  Group members received education on elements of healthy vs unhealthy relationships. They reviewed fair fighting rules to use in conflict and discussed ways to use these strategies in difficulty situations.   Kena Dacosta Psy.D.        Name: Ni Loera YOB: 1995   MR: [id number]      Ni was present and actively listened to discussion. She was observed nodding along to discussion and appeared engaged in the topic.

## 2025-03-07 NOTE — GROUP NOTE
Group Topic: Coping Skills   Group Date: 3/6/2025  Start Time: 12:00 PM  End Time:  1:00 PM  Facilitators: IFRAH Michael   Department: [facility]        Name: Ni Loera YOB: 1995   MR: [id number]      This was a video IOP group on a HIPAA compliant platform. All patients were provided with informed consent.  Date: 03/06/2025, Time: 12pm-1pm- group, Number of Patients:9, User Name: IFRAH johnston  Number of Staff: 1    Group topic(s): Weekend Smart Goals   Patients set goals using the SMART framework surrounding enjoyment, achievement and closeness to others. Patients also identified times they anticipate being vulnerable over the weekend and how they will cope. Patient plan to have dinner with her mother.     Facilitator: IFRAH Pineda

## 2025-03-07 NOTE — PROGRESS NOTES
INTENSIVE OUTPATIENT PROGRAM MULTIDISCIPLINARY  TREATMENT PLAN & PROGRESS NOTE     Patient: Ni Loera   :1995 Age:29    Student: No  Treatment Team Date:2025  MRN#[id number]    Psychiatrist: Dr. David Jones  Date of IOP Admission: 25  Ref by: Shara Lincoln           Week  3                Admission Scores: Pt did not complete  STEVENSON 21:  GUMARO:  BDI:   PHQ-9:  MAST: DAST: MDQ:   [place]:     Current Risk Assessment:  Suicidal Risk:      None: X    Ideation:       Plan:      Intent:      SI Plan with plan contracts for safety:     Hx of harming self:        Homicidal Risk:  None:  X Ideation:       Plan:      Intent:      HI Plan with means:                                     Hx of harming others:          Global Improvement    Clinical Global Impressions Rating Scale Of Illness:  5  1-No Illness Present   2-Minimal   3-Mild   4-Moderate   5-Marked   6-Severe  X 7-Very Severe     Diagnoses & ICD-10 Codes F32.1  Primary Diagnosis & Code: Moderate episode of recurrent major depressive disorder     Secondary Diagnosis & Code:   Psychosocial & Environmental Stressors:   Financial  insecurity :   Medication  Family/Home life: X  Work/School:X    Inadequacy of social support      Patient's Treatment Goals:            Date Initiated:            Progress Update:  2025  1.  Attend and actively participate in IOP _3_ days/week for 3 hours per day   2025 Good  X  Fair   Poor   Unclear   2.  Attend weekly medication management sessions and maintain compliance of medications  2025   Good  X  Fair     Poor     Unclear                                                        2.  Identify symptoms of diagnoses and develop coping plans    2025  Good X   Fair     Poor     Unclear                                                 3.  Increase illness education and symptom insight                  2025 Good  X  Fair     Poor     Unclear        Current medications:  See  EMR chart        Progress note:  __New to the IOP program, attending as planned  _X_Compliant, Progressing & Improving - Needs more time in IOP therapy program   __Compliant, Progressing & Improving Planning Discharge   __Compliant, Not Progressing or Improving: Reason  __Non-Compliant, not progressing or improving: Plan  __Other: Patient is progressing well in the program        Insurance & Benefits:  Name of Insurance: [employer] Yes     BENEFITS ARE BASED ON MEDICAL NECESSITY ONLY: Unlimited visits, covers 100% of the contracted rate after deductible has been met.     Co-Pay per day: $0    DEDUCTIBLE        Single:  / Family: / Accumulation:   Single:   / Family:  /  CO- INSURANCE  Single:  / Family:  / Accumulation:  Single:  /  Family: /    OUT OF POCKET  Single:  / Family:  / Accumulation:  Single: /  Family: /     Total IOP Sessions completed & authorized to date:  8    Discharge plans have been discussed with patient & Dr. AHMET Jones on:   Reason for discharge:    ___Successfully completed IOP treatment:   ___Pt. decided to seek treatment elsewhere:   ___Dropped out or no show more than three times:  ___Benefits exhausted:   ___Other:    Discharge date:                   Discharge Prognosis: Excellent      Good     Fair     Poor    Follow up appointment with: Physician:   Follow up appointment with: Therapist:    Follow up Aftercare group?  Yes __   No__     Patient provided with Crisis Hotline phone number for suicide prevention? Yes __ No __    Discharge Scores:   STEVENSON 21:  GUMARO:  BDI:   PHQ-9:  MAST: DAST: MDQ:   [place]:    Treatment plan electronically signed by Treatment Team:   ALEE Jones MD, IFRAH Menard, SANDY Rodas K. Fogarty, PsyD

## 2025-03-10 ENCOUNTER — APPOINTMENT (OUTPATIENT)
Dept: BEHAVIORAL HEALTH | Facility: CLINIC | Age: 30
End: 2025-03-10
Payer: COMMERCIAL

## 2025-03-10 DIAGNOSIS — F41.9 ANXIETY: ICD-10-CM

## 2025-03-10 DIAGNOSIS — F33.1 MODERATE EPISODE OF RECURRENT MAJOR DEPRESSIVE DISORDER: ICD-10-CM

## 2025-03-10 PROCEDURE — 90853 GROUP PSYCHOTHERAPY: CPT | Mod: AJ,HE,GT | Performed by: SOCIAL WORKER

## 2025-03-10 NOTE — GROUP NOTE
Group Topic: Coping Skills   Group Date: 3/10/2025  Start Time: 11:00 AM  End Time: 12:00 PM  Facilitators: IFRAH Michael   Department: [facility]        Name: Ni Loera YOB: 1995   MR: [id number]      This was a video IOP group on a HIPAA compliant platform. All patients were provided with informed consent.  Date: 03/10/25, Time: 11am-12pm group, Number of Patients:9, User Name: IFRAH johnston  Number of Staff: 1    Group topic(s): Maladaptive coping skills, Behavioral activation    Provided psychoeducation on maladaptive coping skills. Discussed consequences of long term use. Discussed behavioral activation and healthy coping skills. Showed patient healthy vs unhealthy coping skills worksheet. Ni was present, attentive.     Facilitator: IFRAH Pineda

## 2025-03-10 NOTE — GROUP NOTE
Group Topic: Goals   Group Date: 3/10/2025  Start Time: 10:00 AM  End Time: 11:00 AM  Facilitators: SANDY Kirkpatrick   Department: [facility]    Number of Participants: 8   Group Focus: check in and goals  Treatment Modality: Solution-Focused Therapy  Interventions utilized were story telling and support  Purpose: coping skills and self-care    This was a video IOP group on a HIPAA compliant platform. All patients were provided with informed consent.     Date: 3/10/2025, Time: 10a-11a, Number of Patients: 8, User Name: [email or web address],  Number of Staff: 1  Group topic(s): Check in/goals. Patients checked in about their weekend high/low and growth moments. Progress on weekend goals discussed. Identified intentions for self-care for this week.    SANDY Rodriguez-S      Name: Ni Loera YOB: 1995   MR: [id number]      Ni shared she completed all the goals she set out for herself this weekend- got her nails done, spent time with family, went on a date with her partner. Pt shared about irritability related to a conflict with her mother- an improvement was that she did not react out of anger with her.

## 2025-03-10 NOTE — GROUP NOTE
Group Topic: Coping Skills   Group Date: 3/10/2025  Start Time: 12:00 PM  End Time:  1:00 PM  Facilitators: IFRAH Michael   Department: [facility]        Name: Ni Loera YOB: 1995   MR: [id number]        This was a video IOP group on a HIPAA compliant platform. All patients were provided with informed consent.     Date: 03/10/25, Time: 12pm-1pm group, Number of Patients: 9, User Name: [email or web address]  Number of Staff: 1    Group topic(s): Cognitive distortions, healthy coping skills    Patients completing heathy vs unhealthy coping skills worksheet and shared responses. Shared  cognitive distortions power point with group. Patients were given the opportunity to comment and ask questions. Ni was present, attentive.     Facilitator: IFRAH Pineda

## 2025-03-11 ENCOUNTER — APPOINTMENT (OUTPATIENT)
Dept: BEHAVIORAL HEALTH | Facility: CLINIC | Age: 30
End: 2025-03-11
Payer: COMMERCIAL

## 2025-03-11 DIAGNOSIS — F41.9 ANXIETY: ICD-10-CM

## 2025-03-11 DIAGNOSIS — F33.1 MODERATE EPISODE OF RECURRENT MAJOR DEPRESSIVE DISORDER: ICD-10-CM

## 2025-03-11 PROCEDURE — 90853 GROUP PSYCHOTHERAPY: CPT | Mod: AJ,HE,GT | Performed by: SOCIAL WORKER

## 2025-03-11 NOTE — GROUP NOTE
Group Topic: Coping Skills   Group Date: 3/11/2025  Start Time: 10:00 AM  End Time: 11:00 AM  Facilitators: IFRAH Michael   Department: [facility]        Name: Ni Loera YOB: 1995   MR: [id number]      This was a video IOP group on a HIPAA compliant platform. All patients were provided with informed consent.  Date: 03/11/25, Time: 10am-11am group, Number of Patients:7, User Name: IFRAH [email or web address]  Number of Staff: 1    Group topic(s): Culture, identity and mental health   Discussed the impact that culture and identity has on mental health. Discussed belonging and being judged as a parent. Group members shared the messages they learned about mental health growing up. Asked group members to identify role models they have as parents. Patient's identifying one thing they are proud of as a parent. Ni was present, attentive.     Facilitator: IFRAH Pineda

## 2025-03-12 LAB
CYTOLOGY CMNT CVX/VAG CYTO-IMP: NORMAL
HPV HR 12 DNA GENITAL QL NAA+PROBE: NEGATIVE
HPV HR GENOTYPES PNL CVX NAA+PROBE: NEGATIVE
HPV16 DNA SPEC QL NAA+PROBE: NEGATIVE
HPV18 DNA SPEC QL NAA+PROBE: NEGATIVE
LAB AP HPV GENOTYPE QUESTION: YES
LAB AP HPV HR: NORMAL
LABORATORY COMMENT REPORT: NORMAL
PATH REPORT.TOTAL CANCER: NORMAL

## 2025-03-12 NOTE — GROUP NOTE
Group Topic: Coping Skills   Group Date: 3/11/2025  Start Time: 11:00 AM  End Time: 12:00 PM  Facilitators: SANDY Kirkpatrick   Department: [facility]    Number of Participants: 7   Group Focus: coping skills  Treatment Modality: Psychoeducation  Interventions utilized were patient education, problem solving, and support  Purpose: coping skills and self-care    This was a video IOP group on a HIPAA compliant platform. All patients were provided with informed consent.     Date: 3/12/2025, Time: -12p, Number of Patients: 7, User Name: [email or web address],  Number of Staff: 2  Group topic(s): Self-care. Group members defined self-care and various types of it. Discussed barriers to practicing self-care in the  period, including guilt, finances and inadequate support- identified ways to address barriers. Group members supported each other with problem solving needs they each had.    IFRAH Rodriguez LISW-S      Name: Ni Loera YOB: 1995   MR: [id number]      Ni was present and attentive but did not share.

## 2025-03-13 ENCOUNTER — APPOINTMENT (OUTPATIENT)
Dept: BEHAVIORAL HEALTH | Facility: CLINIC | Age: 30
End: 2025-03-13
Payer: COMMERCIAL

## 2025-03-13 DIAGNOSIS — F33.1 MODERATE EPISODE OF RECURRENT MAJOR DEPRESSIVE DISORDER: ICD-10-CM

## 2025-03-13 DIAGNOSIS — F41.9 ANXIETY: ICD-10-CM

## 2025-03-13 PROCEDURE — 90853 GROUP PSYCHOTHERAPY: CPT | Mod: AJ,HE,GT | Performed by: SOCIAL WORKER

## 2025-03-14 ENCOUNTER — DOCUMENTATION (OUTPATIENT)
Dept: BEHAVIORAL HEALTH | Facility: CLINIC | Age: 30
End: 2025-03-14
Payer: COMMERCIAL

## 2025-03-17 ENCOUNTER — APPOINTMENT (OUTPATIENT)
Dept: BEHAVIORAL HEALTH | Facility: CLINIC | Age: 30
End: 2025-03-17
Payer: COMMERCIAL

## 2025-03-17 DIAGNOSIS — F33.1 MODERATE EPISODE OF RECURRENT MAJOR DEPRESSIVE DISORDER: ICD-10-CM

## 2025-03-17 DIAGNOSIS — F41.9 ANXIETY: ICD-10-CM

## 2025-03-17 PROCEDURE — 90853 GROUP PSYCHOTHERAPY: CPT | Mod: AJ,HE,GT | Performed by: SOCIAL WORKER

## 2025-03-17 NOTE — GROUP NOTE
Group Topic: Feeling Awareness/Expression   Group Date: 3/13/2025  Start Time: 10:00 AM  End Time: 11:00 AM  Facilitators: Kena Dacosta PsyD   Department: [facility]    Number of Participants: 9   Group Focus: other Vulnerability  Treatment Modality: Psychoeducation  Interventions utilized were exploration and patient education  Purpose: feelings and insight or knowledge    This was a video IOP group on a HIPAA compliant platform. All patients were provided with informed consent.    Group topic:  Vulnerability  Group members reviewed topic of healthy vs unhealthy relationships and discussed how they have used this information to navigate their own relationships. They watched video from Luis Mares, The Power of Vulnerability. They processed personal experiences of vulnerability and explored benefits and risks of being vulnerable. Participants identified ways in which they numb to avoid discomfort.   Kena Dacosta Psy.D.        Name: Ni Loera YOB: 1995   MR: [id number]      Ni was present and actively listened to discussion. She was observed on camera and nodding along to discussion. She did not share.

## 2025-03-17 NOTE — GROUP NOTE
Group Topic: Coping Skills   Group Date: 3/17/2025  Start Time: 11:00 AM  End Time: 12:00 PM  Facilitators: IFRAH Michael   Department: [facility]        Name: Ni Loera YOB: 1995   MR: [id number]        This was a video IOP group on a HIPAA compliant platform. All patients were provided with informed consent.     Date: 03/17/25, Time: 11am-12pm group, Number of Patients: 10, User Name: [email or web address]  Number of Staff: 1    Group topic(s): Boundaries  Provided psychoeducation on personal boundaries. Shared with group boundary information worksheet. Discussed the difference between rigid, porous and healthy boundaries. Patients completing boundary exploration worksheet.  Patient wrote in chat having a difficult morning and not wanting to share. Ni was present, attentive.     Facilitator: IFRAH Pineda

## 2025-03-17 NOTE — PROGRESS NOTES
INTENSIVE OUTPATIENT PROGRAM MULTIDISCIPLINARY  TREATMENT PLAN & PROGRESS NOTE     Patient: Ni Loera   :1995 Age:29    Student: No  Treatment Team Date:2025  MRN#[id number]    Psychiatrist: Dr. David Jones  Date of IOP Admission: 25  Ref by: Shara Lincoln           Week  4               Admission Scores: Pt did not complete  STVEENSON 21:  GUMARO:  BDI:   PHQ-9:  MAST: DAST: MDQ:   [place]:     Current Risk Assessment:  Suicidal Risk:      None: X    Ideation:       Plan:      Intent:      SI Plan with plan contracts for safety:     Hx of harming self:        Homicidal Risk:  None:  X Ideation:       Plan:      Intent:      HI Plan with means:                                     Hx of harming others:          Global Improvement    Clinical Global Impressions Rating Scale Of Illness:  5  1-No Illness Present   2-Minimal   3-Mild   4-Moderate   5-Marked   6-Severe  X 7-Very Severe     Diagnoses & ICD-10 Codes F32.1  Primary Diagnosis & Code: Moderate episode of recurrent major depressive disorder     Secondary Diagnosis & Code:   Psychosocial & Environmental Stressors:   Financial  insecurity :   Medication  Family/Home life: X  Work/School:X    Inadequacy of social support      Patient's Treatment Goals:            Date Initiated:            Progress Update:  2025  1.  Attend and actively participate in IOP _3_ days/week for 3 hours per day   2025 Good  X  Fair   Poor   Unclear   2.  Attend weekly medication management sessions and maintain compliance of medications  2025   Good  X  Fair     Poor     Unclear                                                        2.  Identify symptoms of diagnoses and develop coping plans    2025  Good X   Fair     Poor     Unclear                                                 3.  Increase illness education and symptom insight                  2025 Good  X  Fair     Poor     Unclear        Current medications:  See EMR  chart        Progress note:  __New to the IOP program, attending as planned  _X_Compliant, Progressing & Improving - Needs more time in IOP therapy program   __Compliant, Progressing & Improving Planning Discharge   __Compliant, Not Progressing or Improving: Reason  __Non-Compliant, not progressing or improving: Plan  __Other: Patient is progressing well in the program. Patient shared in group that she decided to reconcile with FOB.         Insurance & Benefits:  [place] [facility] [facility] [place]     BENEFITS ARE BASED ON MEDICAL NECESSITY ONLY: Unlimited visits, covers 100% of the contracted rate after deductible has been met.     Co-Pay per day: $0    DEDUCTIBLE        Single:  / Family: / Accumulation:   Single:   / Family:  /  CO- INSURANCE  Single:  / Family:  / Accumulation:  Single:  /  Family: /    OUT OF POCKET  Single:  / Family:  / Accumulation:  Single: /  Family: /     Total IOP Sessions completed & authorized to date:  11    Discharge plans have been discussed with patient & Dr. AHMET Jones on:   Reason for discharge:    ___Successfully completed IOP treatment:   ___Pt. decided to seek treatment elsewhere:   ___Dropped out or no show more than three times:  ___Benefits exhausted:   ___Other:    Discharge date:                   Discharge Prognosis: Excellent      Good     Fair     Poor    Follow up appointment with: Physician:   Follow up appointment with: Therapist:    Follow up Aftercare group?  Yes __   No__     Patient provided with Crisis Hotline phone number for suicide prevention? Yes __ No __    Discharge Scores:   STEVENSON 21:  GUMARO:  BDI:   PHQ-9:  MAST: DAST: MDQ:   [place]:    Treatment plan electronically signed by Treatment Team:   ALEE Jones MD, IFRAH Menard J. Wyse, LISW, K. Fogarty, PsyD

## 2025-03-17 NOTE — GROUP NOTE
Group Topic: Feeling Awareness/Expression   Group Date: 3/17/2025  Start Time: 10:00 AM  End Time: 11:00 AM  Facilitators: SANDY Kirkpatrick   Department: [facility]    Number of Participants: 10   Group Focus: check in  Treatment Modality: Solution-Focused Therapy  Interventions utilized were group exercise, story telling, and support  Purpose: coping skills and feelings    This was a video IOP group on a HIPAA compliant platform. All patients were provided with informed consent.     Date: 3/17/2025, Time: 10a-11a, Number of Patients: 10, User Name: [email or web address],  Number of Staff: 2  Group topic(s): Check in. Patients shared their highs and lows from the weekend as well as moments of growth or change. Patients introduced each other and group norms were reviewed by facilitator.    IFRAH Rodriguez LISW-S      Name: Ni Loera YOB: 1995   MR: [id number]      Ni participated via the chat because she said she had a sore throat. She said she made the decision to separate from her son's father which has been challenging. She identified spending time with other family as a positive moment and growth as spending time identifying long term goals for herself.

## 2025-03-17 NOTE — GROUP NOTE
Group Topic: Coping Skills   Group Date: 3/17/2025  Start Time: 12:00 PM  End Time:  1:00 PM  Facilitators: IFRAH Michael   Department: [facility]        Name: Ni Loera YOB: 1995   MR: [id number]      This was a video IOP group on a HIPAA compliant platform. All patients were provided with informed consent.  Date: 03/17/25, Time: 12pm-1pm group, Number of Patients:10, User Name: IFRAH [email or web address]  Number of Staff: 2    Group topic(s): Boundaries continued   Patients given the opportunity to share responses from boundary exploration worksheet. Discussed ways to address boundaries violations and how to say no. Reviewed assertive communication skills.     Facilitator: IFRAH Pineda

## 2025-03-18 ENCOUNTER — APPOINTMENT (OUTPATIENT)
Dept: BEHAVIORAL HEALTH | Facility: CLINIC | Age: 30
End: 2025-03-18
Payer: COMMERCIAL

## 2025-03-18 ENCOUNTER — DOCUMENTATION (OUTPATIENT)
Dept: BEHAVIORAL HEALTH | Facility: CLINIC | Age: 30
End: 2025-03-18

## 2025-03-18 DIAGNOSIS — F41.9 ANXIETY: ICD-10-CM

## 2025-03-18 DIAGNOSIS — F33.1 MODERATE EPISODE OF RECURRENT MAJOR DEPRESSIVE DISORDER: ICD-10-CM

## 2025-03-18 PROCEDURE — 90853 GROUP PSYCHOTHERAPY: CPT | Mod: AJ,HE,GT | Performed by: SOCIAL WORKER

## 2025-03-18 NOTE — GROUP NOTE
Group Topic: Feeling Awareness/Expression   Group Date: 3/18/2025  Start Time: 11:00 AM  End Time: 12:00 PM  Facilitators: SANDY Kirkpatrick   Department: [facility]    Number of Participants: 9   Group Focus: diagnosis education, feeling awareness/expression, and self-esteem  Treatment Modality: Psychoeducation  Interventions utilized were group exercise, patient education, and story telling  Purpose: feelings and self-worth    This was a video IOP group on a HIPAA compliant platform. All patients were provided with informed consent.     Date: 3/18/2025, Time: -12p, Number of Patients: 9, User Name: [email or web address],  Number of Staff: 2  Group topic(s): Body image and the  period. Patients discussed changes in body image they experienced throughout the  period. Engaged in a reflective exercise to practice body appreciation and gratitude. Patients also watched a brief video of postpartum women discussing their own experiences with body image and pregnancy.    IFRAH Rodriguez LISW-S    Name: Ni Loera YOB: 1995   MR: [id number]      Ni shared about long term struggles with her body image that have worsened in the  period. She appreciated in the video the reframe one of the women had about how stretch marks represented the ability she had to have a baby and how others who can't get pregnant would want this experience.

## 2025-03-18 NOTE — GROUP NOTE
Group Topic: Coping Skills   Group Date: 3/18/2025  Start Time: 10:00 AM  End Time: 11:00 AM  Facilitators: IFRAH Michael   Department: [facility]        Name: Ni Loera YOB: 1995   MR: [id number]      This was a video IOP group on a HIPAA compliant platform. All patients were provided with informed consent.  Date: 03/18/2025, Time: 10am-11am group, Number of Patients:9 User Name: IFRAH johnston  Number of Staff: 2    Group topic(s): Returning to work, Mom guilt   Group discussion on mom guilt and balancing work, personal interests and parenting. Patients sharing what they imagine a “perfect mother” and “bad mother” to be. Shared the myth of a perfect mother by Amanda Dennis. Discussed societal expectations of women and gender roles. Patient shared that her employer was not supportive of her breast feeding. Ni was present, attentive.     Facilitator: IFRAH Pineda

## 2025-03-19 NOTE — GROUP NOTE
Group Topic: Feeling Awareness/Expression   Group Date: 3/18/2025  Start Time: 12:00 PM  End Time:  1:00 PM  Facilitators: SANDY Kirkpatrick   Department: [facility]    Number of Participants: 9   Group Focus: coping skills, diagnosis education, and feeling awareness/expression  Treatment Modality: Psychoeducation  Interventions utilized were group exercise and patient education  Purpose: coping skills and feelings    This was a video IOP group on a HIPAA compliant platform. All patients were provided with informed consent.     Date: 3/18/2025, Time: 12p-1p, Number of Patients: 9, User Name: [email or web address],  Number of Staff: 2  Group topic(s): Continued discussion about body image and self-compassion. Patients received psychoeducation about eating disorders (signs and symptoms, diagnoses, etc.) in the context of discussion about body image in the  period. Patients then received psychoeducation about self-compassion- reviewed 3 pillars and patients were led in a guided meditation self-compassion exercise.    IFRAH Rodriguez LISW-S      Name: Ni Loera YOB: 1995   MR: [id number]      Ni shared she would like to practice self-compassion more by utilizing the phrases in the mindfulness exercise and speaking to herself more kindly.

## 2025-03-20 ENCOUNTER — APPOINTMENT (OUTPATIENT)
Dept: BEHAVIORAL HEALTH | Facility: CLINIC | Age: 30
End: 2025-03-20
Payer: COMMERCIAL

## 2025-03-20 DIAGNOSIS — F41.9 ANXIETY: ICD-10-CM

## 2025-03-20 DIAGNOSIS — F33.1 MODERATE EPISODE OF RECURRENT MAJOR DEPRESSIVE DISORDER: ICD-10-CM

## 2025-03-20 PROCEDURE — 90853 GROUP PSYCHOTHERAPY: CPT | Mod: AJ,HE,GT | Performed by: SOCIAL WORKER

## 2025-03-20 NOTE — PROGRESS NOTES
[facility] staff met with pt. to discuss discharge planning and explore next steps for treatment. Pt. Will be discharged from [facility] as of 3/28/2025. Pt. plans to follow up with SANDY Solano (short term, as this provider is leaving ) and also was provided referrals for other therapists. Pt. was offered information and education on discharge and [facility] Aftercare programing. Pt. completed CSSRS and was deemed low risk. Please see CSSRS below.    SAFE-T Protocol with C-SSRS  STEP 1: Identify Risk Factors  In the past month:  1) Wish to be dead -denies  2) Current suicidal thoughts -denies  3) Suicidal thoughts with method -denies  4) Suicidal intent without specific plan -denies  5) Intent with plan -denies     C-SSRS Suicidal Behavior:  Have you ever done anything, started to do anything, or prepared to do anything to end your life?  Lifetime:denies  Past 3 months:denies     STEP 4: Guidelines to determine level of risk and develop interventions to lower risk level  High:  Moderate:  Low:X    SANDY Kirkpatrick  , [facility]

## 2025-03-21 ENCOUNTER — DOCUMENTATION (OUTPATIENT)
Dept: BEHAVIORAL HEALTH | Facility: CLINIC | Age: 30
End: 2025-03-21
Payer: COMMERCIAL

## 2025-03-21 ENCOUNTER — APPOINTMENT (OUTPATIENT)
Dept: BEHAVIORAL HEALTH | Facility: CLINIC | Age: 30
End: 2025-03-21
Payer: COMMERCIAL

## 2025-03-21 NOTE — PROGRESS NOTES
INTENSIVE OUTPATIENT PROGRAM MULTIDISCIPLINARY  TREATMENT PLAN & PROGRESS NOTE     Patient: Ni Loera   :1995 Age:29    Student: No  Treatment Team Date:2025  MRN#[id number]    Psychiatrist: Dr. aDvid Jones  Date of IOP Admission: 25  Ref by: Shara Lincoln           Week  5             Admission Scores: Pt did not complete  STEVENSON 21:  GUMARO:  BDI:   PHQ-9:  MAST: DAST: MDQ:   [place]:     Current Risk Assessment:  Suicidal Risk:      None: X    Ideation:       Plan:      Intent:      SI Plan with plan contracts for safety:     Hx of harming self:        Homicidal Risk:  None:  X Ideation:       Plan:      Intent:      HI Plan with means:                                     Hx of harming others:          Global Improvement    Clinical Global Impressions Rating Scale Of Illness:  5  1-No Illness Present   2-Minimal   3-Mild   4-Moderate   5-Marked   6-Severe  X 7-Very Severe     Diagnoses & ICD-10 Codes F32.1  Primary Diagnosis & Code: Moderate episode of recurrent major depressive disorder     Secondary Diagnosis & Code:   Psychosocial & Environmental Stressors:   Financial  insecurity :   Medication  Family/Home life: X  Work/School:X    Inadequacy of social support      Patient's Treatment Goals:            Date Initiated:            Progress Update:  2025  1.  Attend and actively participate in IOP _3_ days/week for 3 hours per day   2025 Good  X  Fair   Poor   Unclear   2.  Attend weekly medication management sessions and maintain compliance of medications  2025   Good  X  Fair     Poor     Unclear                                                        2.  Identify symptoms of diagnoses and develop coping plans    2025  Good X   Fair     Poor     Unclear                                                 3.  Increase illness education and symptom insight                  2025 Good  X  Fair     Poor     Unclear        Current medications:  See EMR  chart        Progress note:  __New to the IOP program, attending as planned  _X_Compliant, Progressing & Improving - Needs more time in IOP therapy program   __Compliant, Progressing & Improving Planning Discharge   __Compliant, Not Progressing or Improving: Reason  __Non-Compliant, not progressing or improving: Plan  __Other: Patient is progressing well in the program. Patient shared in group wanting to work on personal boundaries.      Insurance & Benefits:  Name [facility]     BENEFITS ARE BASED ON MEDICAL NECESSITY ONLY: Unlimited visits, covers 100% of the contracted rate after deductible has been met.     Co-Pay per day: $0    DEDUCTIBLE        Single:  / Family: / Accumulation:   Single:   / Family:  /  CO- INSURANCE  Single:  / Family:  / Accumulation:  Single:  /  Family: /    OUT OF POCKET  Single:  / Family:  / Accumulation:  Single: /  Family: /     Total IOP Sessions completed & authorized to date:  14    Discharge plans have been discussed with patient & Dr. AHMET Jones on:   Reason for discharge:    ___Successfully completed IOP treatment:   ___Pt. decided to seek treatment elsewhere:   ___Dropped out or no show more than three times:  ___Benefits exhausted:   ___Other:    Discharge date:                   Discharge Prognosis: Excellent      Good     Fair     Poor    Follow up appointment with: Physician:   Follow up appointment with: Therapist:    Follow up Aftercare group?  Yes __   No__     Patient provided with Crisis Hotline phone number for suicide prevention? Yes __ No __    Discharge Scores:   STEVENSON 21:  GUMARO:  BDI:   PHQ-9:  MAST: DAST: MDQ:   [place]:    Treatment plan electronically signed by Treatment Team:   ALEE Jones MD, IFRAH Menard J. Wyse, LISW, K. Fogarty, PsyD

## 2025-03-21 NOTE — GROUP NOTE
Group Topic: Goals   Group Date: 3/20/2025  Start Time: 12:00 PM  End Time:  1:00 PM  Facilitators: SANDY Kirkpatrick   Department: [facility]    Number of Participants: 9   Group Focus: goals  Treatment Modality: Solution-Focused Therapy  Interventions utilized were group exercise  Purpose: coping skills and self-care    This was a video IOP group on a HIPAA compliant platform. All patients were provided with informed consent.     Date: 3/20/2025, Time: 12p-1, Number of Patients: 9, User Name: [email or web address],  Number of Staff: 1  Group topic(s): Weekend goal setting. Patients set SMART goals surrounding enjoyment, achievement and closeness to others. Also discussed times they anticipate being vulnerable during the weekend to increased symptoms and coping strategies/supports.    SANDY Rodriguez-S      Name: Ni Loera YOB: 1995   MR: [id number]      Ni set goals to decorate her home, do laundry and spend time outside. Pt also wants to have a movie night with her daughter. Pt spoke about an upcoming procedure her son is having and identified skills for how she will cope with anxiety surrounding the appointment.

## 2025-03-21 NOTE — GROUP NOTE
Group Topic: Coping Skills   Group Date: 3/20/2025  Start Time: 11:00 AM  End Time: 12:00 PM  Facilitators: Kena Dacosta PsyD   Department: [facility]    Number of Participants: 9   Group Focus: coping skills  Treatment Modality: Dialectical Behavioral Therapy  Interventions utilized were patient education  Purpose: coping skills    This was a video IOP group on a HIPAA compliant platform. All patients were provided with informed consent.    Group topic: Distress tolerance  Group members received education on the ACCEPTS skill and IMPROVE skill from DBT. They identified recent moments of personal/emotional crisis and explored how to use the distress tolerance skills for regulation.   Kena Dacosta Psy.D.  Secondary facilitator: IFRAH Rodriguez      Name: Ni Loera YOB: 1995   MR: [id number]      Ni was present and actively engaged in discussion. She stated that she would like to try using prayer and opposite action. She described herself as an emotional thinker.

## 2025-03-24 ENCOUNTER — APPOINTMENT (OUTPATIENT)
Dept: BEHAVIORAL HEALTH | Facility: CLINIC | Age: 30
End: 2025-03-24
Payer: COMMERCIAL

## 2025-03-24 DIAGNOSIS — F33.1 MODERATE EPISODE OF RECURRENT MAJOR DEPRESSIVE DISORDER: ICD-10-CM

## 2025-03-24 DIAGNOSIS — F41.9 ANXIETY: ICD-10-CM

## 2025-03-24 PROCEDURE — 90853 GROUP PSYCHOTHERAPY: CPT | Mod: AJ,HE,GT | Performed by: SOCIAL WORKER

## 2025-03-24 NOTE — GROUP NOTE
Group Topic: Coping Skills   Group Date: 3/24/2025  Start Time: 10:00 AM  End Time: 11:00 AM  Facilitators: IFRAH Michael   Department: [facility]        Name: Ni Loera YOB: 1995   MR: [id number]      This was a video IOP group on a HIPAA compliant platform. All patients were provided with informed consent.  Date: 03/24/25, Time: 10am-11am group, Number of Patients:8, User Name: IFRAH [email or web address]  Number of Staff: 2    Group topic(s): Check in (jordyn, bud thorn)   Patients shared their highs and lows from the weekend as well as moments of growth or change. Asked patients to identify a weekly intention. Patient shared that she would like to watch a show this week. Ni was present, attentive.     Facilitator: IFRAH Pineda

## 2025-03-25 ENCOUNTER — APPOINTMENT (OUTPATIENT)
Dept: BEHAVIORAL HEALTH | Facility: CLINIC | Age: 30
End: 2025-03-25
Payer: COMMERCIAL

## 2025-03-25 NOTE — GROUP NOTE
Group Topic: Feeling Awareness/Expression   Group Date: 3/24/2025  Start Time: 12:00 PM  End Time:  1:00 PM  Facilitators: SANDY Kirkpatrick   Department: [facility]    Number of Participants: 8   Group Focus: family and feeling awareness/expression  Treatment Modality: Psychoeducation  Interventions utilized were exploration, group exercise, and patient education  Purpose: feelings and insight or knowledge    This was a video IOP group on a HIPAA compliant platform. All patients were provided with informed consent.     Date: 3/24/2025, Time: 12p-1p, Number of Patients: 8, User Name: [email or web address],  Number of Staff: 2  Group topic(s): Mom Power, continued. Patients discussed the impact of past traumas on their parenting. Patients completed an exercise to identify experiences they do and do not want to pass on their children. Discussion ensued. Patients then engaged in a guided visualization exercise.    IFRAH Rodriguez LISW-S      Name: Ni Loera YOB: 1995   MR: [id number]      Ni was present and attentive, shared about her hopes for what she wants to pass on to her children about how to deal with feelings.

## 2025-03-25 NOTE — GROUP NOTE
"Group Topic: Feeling Awareness/Expression   Group Date: 3/24/2025  Start Time: 11:00 AM  End Time: 12:00 PM  Facilitators: SANDY Kirkpatrick   Department: [facility]    Number of Participants: 8   Group Focus: family  Treatment Modality: Psychoeducation  Interventions utilized were group exercise, patient education, and story telling  Purpose: feelings and insight or knowledge    This was a video IOP group on a HIPAA compliant platform. All patients were provided with informed consent.     Date: 3/24/2025, Time: 11a-12p, Number of Patients: 8, User Name: [email or web address],  Number of Staff: 2  Group topic(s): Mom Power. Patients engaged in ice breaker \"common ground\" exercise. Patients received psychoeducation about attachment and early childhood development, from the mom power curriculum. Patients watched videos to practice applying the concepts in relation to their parenting.     IFRAH Rodriguez LISW-S      Name: Ni Loera YOB: 1995   MR: [id number]      Ni was present and attentive but did not share this hour.  "

## 2025-03-27 ENCOUNTER — APPOINTMENT (OUTPATIENT)
Dept: BEHAVIORAL HEALTH | Facility: CLINIC | Age: 30
End: 2025-03-27
Payer: COMMERCIAL

## 2025-03-27 DIAGNOSIS — F41.9 ANXIETY: ICD-10-CM

## 2025-03-27 DIAGNOSIS — F33.1 MODERATE EPISODE OF RECURRENT MAJOR DEPRESSIVE DISORDER: ICD-10-CM

## 2025-03-27 PROCEDURE — 90853 GROUP PSYCHOTHERAPY: CPT | Mod: AJ,HE,GT | Performed by: SOCIAL WORKER

## 2025-03-27 NOTE — GROUP NOTE
Group Topic: Coping Skills   Group Date: 3/27/2025  Start Time: 12:00 PM  End Time:  1:00 PM  Facilitators: IFRAH Michael   Department: [facility]        Name: Ni Loera YOB: 1995   MR: [id number]       This was a video IOP group on a HIPAA compliant platform. All patients were provided with informed consent.  Date: 03/27/25/25, Time: 12pm-1pm group, Number of Patients:10, User Name: IFRAH [email or web address]  Number of Staff: 1    Group topic(s): Weekend Smart Goals  Weekend goal setting. Patients set SMART goals surrounding enjoyment, achievement and closeness to others. Also discussed times they anticipate being vulnerable during the weekend to increased symptoms and coping strategies/supports. Patient's given the opportunity to share words of encouragement to discharging group members. Patient plans to spend time with her children this weekend before returning to work.     Facilitator: IFRAH Pineda

## 2025-03-28 ENCOUNTER — DOCUMENTATION (OUTPATIENT)
Dept: BEHAVIORAL HEALTH | Facility: CLINIC | Age: 30
End: 2025-03-28
Payer: COMMERCIAL

## 2025-03-28 NOTE — PROGRESS NOTES
INTENSIVE OUTPATIENT PROGRAM MULTIDISCIPLINARY  TREATMENT PLAN & PROGRESS NOTE     Patient: Ni Loera   :1995 Age:29    Student: No  Treatment Team Date:2025  MRN#[id number]    Psychiatrist: Dr. David Jones  Date of IOP Admission: 25  Ref by: Shara Lincoln           Week  6             Admission Scores: Pt did not complete  STEVENSON 21:  GUMARO:  BDI:   PHQ-9:  MAST: DAST: MDQ:   [place]:     Current Risk Assessment:  Suicidal Risk:      None: X    Ideation:       Plan:      Intent:      SI Plan with plan contracts for safety:     Hx of harming self:        Homicidal Risk:  None:  X Ideation:       Plan:      Intent:      HI Plan with means:                                     Hx of harming others:          Global Improvement    Clinical Global Impressions Rating Scale Of Illness:  5  1-No Illness Present   2-Minimal   3-Mild   4-Moderate   5-Marked   6-Severe  X 7-Very Severe     Diagnoses & ICD-10 Codes F32.1  Primary Diagnosis & Code: Moderate episode of recurrent major depressive disorder     Secondary Diagnosis & Code:   Psychosocial & Environmental Stressors:   Financial  insecurity :   Medication  Family/Home life: X  Work/School:X    Inadequacy of social support      Patient's Treatment Goals:            Date Initiated:            Progress Update:  2025  1.  Attend and actively participate in IOP _3_ days/week for 3 hours per day   2025 Good  X  Fair   Poor   Unclear   2.  Attend weekly medication management sessions and maintain compliance of medications  2025   Good  X  Fair     Poor     Unclear                                                        2.  Identify symptoms of diagnoses and develop coping plans    2025  Good X   Fair     Poor     Unclear                                                 3.  Increase illness education and symptom insight                  2025 Good  X  Fair     Poor     Unclear        Current medications:  See EMR  chart        Progress note:  __New to the IOP program, attending as planned  __Compliant, Progressing & Improving - Needs more time in IOP therapy program   __Compliant, Progressing & Improving Planning Discharge   __Compliant, Not Progressing or Improving: Reason  __Non-Compliant, not progressing or improving: Plan  __Other: Patient discharging from IOP this week. Patient given list of resources for individual therapy.      Insurance & Benefits:  [facility]     BENEFITS ARE BASED ON MEDICAL NECESSITY ONLY: Unlimited visits, covers 100% of the contracted rate after deductible has been met.     Co-Pay per day: $0    DEDUCTIBLE        Single:  / Family: / Accumulation:   Single:   / Family:  /  CO- INSURANCE  Single:  / Family:  / Accumulation:  Single:  /  Family: /    OUT OF POCKET  Single:  / Family:  / Accumulation:  Single: /  Family: /     Total IOP Sessions completed & authorized to date:  16    Discharge plans have been discussed with patient & Dr. AHMET Jones on:   Reason for discharge:    _X__Successfully completed IOP treatment:   ___Pt. decided to seek treatment elsewhere:   ___Dropped out or no show more than three times:  ___Benefits exhausted:   ___Other:    Discharge date:                   Discharge Prognosis: Excellent      Good X     Fair     Poor    Follow up appointment with: Physician: Not taking medication   Follow up appointment with: Therapist:  Patient given follow up resources   Follow up Aftercare group?  Yes __   No__     Patient provided with Crisis Hotline phone number for suicide prevention? Yes X__ No __    Discharge Scores:   STEVENSON 21:  GUMARO:  BDI:   PHQ-9:  MAST: DAST: MDQ:   [place]:    Treatment plan electronically signed by Treatment Team:   ALEE Jones MD, IFRAH Menard J. Wyse, LISW, K. Fogarty, PsyD

## 2025-03-31 NOTE — GROUP NOTE
Group Topic: Self Esteem   Group Date: 3/27/2025  Start Time: 11:00 AM  End Time: 12:00 PM  Facilitators: Kena Dacosta PsyD   Department: [facility]    Number of Participants: 10   Group Focus: other self-worth  Treatment Modality: Psychoeducation  Interventions utilized were exploration and patient education  Purpose: self-worth    This was a video IOP group on a HIPAA compliant platform. All patients were provided with informed consent.    Group topic: Self-worth   Group members received education on strategies for increasing self-worth. They discussed ways to increase self-understanding, boost self-acceptance and practice self-love. They answered questions related to identifying positive traits.   Kena Dacosta Psy.D.  Secondary facilitator: IFRAH Pineda      Name: Ni Loera YOB: 1995   MR: [id number]      Ni was present and actively engaged in discussion. She shared that she is working on going to the gym and returning to old routine. She noted that getting back to the gym has been an achievement for her.

## 2025-03-31 NOTE — GROUP NOTE
Group Topic: Self Esteem   Group Date: 3/27/2025  Start Time: 10:00 AM  End Time: 11:00 AM  Facilitators: Kena Dacosta PsyD   Department: [facility]    Number of Participants: 9   Group Focus: other Self-worth  Treatment Modality: Psychoeducation  Interventions utilized were exploration and patient education  Purpose: self-worth    This was a video IOP group on a HIPAA compliant platform. All patients were provided with informed consent.    Group topic: Self-worth   Group members received education on definitions of self-worth and self-esteem. They explored what has influenced their self-worth and identified externals they have based their worth on. They defined the basics of human worth.   Kena Dacosta Psy.D.  Secondary facilitator: IFRAH Pineda      Name: Ni Loera YOB: 1995   MR: [id number]      Ni was present and actively listened to the discussion. She was observed nodding along to the discussion but chose not to share.

## 2025-04-21 ENCOUNTER — APPOINTMENT (OUTPATIENT)
Dept: DERMATOLOGY | Facility: CLINIC | Age: 30
End: 2025-04-21
Payer: COMMERCIAL

## 2025-04-21 DIAGNOSIS — L30.9 HAND DERMATITIS: Primary | ICD-10-CM

## 2025-04-21 DIAGNOSIS — L65.0 TELOGEN EFFLUVIUM: ICD-10-CM

## 2025-04-21 PROCEDURE — 99204 OFFICE O/P NEW MOD 45 MIN: CPT | Performed by: STUDENT IN AN ORGANIZED HEALTH CARE EDUCATION/TRAINING PROGRAM

## 2025-04-21 RX ORDER — CLOBETASOL PROPIONATE 0.5 MG/G
CREAM TOPICAL 2 TIMES DAILY
Qty: 60 G | Refills: 11 | Status: SHIPPED | OUTPATIENT
Start: 2025-04-21

## 2025-04-21 NOTE — PROGRESS NOTES
Subjective     Ni Loera is a 29 y.o. female who presents for the following: Alopecia (Significant hair loss after giving birth 9 months ago. She was breastfeeding up until about 1 month ago. Does have regrowth, but still shedding. No prior treatments. Recently changed brands of shampoo and conditioner to [employer]isture restore brand. TSH WNL in Nov, Vit D was 22, took bolus supplements. ).     Review of Systems:  No other skin or systemic complaints other than what is documented elsewhere in the note.    The following portions of the chart were reviewed this encounter and updated as appropriate:          Skin Cancer History  Biopsy Log Book  No skin cancers from Specimen Tracking.    Additional History      Specialty Problems    None       Objective   Well appearing patient in no apparent distress; mood and affect are within normal limits.    A focused skin examination was performed. All findings within normal limits unless otherwise noted below.    Assessment/Plan   Skin Exam  1. HAND DERMATITIS  Left Hand - Anterior, Right Hand - Anterior  Bilateral lateral fingers with scaly pink plaques      Advised gentle skin care  Moisturize multiple times daily, particularly after wet work  For flares start clobetasol. Patient to apply to affected areas 2x daily x 2 weeks then 1 week off, repeat as needed. Side effects of topical steroids were reviewed including risk of skin atrophy.      Related Medications  clobetasol (Temovate) 0.05 % cream  Apply topically 2 times a day. To rash on hands. 14 days on, 7 days off. Repeat as needed for rash.  2. TELOGEN EFFLUVIUM  Scalp  Diffuse thinning of scalp with several areas of ~2 cm hair growth  Gave birth 9 months ago  Significant hair loss following  Hair pull test is negative today  Discussed condition and advised her hair will recover over next several motnhs

## 2025-04-21 NOTE — Clinical Note
Gave birth 9 months ago  Significant hair loss following  Hair pull test is negative today  Discussed condition and advised her hair will recover over next several motnhs

## 2025-04-21 NOTE — Clinical Note
Advised gentle skin care  Moisturize multiple times daily, particularly after wet work  For flares start clobetasol. Patient to apply to affected areas 2x daily x 2 weeks then 1 week off, repeat as needed. Side effects of topical steroids were reviewed including risk of skin atrophy.

## 2025-05-07 ENCOUNTER — APPOINTMENT (OUTPATIENT)
Dept: OBSTETRICS AND GYNECOLOGY | Facility: CLINIC | Age: 30
End: 2025-05-07
Payer: COMMERCIAL

## 2025-05-08 ENCOUNTER — OFFICE VISIT (OUTPATIENT)
Dept: OBSTETRICS AND GYNECOLOGY | Facility: CLINIC | Age: 30
End: 2025-05-08
Payer: COMMERCIAL

## 2025-05-08 VITALS — BODY MASS INDEX: 35.77 KG/M2 | DIASTOLIC BLOOD PRESSURE: 74 MMHG | SYSTOLIC BLOOD PRESSURE: 113 MMHG | WEIGHT: 225 LBS

## 2025-05-08 DIAGNOSIS — Z97.5 IUD (INTRAUTERINE DEVICE) IN PLACE: Primary | ICD-10-CM

## 2025-05-08 DIAGNOSIS — Z11.3 SCREENING EXAMINATION FOR STI: ICD-10-CM

## 2025-05-08 LAB — PREGNANCY TEST URINE, POC: NEGATIVE

## 2025-05-08 PROCEDURE — 81025 URINE PREGNANCY TEST: CPT

## 2025-05-08 PROCEDURE — 1036F TOBACCO NON-USER: CPT

## 2025-05-08 PROCEDURE — 99214 OFFICE O/P EST MOD 30 MIN: CPT

## 2025-05-08 RX ORDER — LEVONORGESTREL 52 MG/1
1 INTRAUTERINE DEVICE INTRAUTERINE ONCE
COMMUNITY

## 2025-05-08 NOTE — PROGRESS NOTES
"Subjective   Patient ID: Ni Loera is a 29 y.o. female who presents for IUD Check.    HPI  Had Mirena IUD inserted 2/27/2025.  Saw Dr. Martinez for string check 3/6 -- Partner felt string during intercourse per patient, though Dr. Martinez did not visualize strings on exam, but did feel them at the cervical os.   Returns today for string check to attempt visualization.   Feeling \"not totally herself,\" feels that her libido has decreased since having IUD inserted and acne has worsened.    Is feeling nervous since she conceived while taking birth control pills last pregnancy; feels that she is causing herself to have pregnancy-like symptoms, requests pregnancy test today.   Does NOT want to remove IUD.  Would like STI screening just to be safe; has not changed partners.     Review of Systems   All other systems reviewed and are negative.      Objective   Physical Exam  Constitutional:       Appearance: Normal appearance.   HENT:      Head: Normocephalic.   Pulmonary:      Effort: Pulmonary effort is normal.   Genitourinary:     General: Normal vulva.      Vagina: Normal.      Cervix: Normal.      Comments: IUD strings visualized.   Skin:     General: Skin is warm and dry.   Neurological:      Mental Status: She is alert and oriented to person, place, and time.   Psychiatric:         Mood and Affect: Mood normal.         Assessment/Plan   Diagnoses and all orders for this visit:  IUD (intrauterine device) in place       - IUD strings visualized on exam today.   Screening examination for STI  -     POCT pregnancy, urine manually resulted -- NEGATIVE.  -     C. trachomatis / N. gonorrhoeae, Amplified, Urogenital  -     Trichomonas vaginalis, Amplified  -     Hepatitis B surface Ag; Future  -     Hepatitis C Antibody; Future  -     HIV 1/2 Antigen/Antibody Screen with Reflex to Confirmation; Future  -     Syphilis Screen with Reflex; Future    Will await results.    F/U as needed.     Jacquelin Davis, DEWAYNE-JHON " 05/08/25 8:40 AM

## 2025-05-09 LAB
C TRACH RRNA SPEC QL NAA+PROBE: NOT DETECTED
N GONORRHOEA RRNA SPEC QL NAA+PROBE: NOT DETECTED
QUEST GC CT AMPLIFIED (ALWAYS MESSAGE): NORMAL
T VAGINALIS RRNA SPEC QL NAA+PROBE: NOT DETECTED

## 2025-05-10 LAB
HBV SURFACE AG SERPL QL IA: NORMAL
HCV AB SERPL QL IA: NORMAL
HIV 1+2 AB+HIV1 P24 AG SERPL QL IA: NORMAL
T PALLIDUM AB SER QL IA: NEGATIVE

## 2025-06-02 ENCOUNTER — APPOINTMENT (OUTPATIENT)
Dept: PRIMARY CARE | Facility: CLINIC | Age: 30
End: 2025-06-02
Payer: COMMERCIAL

## 2025-06-09 ENCOUNTER — APPOINTMENT (OUTPATIENT)
Dept: PRIMARY CARE | Facility: CLINIC | Age: 30
End: 2025-06-09
Payer: COMMERCIAL

## 2025-06-16 ENCOUNTER — APPOINTMENT (OUTPATIENT)
Dept: PRIMARY CARE | Facility: CLINIC | Age: 30
End: 2025-06-16
Payer: COMMERCIAL

## 2025-07-08 ENCOUNTER — APPOINTMENT (OUTPATIENT)
Dept: DERMATOLOGY | Facility: CLINIC | Age: 30
End: 2025-07-08
Payer: COMMERCIAL

## 2025-08-11 ENCOUNTER — APPOINTMENT (OUTPATIENT)
Dept: PRIMARY CARE | Facility: CLINIC | Age: 30
End: 2025-08-11
Payer: COMMERCIAL

## 2025-08-13 ENCOUNTER — APPOINTMENT (OUTPATIENT)
Dept: PRIMARY CARE | Facility: CLINIC | Age: 30
End: 2025-08-13
Payer: COMMERCIAL

## 2025-08-15 ENCOUNTER — OFFICE VISIT (OUTPATIENT)
Dept: OBSTETRICS AND GYNECOLOGY | Facility: CLINIC | Age: 30
End: 2025-08-15
Payer: COMMERCIAL

## 2025-08-15 VITALS — SYSTOLIC BLOOD PRESSURE: 112 MMHG | WEIGHT: 221.4 LBS | BODY MASS INDEX: 35.2 KG/M2 | DIASTOLIC BLOOD PRESSURE: 76 MMHG

## 2025-08-15 DIAGNOSIS — B37.31 YEAST VAGINITIS: Primary | ICD-10-CM

## 2025-08-15 DIAGNOSIS — N89.8 VAGINAL DISCHARGE: ICD-10-CM

## 2025-08-15 DIAGNOSIS — L29.2 VULVAR ITCHING: ICD-10-CM

## 2025-08-15 PROCEDURE — 99204 OFFICE O/P NEW MOD 45 MIN: CPT | Performed by: OBSTETRICS & GYNECOLOGY

## 2025-08-15 PROCEDURE — 1036F TOBACCO NON-USER: CPT | Performed by: OBSTETRICS & GYNECOLOGY

## 2025-08-15 RX ORDER — FLUCONAZOLE 150 MG/1
TABLET ORAL
Qty: 2 TABLET | Refills: 0 | Status: SHIPPED | OUTPATIENT
Start: 2025-08-15

## 2025-08-15 ASSESSMENT — PATIENT HEALTH QUESTIONNAIRE - PHQ9
2. FEELING DOWN, DEPRESSED OR HOPELESS: NOT AT ALL
1. LITTLE INTEREST OR PLEASURE IN DOING THINGS: NOT AT ALL
SUM OF ALL RESPONSES TO PHQ9 QUESTIONS 1 & 2: 0

## 2025-08-16 LAB
C TRACH RRNA SPEC QL NAA+PROBE: NOT DETECTED
HBV SURFACE AG SERPL QL IA: NORMAL
HCV AB SERPL QL IA: NORMAL
HIV 1+2 AB+HIV1 P24 AG SERPL QL IA: NORMAL
HIV 1+2 AB+HIV1 P24 AG SERPL QL IA: NORMAL
N GONORRHOEA RRNA SPEC QL NAA+PROBE: NOT DETECTED
QUEST GC CT AMPLIFIED (ALWAYS MESSAGE): NORMAL
T PALLIDUM AB SER QL IA: NORMAL
T VAGINALIS RRNA SPEC QL NAA+PROBE: NOT DETECTED

## 2025-08-20 LAB
HBV SURFACE AG SERPL QL IA: NORMAL
HCV AB SERPL QL IA: NORMAL
HIV 1+2 AB+HIV1 P24 AG SERPL QL IA: NORMAL
HIV 1+2 AB+HIV1 P24 AG SERPL QL IA: NORMAL
T PALLIDUM AB SER QL IA: NEGATIVE

## 2025-10-14 ENCOUNTER — APPOINTMENT (OUTPATIENT)
Dept: PRIMARY CARE | Facility: CLINIC | Age: 30
End: 2025-10-14
Payer: COMMERCIAL